# Patient Record
Sex: FEMALE | Race: WHITE | NOT HISPANIC OR LATINO | Employment: OTHER | URBAN - METROPOLITAN AREA
[De-identification: names, ages, dates, MRNs, and addresses within clinical notes are randomized per-mention and may not be internally consistent; named-entity substitution may affect disease eponyms.]

---

## 2023-08-18 ENCOUNTER — HOSPITAL ENCOUNTER (OUTPATIENT)
Dept: DATA CONVERSION | Facility: HOSPITAL | Age: 78
Discharge: HOME | End: 2023-08-18
Payer: COMMERCIAL

## 2023-08-18 DIAGNOSIS — Z00.00 ENCOUNTER FOR GENERAL ADULT MEDICAL EXAMINATION WITHOUT ABNORMAL FINDINGS: ICD-10-CM

## 2023-08-18 LAB
ANION GAP SERPL CALCULATED.3IONS-SCNC: 10 MMOL/L (ref 0–19)
BUN SERPL-MCNC: 13 MG/DL (ref 8–25)
BUN/CREAT SERPL: 11.8 RATIO (ref 8–21)
CALCIUM SERPL-MCNC: 9.5 MG/DL (ref 8.5–10.4)
CHLORIDE SERPL-SCNC: 109 MMOL/L (ref 97–107)
CO2 SERPL-SCNC: 21 MMOL/L (ref 24–31)
CREAT SERPL-MCNC: 1.1 MG/DL (ref 0.4–1.6)
DEPRECATED RDW RBC AUTO: 45.9 FL (ref 37–54)
ERYTHROCYTE [DISTWIDTH] IN BLOOD BY AUTOMATED COUNT: 12.9 % (ref 11.7–15)
GFR SERPL CREATININE-BSD FRML MDRD: 51 ML/MIN/1.73 M2
GLUCOSE SERPL-MCNC: 145 MG/DL (ref 65–99)
HCT VFR BLD AUTO: 42.6 % (ref 36–44)
HGB BLD-MCNC: 14.2 GM/DL (ref 12–15)
MCH RBC QN AUTO: 32.6 PG (ref 26–34)
MCHC RBC AUTO-ENTMCNC: 33.3 % (ref 31–37)
MCV RBC AUTO: 97.9 FL (ref 80–100)
NRBC BLD-RTO: 0 /100 WBC
PLATELET # BLD AUTO: 248 K/UL (ref 150–450)
PMV BLD AUTO: 9.4 CU (ref 7–12.6)
POTASSIUM SERPL-SCNC: 4.1 MMOL/L (ref 3.4–5.1)
RBC # BLD AUTO: 4.35 M/UL (ref 4–4.9)
SODIUM SERPL-SCNC: 140 MMOL/L (ref 133–145)
WBC # BLD AUTO: 8.9 K/UL (ref 4.5–11)

## 2023-10-02 ENCOUNTER — LAB REQUISITION (OUTPATIENT)
Dept: LAB | Facility: HOSPITAL | Age: 78
End: 2023-10-02
Payer: MEDICARE

## 2023-10-02 LAB — TSH SERPL DL<=0.05 MIU/L-ACNC: 4.52 MIU/L (ref 0.27–4.2)

## 2023-10-02 PROCEDURE — 84443 ASSAY THYROID STIM HORMONE: CPT | Mod: OUT,TRILAB | Performed by: INTERNAL MEDICINE

## 2023-10-02 PROCEDURE — 80061 LIPID PANEL: CPT | Mod: OUT,CMCLAB,TRILAB | Performed by: INTERNAL MEDICINE

## 2023-10-02 PROCEDURE — 80061 LIPID PANEL: CPT | Mod: OUT,TRILAB | Performed by: INTERNAL MEDICINE

## 2023-10-04 LAB
CHOLEST SERPL-MCNC: 138 MG/DL (ref 0–199)
CHOLESTEROL/HDL RATIO: 2.6
HDLC SERPL-MCNC: 52.8 MG/DL
LDLC SERPL CALC-MCNC: 66 MG/DL (ref 140–190)
NON HDL CHOLESTEROL: 85 MG/DL (ref 0–149)
TRIGL SERPL-MCNC: 96 MG/DL (ref 0–149)
VLDL: 19 MG/DL (ref 0–40)

## 2023-10-05 ENCOUNTER — NURSING HOME VISIT (OUTPATIENT)
Dept: POST ACUTE CARE | Facility: EXTERNAL LOCATION | Age: 78
End: 2023-10-05
Payer: MEDICARE

## 2023-10-05 VITALS
TEMPERATURE: 97 F | OXYGEN SATURATION: 94 % | BODY MASS INDEX: 34.53 KG/M2 | SYSTOLIC BLOOD PRESSURE: 111 MMHG | RESPIRATION RATE: 18 BRPM | HEART RATE: 70 BPM | DIASTOLIC BLOOD PRESSURE: 61 MMHG | WEIGHT: 207.5 LBS

## 2023-10-05 DIAGNOSIS — I50.9 CHRONIC CONGESTIVE HEART FAILURE, UNSPECIFIED HEART FAILURE TYPE (MULTI): ICD-10-CM

## 2023-10-05 DIAGNOSIS — J18.9 PNEUMONIA OF RIGHT LOWER LOBE DUE TO INFECTIOUS ORGANISM: Primary | ICD-10-CM

## 2023-10-05 DIAGNOSIS — E03.9 ACQUIRED HYPOTHYROIDISM: ICD-10-CM

## 2023-10-05 DIAGNOSIS — F31.32 BIPOLAR AFFECTIVE DISORDER, CURRENTLY DEPRESSED, MODERATE (MULTI): ICD-10-CM

## 2023-10-05 PROCEDURE — 99308 SBSQ NF CARE LOW MDM 20: CPT | Performed by: PHYSICIAN ASSISTANT

## 2023-10-05 ASSESSMENT — ENCOUNTER SYMPTOMS
FLANK PAIN: 0
WHEEZING: 0
HEMATURIA: 0
VOMITING: 0
TREMORS: 0
SHORTNESS OF BREATH: 0
COUGH: 1
NAUSEA: 0
DIARRHEA: 0
HEADACHES: 0
FEVER: 0
DYSURIA: 0
CHILLS: 0
APPETITE CHANGE: 0
CONFUSION: 0
FREQUENCY: 0
DIZZINESS: 0
ABDOMINAL PAIN: 0
WEAKNESS: 0
CONSTIPATION: 0
NERVOUS/ANXIOUS: 0

## 2023-10-05 NOTE — ASSESSMENT & PLAN NOTE
Chest x-ray showed right Lower lobe infiltrate.   Treat with course of levaquin 500mg daily x 7 days.

## 2023-10-05 NOTE — PROGRESS NOTES
No chief complaint on file.      Subjective   [id number] : Mariella Cuello is a 78 y.o. female LTC resident at [facility].   HPI  Asked to see pt this morning by nursing due to reports of cough and chest congestion.  Pt seen while resting in bed.  Pt is a poor historian.   She states that she feels well and offers no new complaints.   She does not appear to be short of breath.  She has no lower leg edema noted.  She does take lasix daily.  No fevers, chills or hypoxia reported.   Pt had routine labs this week which have been reviewed.      Review of Systems   Constitutional:  Negative for appetite change, chills and fever.   Respiratory:  Positive for cough. Negative for shortness of breath and wheezing.    Cardiovascular:  Negative for chest pain and leg swelling.   Gastrointestinal:  Negative for abdominal pain, constipation, diarrhea, nausea and vomiting.   Genitourinary:  Negative for dysuria, flank pain, frequency and hematuria.   Neurological:  Negative for dizziness, tremors, weakness and headaches.   Psychiatric/Behavioral:  Negative for confusion. The patient is not nervous/anxious.    All other systems reviewed and are negative.      Objective   /61   Pulse 70   Temp 36.1 °C (97 °F)   Resp 18   Wt 94.1 kg (207 lb 8 oz)   SpO2 94%   BMI 34.53 kg/m²    Physical Exam  Constitutional:       General: She is not in acute distress.  Eyes:      Conjunctiva/sclera: Conjunctivae normal.      Pupils: Pupils are equal, round, and reactive to light.   Cardiovascular:      Rate and Rhythm: Normal rate and regular rhythm.      Heart sounds: No murmur heard.  Pulmonary:      Effort: Pulmonary effort is normal.      Breath sounds: No wheezing, rhonchi or rales.      Comments: Crackles in the right base.  Abdominal:      General: Abdomen is flat. Bowel sounds are normal. There is no distension.      Palpations: Abdomen is soft. There is no mass.      Tenderness: There is no abdominal tenderness.  "  Musculoskeletal:         General: No swelling. Normal range of motion.   Skin:     General: Skin is warm and dry.      Findings: No rash.   Neurological:      General: No focal deficit present.      Mental Status: She is alert and oriented to person, place, and time. Mental status is at baseline.       No lab exists for component: \"CBC BMP\"  Assessment/Plan   Problem List Items Addressed This Visit             ICD-10-CM    Pneumonia of right lower lobe due to infectious organism - Primary J18.9     Chest x-ray showed right Lower lobe infiltrate.   Treat with course of levaquin 500mg daily x 7 days.          Chronic congestive heart failure (CMS/HCC) I50.9     Continue lasix.  Stable.          Acquired hypothyroidism E03.9     Elevated TSH on routine labs.  Synthroid increased to 125mcg daily.  Recheck labs in 3 mo.         Bipolar affective disorder, currently depressed, moderate (CMS/HCC) F31.32     Continue current meds.  Pt follows with psych                "

## 2023-10-05 NOTE — LETTER
Patient: Mariella Cuello  : 1945    Encounter Date: 10/05/2023    No chief complaint on file.      Subjective  [id number] : Mariella Cuello is a 78 y.o. female LTC resident at [facility].   HPI  Asked to see pt this morning by nursing due to reports of cough and chest congestion.  Pt seen while resting in bed.  Pt is a poor historian.   She states that she feels well and offers no new complaints.   She does not appear to be short of breath.  She has no lower leg edema noted.  She does take lasix daily.  No fevers, chills or hypoxia reported.   Pt had routine labs this week which have been reviewed.      Review of Systems   Constitutional:  Negative for appetite change, chills and fever.   Respiratory:  Positive for cough. Negative for shortness of breath and wheezing.    Cardiovascular:  Negative for chest pain and leg swelling.   Gastrointestinal:  Negative for abdominal pain, constipation, diarrhea, nausea and vomiting.   Genitourinary:  Negative for dysuria, flank pain, frequency and hematuria.   Neurological:  Negative for dizziness, tremors, weakness and headaches.   Psychiatric/Behavioral:  Negative for confusion. The patient is not nervous/anxious.    All other systems reviewed and are negative.      Objective  /61   Pulse 70   Temp 36.1 °C (97 °F)   Resp 18   Wt 94.1 kg (207 lb 8 oz)   SpO2 94%   BMI 34.53 kg/m²    Physical Exam  Constitutional:       General: She is not in acute distress.  Eyes:      Conjunctiva/sclera: Conjunctivae normal.      Pupils: Pupils are equal, round, and reactive to light.   Cardiovascular:      Rate and Rhythm: Normal rate and regular rhythm.      Heart sounds: No murmur heard.  Pulmonary:      Effort: Pulmonary effort is normal.      Breath sounds: No wheezing, rhonchi or rales.      Comments: Crackles in the right base.  Abdominal:      General: Abdomen is flat. Bowel sounds are normal. There is no distension.      Palpations: Abdomen is soft. There is no  "mass.      Tenderness: There is no abdominal tenderness.   Musculoskeletal:         General: No swelling. Normal range of motion.   Skin:     General: Skin is warm and dry.      Findings: No rash.   Neurological:      General: No focal deficit present.      Mental Status: She is alert and oriented to person, place, and time. Mental status is at baseline.       No lab exists for component: \"CBC BMP\"  Assessment/Plan  Problem List Items Addressed This Visit             ICD-10-CM    Pneumonia of right lower lobe due to infectious organism - Primary J18.9     Chest x-ray showed right Lower lobe infiltrate.   Treat with course of levaquin 500mg daily x 7 days.          Chronic congestive heart failure (CMS/HCC) I50.9     Continue lasix.  Stable.          Acquired hypothyroidism E03.9     Elevated TSH on routine labs.  Synthroid increased to 125mcg daily.  Recheck labs in 3 mo.         Bipolar affective disorder, currently depressed, moderate (CMS/HCC) F31.32     Continue current meds.  Pt follows with psych                    Electronically Signed By: Ana Paula Paul PA-C   10/5/23  2:19 PM  "

## 2023-10-19 ENCOUNTER — NURSING HOME VISIT (OUTPATIENT)
Dept: PRIMARY CARE | Facility: CLINIC | Age: 78
End: 2023-10-19
Payer: MEDICARE

## 2023-10-19 DIAGNOSIS — E03.9 ACQUIRED HYPOTHYROIDISM: ICD-10-CM

## 2023-10-19 DIAGNOSIS — N18.31 STAGE 3A CHRONIC KIDNEY DISEASE (MULTI): ICD-10-CM

## 2023-10-19 DIAGNOSIS — I50.32 CHRONIC DIASTOLIC CONGESTIVE HEART FAILURE (MULTI): Primary | ICD-10-CM

## 2023-10-19 DIAGNOSIS — F31.32 BIPOLAR AFFECTIVE DISORDER, CURRENTLY DEPRESSED, MODERATE (MULTI): ICD-10-CM

## 2023-10-19 PROCEDURE — 99308 SBSQ NF CARE LOW MDM 20: CPT | Performed by: INTERNAL MEDICINE

## 2023-10-19 NOTE — PROGRESS NOTES
PROGRESS NOTE      Mariella Cuello is a 78 y.o. female seen in follow up at [facility].    ASSESSMENT / PLAN:  Diagnoses and all orders for this visit:  Chronic diastolic congestive heart failure (CMS/HCC)  Acquired hypothyroidism  Bipolar affective disorder, currently depressed, moderate (CMS/HCC)  Stage 3a chronic kidney disease (CMS/HCC)    Patient Active Problem List   Diagnosis    Pneumonia of right lower lobe due to infectious organism    Chronic congestive heart failure (CMS/HCC)    Acquired hypothyroidism    Bipolar affective disorder, currently depressed, moderate (CMS/HCC)     Continue the currently prescribed medications as outlined in [facility].  TSH 4.52 hard to tell if clinically euthryroid - would recheck in 1-2 month and if climbing make med adjustment.    Subjective   This is a 78 y.o. female who resides at at long term care facility, seen today for routine follow up.  The nursing facility documentation is reviewed including orders.  There are currently no concerns over adverse reactions to the prescribed medications.  I reviewed any pertinent laboratory studies.  I discussed the patient's condition with the staff, including current functional status.        Review of Systems   Objective   Vital signs reviewed in [facility].  Physical Exam  Vitals reviewed.   Constitutional:       Appearance: Normal appearance.   Cardiovascular:      Rate and Rhythm: Normal rate and regular rhythm.      Heart sounds: No murmur heard.  Pulmonary:      Breath sounds: Normal breath sounds. No wheezing, rhonchi or rales.   Musculoskeletal:      Right lower leg: No edema.      Left lower leg: No edema.         Medical History as seen below has been reviewed and updated in the chart.  Past Medical History:   Diagnosis Date    Atherosclerotic heart disease of native coronary artery without angina pectoris 05/12/2021    Atherosclerosis of coronary artery without angina pectoris, unspecified vessel or lesion  type, unspecified whether native or transplanted heart    Bipolar disorder, currently in remission, most recent episode unspecified (CMS/[facility]) 05/12/2021    History of depressed bipolar disorder    Chronic systolic (congestive) heart failure (CMS/[facility]) 05/12/2021    Chronic systolic heart failure, ACC/AHA stage C    Major depressive disorder, recurrent, unspecified (CMS/[facility]) 05/12/2021    Major depression, recurrent    Personal history of diseases of the blood and blood-forming organs and certain disorders involving the immune mechanism 05/12/2021    History of anemia    Personal history of other diseases of the digestive system 05/12/2021    History of constipation    Personal history of other endocrine, nutritional and metabolic disease 05/12/2021    History of hyperlipidemia    Personal history of other endocrine, nutritional and metabolic disease 05/12/2021    History of hypothyroidism    Personal history of other specified conditions 05/12/2021    History of insomnia     Past Surgical History:   Procedure Laterality Date    CATARACT EXTRACTION  02/29/2016    Cataract Surgery    MR HEAD ANGIO WO IV CONTRAST  1/22/2014    MR HEAD ANGIO WO IV CONTRAST [facility]    TONSILLECTOMY  02/29/2016    Tonsillectomy    TOTAL ABDOMINAL HYSTERECTOMY  02/29/2016    Total Abdominal Hysterectomy     No family history on file.  Not on File  No current outpatient medications on file prior to visit.     No current facility-administered medications on file prior to visit.     Immunization History   Administered Date(s) Administered    Pfizer Purple Cap SARS-CoV-2 10/29/2021     Alirio Augustine MD

## 2023-11-28 ENCOUNTER — TELEPHONE (OUTPATIENT)
Dept: PRIMARY CARE | Facility: CLINIC | Age: 78
End: 2023-11-28
Payer: COMMERCIAL

## 2023-11-28 DIAGNOSIS — G89.29 OTHER CHRONIC PAIN: Primary | ICD-10-CM

## 2023-11-28 RX ORDER — TRAMADOL HYDROCHLORIDE 50 MG/1
TABLET ORAL
COMMUNITY
Start: 2023-03-22 | End: 2023-11-28 | Stop reason: SDUPTHER

## 2023-11-28 NOTE — TELEPHONE ENCOUNTER
Refill - [facility]     Tramadol 50 mg  1 tablet by mouth every 12 hours as needed for pain    30 days  no refills

## 2023-11-29 RX ORDER — TRAMADOL HYDROCHLORIDE 50 MG/1
50 TABLET ORAL 2 TIMES DAILY
Qty: 60 TABLET | Refills: 0 | Status: SHIPPED | OUTPATIENT
Start: 2023-11-29 | End: 2023-12-14 | Stop reason: SDUPTHER

## 2023-12-14 ENCOUNTER — TELEPHONE (OUTPATIENT)
Dept: PRIMARY CARE | Facility: CLINIC | Age: 78
End: 2023-12-14
Payer: COMMERCIAL

## 2023-12-14 DIAGNOSIS — G89.29 OTHER CHRONIC PAIN: ICD-10-CM

## 2023-12-14 RX ORDER — TRAMADOL HYDROCHLORIDE 50 MG/1
50 TABLET ORAL 2 TIMES DAILY PRN
Qty: 60 TABLET | Refills: 2 | Status: SHIPPED | OUTPATIENT
Start: 2023-12-14

## 2023-12-14 RX ORDER — TRAMADOL HYDROCHLORIDE 50 MG/1
50 TABLET ORAL EVERY 8 HOURS PRN
Qty: 10 TABLET | Refills: 0 | OUTPATIENT
Start: 2023-12-14 | End: 2023-12-19

## 2023-12-14 NOTE — TELEPHONE ENCOUNTER
[facility] - 2 requests     [facility]     Tramadol Tab 50 mg  1 tablet by mouth every eight hours  as needed for moderate pain (4-6)    Tramadol 50 mg  1 tablet by mouth two times a day

## 2023-12-18 ENCOUNTER — APPOINTMENT (OUTPATIENT)
Dept: RADIOLOGY | Facility: HOSPITAL | Age: 78
DRG: 689 | End: 2023-12-18
Payer: MEDICARE

## 2023-12-18 ENCOUNTER — HOSPITAL ENCOUNTER (OUTPATIENT)
Dept: CARDIOLOGY | Facility: HOSPITAL | Age: 78
Discharge: HOME | End: 2023-12-18
Payer: MEDICARE

## 2023-12-18 ENCOUNTER — HOSPITAL ENCOUNTER (INPATIENT)
Facility: HOSPITAL | Age: 78
LOS: 2 days | Discharge: NURSING, REHAB OR LONG-TERM CARE | DRG: 689 | End: 2023-12-21
Attending: STUDENT IN AN ORGANIZED HEALTH CARE EDUCATION/TRAINING PROGRAM | Admitting: EMERGENCY MEDICINE
Payer: MEDICARE

## 2023-12-18 DIAGNOSIS — N30.00 ACUTE CYSTITIS WITHOUT HEMATURIA: ICD-10-CM

## 2023-12-18 DIAGNOSIS — R41.82 ALTERED MENTAL STATUS, UNSPECIFIED ALTERED MENTAL STATUS TYPE: Primary | ICD-10-CM

## 2023-12-18 DIAGNOSIS — J40 BRONCHITIS: ICD-10-CM

## 2023-12-18 LAB
ALBUMIN SERPL-MCNC: 3.5 G/DL (ref 3.5–5)
ALP BLD-CCNC: 99 U/L (ref 35–125)
ALT SERPL-CCNC: 18 U/L (ref 5–40)
ANION GAP SERPL CALC-SCNC: 10 MMOL/L
APPEARANCE UR: CLEAR
APTT PPP: 28.9 SECONDS (ref 22–32.5)
AST SERPL-CCNC: 30 U/L (ref 5–40)
BASOPHILS # BLD AUTO: 0.04 X10*3/UL (ref 0–0.1)
BASOPHILS NFR BLD AUTO: 0.3 %
BILIRUB SERPL-MCNC: 0.6 MG/DL (ref 0.1–1.2)
BILIRUB UR STRIP.AUTO-MCNC: NEGATIVE MG/DL
BUN SERPL-MCNC: 12 MG/DL (ref 8–25)
CALCIUM SERPL-MCNC: 9.8 MG/DL (ref 8.5–10.4)
CHLORIDE SERPL-SCNC: 105 MMOL/L (ref 97–107)
CO2 SERPL-SCNC: 21 MMOL/L (ref 24–31)
COLOR UR: YELLOW
CREAT SERPL-MCNC: 1 MG/DL (ref 0.4–1.6)
EOSINOPHIL # BLD AUTO: 0.09 X10*3/UL (ref 0–0.4)
EOSINOPHIL NFR BLD AUTO: 0.6 %
ERYTHROCYTE [DISTWIDTH] IN BLOOD BY AUTOMATED COUNT: 12.2 % (ref 11.5–14.5)
GFR SERPL CREATININE-BSD FRML MDRD: 57 ML/MIN/1.73M*2
GLUCOSE BLD MANUAL STRIP-MCNC: 111 MG/DL (ref 74–99)
GLUCOSE SERPL-MCNC: 109 MG/DL (ref 65–99)
GLUCOSE UR STRIP.AUTO-MCNC: NORMAL MG/DL
HCT VFR BLD AUTO: 41.7 % (ref 36–46)
HGB BLD-MCNC: 14.1 G/DL (ref 12–16)
IMM GRANULOCYTES # BLD AUTO: 0.08 X10*3/UL (ref 0–0.5)
IMM GRANULOCYTES NFR BLD AUTO: 0.5 % (ref 0–0.9)
INR PPP: 1.1 (ref 0.9–1.2)
KETONES UR STRIP.AUTO-MCNC: NEGATIVE MG/DL
LEUKOCYTE ESTERASE UR QL STRIP.AUTO: ABNORMAL
LYMPHOCYTES # BLD AUTO: 3.06 X10*3/UL (ref 0.8–3)
LYMPHOCYTES NFR BLD AUTO: 19.8 %
MCH RBC QN AUTO: 33.3 PG (ref 26–34)
MCHC RBC AUTO-ENTMCNC: 33.8 G/DL (ref 32–36)
MCV RBC AUTO: 98 FL (ref 80–100)
MONOCYTES # BLD AUTO: 1.25 X10*3/UL (ref 0.05–0.8)
MONOCYTES NFR BLD AUTO: 8.1 %
MUCOUS THREADS #/AREA URNS AUTO: NORMAL /LPF
NEUTROPHILS # BLD AUTO: 10.93 X10*3/UL (ref 1.6–5.5)
NEUTROPHILS NFR BLD AUTO: 70.7 %
NITRITE UR QL STRIP.AUTO: ABNORMAL
NRBC BLD-RTO: 0 /100 WBCS (ref 0–0)
PH UR STRIP.AUTO: 6.5 [PH]
PLATELET # BLD AUTO: 242 X10*3/UL (ref 150–450)
POTASSIUM SERPL-SCNC: 4.1 MMOL/L (ref 3.4–5.1)
PROT SERPL-MCNC: 7.6 G/DL (ref 5.9–7.9)
PROT UR STRIP.AUTO-MCNC: NEGATIVE MG/DL
PROTHROMBIN TIME: 11.4 SECONDS (ref 9.3–12.7)
RBC # BLD AUTO: 4.24 X10*6/UL (ref 4–5.2)
RBC # UR STRIP.AUTO: NEGATIVE /UL
RBC #/AREA URNS AUTO: NORMAL /HPF
SODIUM SERPL-SCNC: 136 MMOL/L (ref 133–145)
SP GR UR STRIP.AUTO: 1.02
SQUAMOUS #/AREA URNS AUTO: NORMAL /HPF
TROPONIN T SERPL-MCNC: 11 NG/L
UROBILINOGEN UR STRIP.AUTO-MCNC: ABNORMAL MG/DL
WBC # BLD AUTO: 15.5 X10*3/UL (ref 4.4–11.3)
WBC #/AREA URNS AUTO: NORMAL /HPF

## 2023-12-18 PROCEDURE — 85610 PROTHROMBIN TIME: CPT | Performed by: STUDENT IN AN ORGANIZED HEALTH CARE EDUCATION/TRAINING PROGRAM

## 2023-12-18 PROCEDURE — 80053 COMPREHEN METABOLIC PANEL: CPT | Performed by: STUDENT IN AN ORGANIZED HEALTH CARE EDUCATION/TRAINING PROGRAM

## 2023-12-18 PROCEDURE — 93005 ELECTROCARDIOGRAM TRACING: CPT

## 2023-12-18 PROCEDURE — 85730 THROMBOPLASTIN TIME PARTIAL: CPT | Performed by: STUDENT IN AN ORGANIZED HEALTH CARE EDUCATION/TRAINING PROGRAM

## 2023-12-18 PROCEDURE — 99285 EMERGENCY DEPT VISIT HI MDM: CPT | Performed by: STUDENT IN AN ORGANIZED HEALTH CARE EDUCATION/TRAINING PROGRAM

## 2023-12-18 PROCEDURE — 71045 X-RAY EXAM CHEST 1 VIEW: CPT

## 2023-12-18 PROCEDURE — 70450 CT HEAD/BRAIN W/O DYE: CPT

## 2023-12-18 PROCEDURE — 93010 ELECTROCARDIOGRAM REPORT: CPT | Performed by: INTERNAL MEDICINE

## 2023-12-18 PROCEDURE — 96360 HYDRATION IV INFUSION INIT: CPT

## 2023-12-18 PROCEDURE — 84484 ASSAY OF TROPONIN QUANT: CPT | Performed by: STUDENT IN AN ORGANIZED HEALTH CARE EDUCATION/TRAINING PROGRAM

## 2023-12-18 PROCEDURE — 87637 SARSCOV2&INF A&B&RSV AMP PRB: CPT | Performed by: STUDENT IN AN ORGANIZED HEALTH CARE EDUCATION/TRAINING PROGRAM

## 2023-12-18 PROCEDURE — 81001 URINALYSIS AUTO W/SCOPE: CPT | Performed by: STUDENT IN AN ORGANIZED HEALTH CARE EDUCATION/TRAINING PROGRAM

## 2023-12-18 PROCEDURE — 2500000004 HC RX 250 GENERAL PHARMACY W/ HCPCS (ALT 636 FOR OP/ED): Performed by: STUDENT IN AN ORGANIZED HEALTH CARE EDUCATION/TRAINING PROGRAM

## 2023-12-18 PROCEDURE — 87186 SC STD MICRODIL/AGAR DIL: CPT | Mod: TRILAB | Performed by: STUDENT IN AN ORGANIZED HEALTH CARE EDUCATION/TRAINING PROGRAM

## 2023-12-18 PROCEDURE — 82947 ASSAY GLUCOSE BLOOD QUANT: CPT

## 2023-12-18 PROCEDURE — 36415 COLL VENOUS BLD VENIPUNCTURE: CPT | Performed by: STUDENT IN AN ORGANIZED HEALTH CARE EDUCATION/TRAINING PROGRAM

## 2023-12-18 PROCEDURE — 85025 COMPLETE CBC W/AUTO DIFF WBC: CPT | Performed by: STUDENT IN AN ORGANIZED HEALTH CARE EDUCATION/TRAINING PROGRAM

## 2023-12-18 RX ORDER — LEVOTHYROXINE SODIUM 100 UG/1
112 TABLET ORAL DAILY
COMMUNITY

## 2023-12-18 RX ORDER — VENLAFAXINE HYDROCHLORIDE 75 MG/1
225 CAPSULE, EXTENDED RELEASE ORAL DAILY
COMMUNITY
Start: 2021-03-02

## 2023-12-18 RX ORDER — POTASSIUM CHLORIDE 20 MEQ/1
20 TABLET, EXTENDED RELEASE ORAL DAILY
COMMUNITY
Start: 2023-10-31

## 2023-12-18 RX ORDER — DOCUSATE SODIUM 100 MG/1
100 CAPSULE, LIQUID FILLED ORAL 2 TIMES DAILY
COMMUNITY

## 2023-12-18 RX ORDER — BUSPIRONE HYDROCHLORIDE 5 MG/1
5 TABLET ORAL 3 TIMES DAILY
COMMUNITY
Start: 2023-10-31

## 2023-12-18 RX ORDER — ARIPIPRAZOLE 2 MG/1
2 TABLET ORAL DAILY
COMMUNITY
Start: 2023-10-31

## 2023-12-18 RX ORDER — BISACODYL 10 MG/1
10 SUPPOSITORY RECTAL DAILY PRN
COMMUNITY

## 2023-12-18 RX ORDER — ASPIRIN 81 MG/1
81 TABLET ORAL DAILY
COMMUNITY

## 2023-12-18 RX ORDER — DICLOFENAC EPOLAMINE 0.01 G/1
1 SYSTEM TOPICAL EVERY 12 HOURS
COMMUNITY
Start: 2023-12-03

## 2023-12-18 RX ORDER — GUAIFENESIN 100 MG/5ML
200 SOLUTION ORAL EVERY 4 HOURS PRN
COMMUNITY
Start: 2022-01-10

## 2023-12-18 RX ORDER — TRAZODONE HYDROCHLORIDE 50 MG/1
25 TABLET ORAL DAILY
COMMUNITY
Start: 2021-03-06

## 2023-12-18 RX ORDER — ACETAMINOPHEN 325 MG/1
650 TABLET ORAL EVERY 6 HOURS PRN
COMMUNITY
Start: 2018-08-06

## 2023-12-18 RX ORDER — ALUMINUM HYDROXIDE, MAGNESIUM HYDROXIDE, AND SIMETHICONE 2400; 240; 2400 MG/30ML; MG/30ML; MG/30ML
30 SUSPENSION ORAL EVERY 4 HOURS PRN
COMMUNITY

## 2023-12-18 RX ORDER — TOPIRAMATE 50 MG/1
50 TABLET, FILM COATED ORAL 2 TIMES DAILY
COMMUNITY

## 2023-12-18 RX ORDER — ACETAMINOPHEN 650 MG/1
650 SUPPOSITORY RECTAL EVERY 6 HOURS PRN
COMMUNITY

## 2023-12-18 RX ORDER — LAMOTRIGINE 100 MG/1
100 TABLET ORAL 2 TIMES DAILY
COMMUNITY
Start: 2021-03-02

## 2023-12-18 RX ORDER — MAGNESIUM HYDROXIDE 2400 MG/10ML
10 SUSPENSION ORAL DAILY PRN
COMMUNITY

## 2023-12-18 RX ORDER — SENNOSIDES 8.6 MG/1
2 TABLET ORAL DAILY
COMMUNITY

## 2023-12-18 RX ORDER — ATORVASTATIN CALCIUM 20 MG/1
20 TABLET, FILM COATED ORAL DAILY
COMMUNITY
Start: 2021-03-02

## 2023-12-18 RX ORDER — BISOPROLOL FUMARATE 5 MG/1
2.5 TABLET, FILM COATED ORAL 2 TIMES DAILY
COMMUNITY
Start: 2023-10-31

## 2023-12-18 RX ORDER — MELATONIN 3 MG
6 CAPSULE ORAL DAILY
COMMUNITY

## 2023-12-18 RX ORDER — FUROSEMIDE 20 MG/1
20 TABLET ORAL DAILY
COMMUNITY
Start: 2021-10-26

## 2023-12-18 RX ORDER — ONDANSETRON 4 MG/1
4 TABLET, FILM COATED ORAL EVERY 8 HOURS PRN
COMMUNITY

## 2023-12-18 RX ORDER — ARIPIPRAZOLE 5 MG/1
5 TABLET ORAL DAILY
COMMUNITY
Start: 2021-03-02

## 2023-12-18 RX ORDER — ACETAMINOPHEN 500 MG
5000 TABLET ORAL DAILY
COMMUNITY
Start: 2021-03-02

## 2023-12-18 RX ORDER — DICLOFENAC SODIUM 10 MG/G
4 GEL TOPICAL EVERY 8 HOURS PRN
COMMUNITY

## 2023-12-18 RX ORDER — LOPERAMIDE HCL 2 MG
2 TABLET ORAL
COMMUNITY

## 2023-12-18 RX ADMIN — SODIUM CHLORIDE 500 ML: 9 INJECTION, SOLUTION INTRAVENOUS at 23:25

## 2023-12-18 ASSESSMENT — COLUMBIA-SUICIDE SEVERITY RATING SCALE - C-SSRS
1. IN THE PAST MONTH, HAVE YOU WISHED YOU WERE DEAD OR WISHED YOU COULD GO TO SLEEP AND NOT WAKE UP?: NO
6. HAVE YOU EVER DONE ANYTHING, STARTED TO DO ANYTHING, OR PREPARED TO DO ANYTHING TO END YOUR LIFE?: NO
2. HAVE YOU ACTUALLY HAD ANY THOUGHTS OF KILLING YOURSELF?: NO

## 2023-12-18 ASSESSMENT — PAIN - FUNCTIONAL ASSESSMENT: PAIN_FUNCTIONAL_ASSESSMENT: 0-10

## 2023-12-18 ASSESSMENT — PAIN SCALES - GENERAL
PAINLEVEL_OUTOF10: 0 - NO PAIN
PAINLEVEL_OUTOF10: 0 - NO PAIN

## 2023-12-19 PROBLEM — R41.82 ALTERED MENTAL STATUS, UNSPECIFIED ALTERED MENTAL STATUS TYPE: Status: ACTIVE | Noted: 2023-12-19

## 2023-12-19 PROBLEM — G30.9 ALZHEIMER'S DEMENTIA (MULTI): Status: ACTIVE | Noted: 2023-12-19

## 2023-12-19 PROBLEM — F31.9 BIPOLAR DISORDER (MULTI): Status: ACTIVE | Noted: 2023-12-19

## 2023-12-19 PROBLEM — F31.32 BIPOLAR AFFECTIVE DISORDER, CURRENTLY DEPRESSED, MODERATE (MULTI): Status: RESOLVED | Noted: 2023-10-05 | Resolved: 2023-12-19

## 2023-12-19 PROBLEM — J18.9 PNEUMONIA OF RIGHT LOWER LOBE DUE TO INFECTIOUS ORGANISM: Status: RESOLVED | Noted: 2023-10-05 | Resolved: 2023-12-19

## 2023-12-19 PROBLEM — F02.80 ALZHEIMER'S DEMENTIA (MULTI): Status: ACTIVE | Noted: 2023-12-19

## 2023-12-19 PROBLEM — F25.9 SCHIZOAFFECTIVE DISORDER (MULTI): Status: ACTIVE | Noted: 2023-12-19

## 2023-12-19 LAB
ALBUMIN SERPL-MCNC: 2.8 G/DL (ref 3.5–5)
ALP BLD-CCNC: 89 U/L (ref 35–125)
ALT SERPL-CCNC: 15 U/L (ref 5–40)
ANION GAP SERPL CALC-SCNC: 9 MMOL/L
AST SERPL-CCNC: 26 U/L (ref 5–40)
ATRIAL RATE: 86 BPM
BILIRUB SERPL-MCNC: 0.5 MG/DL (ref 0.1–1.2)
BUN SERPL-MCNC: 11 MG/DL (ref 8–25)
CALCIUM SERPL-MCNC: 9.1 MG/DL (ref 8.5–10.4)
CHLORIDE SERPL-SCNC: 109 MMOL/L (ref 97–107)
CO2 SERPL-SCNC: 18 MMOL/L (ref 24–31)
CREAT SERPL-MCNC: 0.8 MG/DL (ref 0.4–1.6)
FLUAV RNA RESP QL NAA+PROBE: NOT DETECTED
FLUBV RNA RESP QL NAA+PROBE: NOT DETECTED
GFR SERPL CREATININE-BSD FRML MDRD: 75 ML/MIN/1.73M*2
GLUCOSE BLD MANUAL STRIP-MCNC: 130 MG/DL (ref 74–99)
GLUCOSE SERPL-MCNC: 111 MG/DL (ref 65–99)
P AXIS: -9 DEGREES
P OFFSET: 190 MS
P ONSET: 138 MS
POTASSIUM SERPL-SCNC: 3.7 MMOL/L (ref 3.4–5.1)
PR INTERVAL: 146 MS
PROT SERPL-MCNC: 6.8 G/DL (ref 5.9–7.9)
Q ONSET: 211 MS
QRS COUNT: 14 BEATS
QRS DURATION: 132 MS
QT INTERVAL: 392 MS
QTC CALCULATION(BAZETT): 469 MS
QTC FREDERICIA: 442 MS
R AXIS: -52 DEGREES
RSV RNA RESP QL NAA+PROBE: NOT DETECTED
SARS-COV-2 RNA RESP QL NAA+PROBE: NOT DETECTED
SODIUM SERPL-SCNC: 136 MMOL/L (ref 133–145)
T AXIS: 96 DEGREES
T OFFSET: 407 MS
VENTRICULAR RATE: 86 BPM

## 2023-12-19 PROCEDURE — 2500000004 HC RX 250 GENERAL PHARMACY W/ HCPCS (ALT 636 FOR OP/ED): Performed by: NURSE PRACTITIONER

## 2023-12-19 PROCEDURE — 94640 AIRWAY INHALATION TREATMENT: CPT

## 2023-12-19 PROCEDURE — 80053 COMPREHEN METABOLIC PANEL: CPT | Performed by: EMERGENCY MEDICINE

## 2023-12-19 PROCEDURE — 92610 EVALUATE SWALLOWING FUNCTION: CPT | Mod: GN | Performed by: SPEECH-LANGUAGE PATHOLOGIST

## 2023-12-19 PROCEDURE — 99233 SBSQ HOSP IP/OBS HIGH 50: CPT | Performed by: NURSE PRACTITIONER

## 2023-12-19 PROCEDURE — 94664 DEMO&/EVAL PT USE INHALER: CPT

## 2023-12-19 PROCEDURE — 87205 SMEAR GRAM STAIN: CPT | Mod: TRILAB | Performed by: NURSE PRACTITIONER

## 2023-12-19 PROCEDURE — 2500000004 HC RX 250 GENERAL PHARMACY W/ HCPCS (ALT 636 FOR OP/ED): Performed by: EMERGENCY MEDICINE

## 2023-12-19 PROCEDURE — 2060000001 HC INTERMEDIATE ICU ROOM DAILY

## 2023-12-19 PROCEDURE — 36415 COLL VENOUS BLD VENIPUNCTURE: CPT | Performed by: EMERGENCY MEDICINE

## 2023-12-19 PROCEDURE — 96372 THER/PROPH/DIAG INJ SC/IM: CPT | Performed by: EMERGENCY MEDICINE

## 2023-12-19 PROCEDURE — 9420000001 HC RT PATIENT EDUCATION 5 MIN

## 2023-12-19 PROCEDURE — 82947 ASSAY GLUCOSE BLOOD QUANT: CPT

## 2023-12-19 PROCEDURE — 2500000001 HC RX 250 WO HCPCS SELF ADMINISTERED DRUGS (ALT 637 FOR MEDICARE OP): Performed by: EMERGENCY MEDICINE

## 2023-12-19 PROCEDURE — 2500000002 HC RX 250 W HCPCS SELF ADMINISTERED DRUGS (ALT 637 FOR MEDICARE OP, ALT 636 FOR OP/ED): Performed by: EMERGENCY MEDICINE

## 2023-12-19 RX ORDER — SENNOSIDES 8.6 MG/1
2 TABLET ORAL DAILY
Status: DISCONTINUED | OUTPATIENT
Start: 2023-12-19 | End: 2023-12-21 | Stop reason: HOSPADM

## 2023-12-19 RX ORDER — LEVOTHYROXINE SODIUM 112 UG/1
112 TABLET ORAL DAILY
Status: DISCONTINUED | OUTPATIENT
Start: 2023-12-19 | End: 2023-12-21 | Stop reason: HOSPADM

## 2023-12-19 RX ORDER — ARIPIPRAZOLE 5 MG/1
5 TABLET ORAL DAILY
Status: DISCONTINUED | OUTPATIENT
Start: 2023-12-19 | End: 2023-12-21 | Stop reason: HOSPADM

## 2023-12-19 RX ORDER — ACETAMINOPHEN 650 MG/1
650 SUPPOSITORY RECTAL EVERY 6 HOURS PRN
Status: DISCONTINUED | OUTPATIENT
Start: 2023-12-19 | End: 2023-12-21 | Stop reason: HOSPADM

## 2023-12-19 RX ORDER — IPRATROPIUM BROMIDE AND ALBUTEROL SULFATE 2.5; .5 MG/3ML; MG/3ML
3 SOLUTION RESPIRATORY (INHALATION)
Status: DISCONTINUED | OUTPATIENT
Start: 2023-12-19 | End: 2023-12-21 | Stop reason: HOSPADM

## 2023-12-19 RX ORDER — DEXTROSE MONOHYDRATE AND SODIUM CHLORIDE 5; .9 G/100ML; G/100ML
50 INJECTION, SOLUTION INTRAVENOUS CONTINUOUS
Status: DISCONTINUED | OUTPATIENT
Start: 2023-12-19 | End: 2023-12-21 | Stop reason: HOSPADM

## 2023-12-19 RX ORDER — ATORVASTATIN CALCIUM 20 MG/1
20 TABLET, FILM COATED ORAL NIGHTLY
Status: DISCONTINUED | OUTPATIENT
Start: 2023-12-19 | End: 2023-12-21 | Stop reason: HOSPADM

## 2023-12-19 RX ORDER — TOPIRAMATE 25 MG/1
50 TABLET ORAL 2 TIMES DAILY
Status: DISCONTINUED | OUTPATIENT
Start: 2023-12-19 | End: 2023-12-21 | Stop reason: HOSPADM

## 2023-12-19 RX ORDER — BISACODYL 10 MG/1
10 SUPPOSITORY RECTAL DAILY PRN
Status: DISCONTINUED | OUTPATIENT
Start: 2023-12-19 | End: 2023-12-21 | Stop reason: HOSPADM

## 2023-12-19 RX ORDER — ONDANSETRON HYDROCHLORIDE 2 MG/ML
4 INJECTION, SOLUTION INTRAVENOUS EVERY 6 HOURS PRN
Status: DISCONTINUED | OUTPATIENT
Start: 2023-12-19 | End: 2023-12-21 | Stop reason: HOSPADM

## 2023-12-19 RX ORDER — BUSPIRONE HYDROCHLORIDE 5 MG/1
5 TABLET ORAL 3 TIMES DAILY
Status: DISCONTINUED | OUTPATIENT
Start: 2023-12-19 | End: 2023-12-21 | Stop reason: HOSPADM

## 2023-12-19 RX ORDER — CEFTRIAXONE 1 G/50ML
1 INJECTION, SOLUTION INTRAVENOUS EVERY 24 HOURS
Status: DISCONTINUED | OUTPATIENT
Start: 2023-12-19 | End: 2023-12-21 | Stop reason: HOSPADM

## 2023-12-19 RX ORDER — ACETAMINOPHEN 325 MG/1
650 TABLET ORAL EVERY 6 HOURS PRN
Status: DISCONTINUED | OUTPATIENT
Start: 2023-12-19 | End: 2023-12-21 | Stop reason: HOSPADM

## 2023-12-19 RX ORDER — TRAZODONE HYDROCHLORIDE 50 MG/1
25 TABLET ORAL DAILY
Status: DISCONTINUED | OUTPATIENT
Start: 2023-12-19 | End: 2023-12-21 | Stop reason: HOSPADM

## 2023-12-19 RX ORDER — ARIPIPRAZOLE 2 MG/1
2 TABLET ORAL DAILY
Status: DISCONTINUED | OUTPATIENT
Start: 2023-12-19 | End: 2023-12-21 | Stop reason: HOSPADM

## 2023-12-19 RX ORDER — IPRATROPIUM BROMIDE AND ALBUTEROL SULFATE 2.5; .5 MG/3ML; MG/3ML
3 SOLUTION RESPIRATORY (INHALATION)
Status: DISCONTINUED | OUTPATIENT
Start: 2023-12-19 | End: 2023-12-19

## 2023-12-19 RX ORDER — LAMOTRIGINE 100 MG/1
100 TABLET ORAL 2 TIMES DAILY
Status: DISCONTINUED | OUTPATIENT
Start: 2023-12-19 | End: 2023-12-21 | Stop reason: HOSPADM

## 2023-12-19 RX ORDER — IPRATROPIUM BROMIDE AND ALBUTEROL SULFATE 2.5; .5 MG/3ML; MG/3ML
3 SOLUTION RESPIRATORY (INHALATION) EVERY 2 HOUR PRN
Status: DISCONTINUED | OUTPATIENT
Start: 2023-12-19 | End: 2023-12-21 | Stop reason: HOSPADM

## 2023-12-19 RX ORDER — POTASSIUM CHLORIDE 20 MEQ/1
20 TABLET, EXTENDED RELEASE ORAL
Status: DISCONTINUED | OUTPATIENT
Start: 2023-12-19 | End: 2023-12-21 | Stop reason: HOSPADM

## 2023-12-19 RX ORDER — LORAZEPAM 2 MG/ML
1 INJECTION INTRAMUSCULAR EVERY 5 MIN PRN
Status: DISCONTINUED | OUTPATIENT
Start: 2023-12-19 | End: 2023-12-21 | Stop reason: HOSPADM

## 2023-12-19 RX ORDER — ENOXAPARIN SODIUM 100 MG/ML
40 INJECTION SUBCUTANEOUS DAILY
Status: DISCONTINUED | OUTPATIENT
Start: 2023-12-19 | End: 2023-12-21 | Stop reason: HOSPADM

## 2023-12-19 RX ORDER — ASPIRIN 81 MG/1
81 TABLET ORAL DAILY
Status: DISCONTINUED | OUTPATIENT
Start: 2023-12-19 | End: 2023-12-21 | Stop reason: HOSPADM

## 2023-12-19 RX ORDER — BISOPROLOL FUMARATE 5 MG/1
2.5 TABLET, FILM COATED ORAL 2 TIMES DAILY
Status: DISCONTINUED | OUTPATIENT
Start: 2023-12-19 | End: 2023-12-21 | Stop reason: HOSPADM

## 2023-12-19 RX ADMIN — IPRATROPIUM BROMIDE AND ALBUTEROL SULFATE 3 ML: 2.5; .5 SOLUTION RESPIRATORY (INHALATION) at 19:58

## 2023-12-19 RX ADMIN — ASPIRIN 81 MG: 81 TABLET, COATED ORAL at 10:18

## 2023-12-19 RX ADMIN — BISOPROLOL FUMARATE 2.5 MG: 5 TABLET, FILM COATED ORAL at 20:48

## 2023-12-19 RX ADMIN — TOPIRAMATE 50 MG: 25 TABLET, FILM COATED ORAL at 10:12

## 2023-12-19 RX ADMIN — ENOXAPARIN SODIUM 40 MG: 40 INJECTION SUBCUTANEOUS at 06:48

## 2023-12-19 RX ADMIN — IPRATROPIUM BROMIDE AND ALBUTEROL SULFATE 3 ML: 2.5; .5 SOLUTION RESPIRATORY (INHALATION) at 07:41

## 2023-12-19 RX ADMIN — ONDANSETRON 4 MG: 2 INJECTION INTRAMUSCULAR; INTRAVENOUS at 11:12

## 2023-12-19 RX ADMIN — ATORVASTATIN CALCIUM 20 MG: 20 TABLET, FILM COATED ORAL at 21:00

## 2023-12-19 RX ADMIN — DEXTROSE MONOHYDRATE AND SODIUM CHLORIDE 50 ML/HR: 5; .9 INJECTION, SOLUTION INTRAVENOUS at 03:16

## 2023-12-19 RX ADMIN — BISOPROLOL FUMARATE 2.5 MG: 5 TABLET, FILM COATED ORAL at 10:12

## 2023-12-19 RX ADMIN — TOPIRAMATE 50 MG: 25 TABLET, FILM COATED ORAL at 20:47

## 2023-12-19 RX ADMIN — BUSPIRONE HYDROCHLORIDE 5 MG: 5 TABLET ORAL at 20:47

## 2023-12-19 RX ADMIN — LAMOTRIGINE 100 MG: 100 TABLET ORAL at 10:25

## 2023-12-19 RX ADMIN — ACETAMINOPHEN 650 MG: 325 TABLET ORAL at 16:24

## 2023-12-19 RX ADMIN — ARIPIPRAZOLE 5 MG: 5 TABLET ORAL at 10:19

## 2023-12-19 RX ADMIN — TRAZODONE HYDROCHLORIDE 25 MG: 50 TABLET ORAL at 10:13

## 2023-12-19 RX ADMIN — VENLAFAXINE HYDROCHLORIDE 225 MG: 75 CAPSULE, EXTENDED RELEASE ORAL at 10:12

## 2023-12-19 RX ADMIN — POTASSIUM CHLORIDE 20 MEQ: 1500 TABLET, EXTENDED RELEASE ORAL at 10:39

## 2023-12-19 RX ADMIN — BUSPIRONE HYDROCHLORIDE 5 MG: 5 TABLET ORAL at 16:32

## 2023-12-19 RX ADMIN — CEFTRIAXONE SODIUM 1 G: 1 INJECTION, SOLUTION INTRAVENOUS at 10:20

## 2023-12-19 RX ADMIN — ARIPIPRAZOLE 2 MG: 2 TABLET ORAL at 10:11

## 2023-12-19 RX ADMIN — SENNOSIDES 17.2 MG: 8.6 TABLET, FILM COATED ORAL at 10:24

## 2023-12-19 RX ADMIN — BUSPIRONE HYDROCHLORIDE 5 MG: 5 TABLET ORAL at 10:13

## 2023-12-19 RX ADMIN — LAMOTRIGINE 100 MG: 100 TABLET ORAL at 20:47

## 2023-12-19 SDOH — SOCIAL STABILITY: SOCIAL INSECURITY: DOES ANYONE TRY TO KEEP YOU FROM HAVING/CONTACTING OTHER FRIENDS OR DOING THINGS OUTSIDE YOUR HOME?: UNABLE TO ASSESS

## 2023-12-19 SDOH — SOCIAL STABILITY: SOCIAL INSECURITY: HAVE YOU HAD THOUGHTS OF HARMING ANYONE ELSE?: UNABLE TO ASSESS

## 2023-12-19 SDOH — SOCIAL STABILITY: SOCIAL INSECURITY: DO YOU FEEL ANYONE HAS EXPLOITED OR TAKEN ADVANTAGE OF YOU FINANCIALLY OR OF YOUR PERSONAL PROPERTY?: UNABLE TO ASSESS

## 2023-12-19 SDOH — SOCIAL STABILITY: SOCIAL INSECURITY: WERE YOU ABLE TO COMPLETE ALL THE BEHAVIORAL HEALTH SCREENINGS?: NO

## 2023-12-19 SDOH — SOCIAL STABILITY: SOCIAL INSECURITY: ABUSE: ADULT

## 2023-12-19 SDOH — SOCIAL STABILITY: SOCIAL INSECURITY: DO YOU FEEL UNSAFE GOING BACK TO THE PLACE WHERE YOU ARE LIVING?: UNABLE TO ASSESS

## 2023-12-19 SDOH — SOCIAL STABILITY: SOCIAL INSECURITY: HAS ANYONE EVER THREATENED TO HURT YOUR FAMILY OR YOUR PETS?: UNABLE TO ASSESS

## 2023-12-19 SDOH — SOCIAL STABILITY: SOCIAL INSECURITY: ARE YOU OR HAVE YOU BEEN THREATENED OR ABUSED PHYSICALLY, EMOTIONALLY, OR SEXUALLY BY ANYONE?: UNABLE TO ASSESS

## 2023-12-19 SDOH — SOCIAL STABILITY: SOCIAL INSECURITY: ARE THERE ANY APPARENT SIGNS OF INJURIES/BEHAVIORS THAT COULD BE RELATED TO ABUSE/NEGLECT?: UNABLE TO ASSESS

## 2023-12-19 ASSESSMENT — COGNITIVE AND FUNCTIONAL STATUS - GENERAL
PATIENT BASELINE BEDBOUND: UNABLE TO ASSESS AT THIS TIME
TURNING FROM BACK TO SIDE WHILE IN FLAT BAD: A LOT
EATING MEALS: A LOT
MOVING FROM LYING ON BACK TO SITTING ON SIDE OF FLAT BED WITH BEDRAILS: A LOT
MOVING TO AND FROM BED TO CHAIR: A LOT
DAILY ACTIVITIY SCORE: 24
TOILETING: TOTAL
PERSONAL GROOMING: A LOT
CLIMB 3 TO 5 STEPS WITH RAILING: A LOT
WALKING IN HOSPITAL ROOM: A LOT
PERSONAL GROOMING: A LOT
STANDING UP FROM CHAIR USING ARMS: A LOT
MOBILITY SCORE: 12
DAILY ACTIVITIY SCORE: 8
DAILY ACTIVITIY SCORE: 8
WALKING IN HOSPITAL ROOM: A LOT
HELP NEEDED FOR BATHING: TOTAL
TOILETING: TOTAL
MOBILITY SCORE: 12
MOVING TO AND FROM BED TO CHAIR: A LOT
DRESSING REGULAR UPPER BODY CLOTHING: TOTAL
DRESSING REGULAR UPPER BODY CLOTHING: TOTAL
STANDING UP FROM CHAIR USING ARMS: A LOT
EATING MEALS: A LOT
MOBILITY SCORE: 24
TURNING FROM BACK TO SIDE WHILE IN FLAT BAD: A LOT
DRESSING REGULAR LOWER BODY CLOTHING: TOTAL
MOVING FROM LYING ON BACK TO SITTING ON SIDE OF FLAT BED WITH BEDRAILS: A LOT
CLIMB 3 TO 5 STEPS WITH RAILING: A LOT
HELP NEEDED FOR BATHING: TOTAL
DRESSING REGULAR LOWER BODY CLOTHING: TOTAL

## 2023-12-19 ASSESSMENT — ACTIVITIES OF DAILY LIVING (ADL)
PATIENT'S MEMORY ADEQUATE TO SAFELY COMPLETE DAILY ACTIVITIES?: UNABLE TO ASSESS
ADEQUATE_TO_COMPLETE_ADL: UNABLE TO ASSESS
GROOMING: UNABLE TO ASSESS
WALKS IN HOME: UNABLE TO ASSESS
TOILETING: UNABLE TO ASSESS
TOILETING: NEEDS ASSISTANCE
FEEDING YOURSELF: UNABLE TO ASSESS
HEARING - RIGHT EAR: FUNCTIONAL
JUDGMENT_ADEQUATE_SAFELY_COMPLETE_DAILY_ACTIVITIES: UNABLE TO ASSESS
HEARING - RIGHT EAR: FUNCTIONAL
HEARING - LEFT EAR: FUNCTIONAL
LACK_OF_TRANSPORTATION: PATIENT UNABLE TO ANSWER
BATHING: UNABLE TO ASSESS
DRESSING YOURSELF: UNABLE TO ASSESS
DRESSING YOURSELF: UNABLE TO ASSESS
WALKS IN HOME: UNABLE TO ASSESS
LACK_OF_TRANSPORTATION: PATIENT UNABLE TO ANSWER
PATIENT'S MEMORY ADEQUATE TO SAFELY COMPLETE DAILY ACTIVITIES?: UNABLE TO ASSESS
FEEDING YOURSELF: UNABLE TO ASSESS
JUDGMENT_ADEQUATE_SAFELY_COMPLETE_DAILY_ACTIVITIES: UNABLE TO ASSESS
HEARING - LEFT EAR: FUNCTIONAL
ADEQUATE_TO_COMPLETE_ADL: UNABLE TO ASSESS
BATHING: NEEDS ASSISTANCE

## 2023-12-19 ASSESSMENT — LIFESTYLE VARIABLES
HOW OFTEN DO YOU HAVE A DRINK CONTAINING ALCOHOL: PATIENT UNABLE TO ANSWER
HOW OFTEN DO YOU HAVE 6 OR MORE DRINKS ON ONE OCCASION: PATIENT UNABLE TO ANSWER
HOW MANY STANDARD DRINKS CONTAINING ALCOHOL DO YOU HAVE ON A TYPICAL DAY: PATIENT UNABLE TO ANSWER
SKIP TO QUESTIONS 9-10: 0
AUDIT-C TOTAL SCORE: -1
AUDIT-C TOTAL SCORE: -1

## 2023-12-19 ASSESSMENT — PATIENT HEALTH QUESTIONNAIRE - PHQ9
1. LITTLE INTEREST OR PLEASURE IN DOING THINGS: NOT AT ALL
2. FEELING DOWN, DEPRESSED OR HOPELESS: NOT AT ALL
SUM OF ALL RESPONSES TO PHQ9 QUESTIONS 1 & 2: 0

## 2023-12-19 ASSESSMENT — PAIN SCALES - GENERAL
PAINLEVEL_OUTOF10: 0 - NO PAIN
PAINLEVEL_OUTOF10: 0 - NO PAIN

## 2023-12-19 NOTE — PROGRESS NOTES
Speech-Language Pathology    Inpatient Speech-Language Pathology Clinical Swallow Evaluation       Patient Name: Mariella Cuello  MRN: [id number]  : 1945  Today's Date: 23          Reason for Consultation:  Assess oropharyngeal swallow function to r/o aspiration in the setting of acute stroke like sx per stroke protocol     Recommendations:  Risk for Aspiration: No   Solid Diet Recommendations : Regular (IDDSI Level 7)   Liquid Diet Recommendations: Thin (IDDSI Level 0)         Medication Administration Recommendations: Whole, With Liquid, With Pureed      Skilled dysphagia treatment is not warranted  at next level of care.     Defer formal speech/lang/cog evaluation. Pt with baseline alzheimer's. Per review of records and interview with nsg, pt presentation consistent with possible UTI or post-ictal state. Speech/lang/cog eval contraindicated at this time. Please re-consult as needed should any speech/lang/cog deficits persist once medical interventions are completed.    Assessment:  Consistencies Trialed:      Pt participated in diagnostic trials of 5 oz thin water (3 oz successively swallowed), 3 tsp apple sauce, and 1 bobby cracker.     OME and CNE are grossly WFL. Vocal quality and cough are perceptually WFL. Pt's son, Casimiro, denies any prior dysphagia, any recent rapid weight loss, and any recent pneumonias.     Oral phase - Oral mucosa moist and normal in coloration. Mastication, bolus manipulation, and oral clearance timely and functional.     Pharyngeal phase - Although it is a limited assessment technique; Hyolaryngeal elevation/excursion assessed via digital palpation and appeared consistent across all trials. No coughing, choking, nor change in vocal quality present throughout trials. No overt s/s of aspiration present at the bedside.     Relevant Imaging Results:  23 CXR:   IMPRESSION:  No acute cardiopulmonary process.  23 CT brain:    IMPRESSION:  No evidence of acute cortical  infarct or intracranial hemorrhage.    No evidence of intracranial hemorrhage or displaced skull fracture.  Plan:  SLP Plan: No skilled SLP               Discussed POC: Patient, Caregiver/family, Nursing, Other (Comment) (Nurser Practitioner)   Discussed Risks/Benefits: Yes         Skilled dysphagia treatment is not warranted due to patient is at functional baseline      Subjective   Pt laying in bed. Emotional about spouse. Discussed with Son, Casimiro, via telephone who denies any h/o dysphagia with diet modifications and that pt is conversational at baseline.       General Visit Information:  Past medical history:   Per H&P:  Subjective 78 year old female lives in a long term care facility. Reportedly is fairly lucid despite her psychiatric history and history of dementia. Developed altered mental status last evening. Was brought to the ED as a possible stroke but NIH score was 2 (due to aphasia). Work up in the ED was otherwise negative. Hospitalist service was consulted for admission for AMS without specific finding.     In the ED, patient is now following commands and is saying a few works. Still non focal and inconsistent neuro findings.     Medical History[]Expand by Default        Past Medical History:   Diagnosis Date    Alzheimer disease (CMS/[facility])      Anemia      Atherosclerotic heart disease of native coronary artery without angina pectoris 05/12/2021     Atherosclerosis of coronary artery without angina pectoris, unspecified vessel or lesion type, unspecified whether native or transplanted heart    Bipolar disorder (CMS/[facility])      Cardiomyopathy (CMS/[facility])      Chronic systolic (congestive) heart failure (CMS/[facility]) 05/12/2021     Chronic systolic heart failure, ACC/AHA stage C    Depression      Dysphagia      Gastritis      History of COVID-19      Hyperlipidemia      Hypertension      Hypothyroidism      Major depressive disorder, recurrent, unspecified (CMS/[facility]) 05/12/2021     Major depression, recurrent     Obstipation      Respiratory failure (CMS/HCC)      Schizoaffective disorder (CMS/HCC)      Spinal stenosis      Stroke (CMS/HCC)      Systolic heart failure (CMS/HCC)             Pain:    Denies pain      Education:   Patient education completed regarding results and recommendations of SLP evaluation. Pt verbalizes understanding with all patient questions answered to satisfaction.     Care Team involvement:   Communicated with bedside nurse prior to evaluation with clearance for patient participation obtained, Results of the bedside swallowing evaluation were discussed with nurse and verbalized understanding was noted. , and Results of bedside evaluation relayed to Nurse Practitioner via [employer] secure chat

## 2023-12-19 NOTE — CONSULTS
"Nutrition Assessment Note    Nutrition Assessment    Reason for Assessment: Admission nursing screening (MST 2)    Reason for Hospital Admission:  Mariella Cuello is a 78 y.o. female who is admitted for AMS from a LTC facility. Pt presents with aphasia and has a hx of CHF, CKD, and Alzheimer's dementia. Pt had difficulty answering questions. Pt agreeable to supplements to improve intake. Will order ensure compact vanilla TID.    Nutrition History:  Food and Nutrient History: Pt reports she has not been eating but is unable to tell me for what time period  Energy Intake: Poor < 50 %    Anthropometrics:  Ht: 162.6 cm (5' 4.02\"), Wt: 91.5 kg (201 lb 11.5 oz), BMI: 34.61  IBW/kg (Dietitian Calculated): 54.54 kg  Percent of IBW: 168 %  Adjusted Body Weight (kg): 63.78 kg    Weight Change:  Weight History / % Weight Change: Unknown wt changes. No wt changes per chart review             Nutrition Focused Physical Exam Findings:   Subcutaneous Fat Loss  Orbital Fat Pads: Well nourshed (slightly bulging fat pads)  Buccal Fat Pads: Well nourished (full, rounded cheeks)    Muscle Wasting  Temporalis: Well nourished (well-defined muscle)  Pectoralis (Clavicular Region): Well nourished (clavicle not visible)              Nutrition Significant Labs:  Lab Results   Component Value Date    WBC 15.5 (H) 12/18/2023    HGB 14.1 12/18/2023    HCT 41.7 12/18/2023     12/18/2023    CHOL 138 10/02/2023    TRIG 96 10/02/2023    HDL 52.8 10/02/2023    ALT 15 12/19/2023    AST 26 12/19/2023     12/19/2023    K 3.7 12/19/2023     (H) 12/19/2023    CREATININE 0.80 12/19/2023    BUN 11 12/19/2023    CO2 18 (L) 12/19/2023    TSH 4.52 (H) 10/02/2023    INR 1.1 12/18/2023    HGBA1C 4.9 03/01/2022       Current Facility-Administered Medications:     acetaminophen (Tylenol) suppository 650 mg, 650 mg, rectal, q6h PRN, Dona Gee, DO    acetaminophen (Tylenol) tablet 650 mg, 650 mg, oral, q6h PRN, Dona Gee, DO    " [facility] MEDICINE  PROGRESS NOTE    PATIENT ID:  NAME:  Michelle Bella  MRN:               [id number]  YOB: 2023    CC: Birth    ID: Michelle Bella is a 2 days female born at 2023 at 0614 via low transverse  section at a gestation age of 35w5d to a  GBS unknown mother. Mother's blood type is O-, Baby blood type is O+, ANAND negative, Rubella immune, HIV NR, RPR NR, Hepatitis B NR. Apgars at birth 6 and 9. Birth weight of 2690g and most recent repeat weight of 2470g (-8.18%).              Subjective: There were no overnight events.    Diet: Breast feeding    PHYSICAL EXAM:  Vitals:    22 0115 22 0130 22 0800 22 1200   Pulse: 132 128 126 114   Resp: 36 46 48 48   Temp:   37.4 °C (99.3 °F) 36.4 °C (97.6 °F)   TempSrc:   Axillary Axillary   SpO2: 96% 95%     Weight:       Height:       HC:         Temp (24hrs), Av.9 °C (98.4 °F), Min:36.4 °C (97.6 °F), Max:37.4 °C (99.3 °F)    Pulse Oximetry: 95 %, O2 Delivery Device: None - Room Air    Intake/Output Summary (Last 24 hours) at 2022 1604  Last data filed at 2022 1830  Gross per 24 hour   Intake 60 ml   Output --   Net 60 ml     3 %ile (Z= -1.86) based on WHO (Girls, 0-2 years) weight-for-recumbent length data based on body measurements available as of 2022.     Percent Weight Loss: -8%    General: sleeping in no acute distress, awakens appropriately  Skin: Pink, warm and dry, no jaundice   HEENT: Fontanelles open, soft and flat  Chest: Symmetric respirations  Lungs: CTAB with no retractions/grunts   Cardiovascular: normal S1/S2, RRR, no murmurs.  Abdomen: Soft without masses, nl umbilical stump   Extremities: EVANS, warm and well-perfused    LAB TESTS:   Recent Labs     22  0833   WBC 21.6*   RBC 4.30   HEMOGLOBIN 15.6   HEMATOCRIT 43.7   .6   MCH 36.3   RDW 61.7   PLATELETCT 399*   MPV 9.1*   NEUTSPOLYS 53.90   LYMPHOCYTES 30.40   MONOCYTES 10.40   EOSINOPHILS 4.40*    BASOPHILS 0.00   RBCMORPHOLO Present         Recent Labs     22  0750   GLUCOSE 62         ASSESSMENT/PLAN: 2 days female     1. Term infant. Routine  care.  2. Vitals stable, exam wnl  3. Feeding, voiding, stooling  4. Weight down -8%  5. Dispo: anticipated discharge this afternoon.   6. Follow up: [facility] without 3-5 days of discharge.       Bard Saucedo MD  PGY-1  Family Medicine Residency        ARIPiprazole (Abilify) tablet 2 mg, 2 mg, oral, Daily, Dona Gee, DO, 2 mg at 12/19/23 1011    ARIPiprazole (Abilify) tablet 5 mg, 5 mg, oral, Daily, Dona Gee, DO, 5 mg at 12/19/23 1019    aspirin EC tablet 81 mg, 81 mg, oral, Daily, Dona Gee, DO, 81 mg at 12/19/23 1018    atorvastatin (Lipitor) tablet 20 mg, 20 mg, oral, Nightly, Dona Gee DO    bisacodyl (Dulcolax) suppository 10 mg, 10 mg, rectal, Daily PRN, Dona Gee DO    bisoprolol (Zebeta) tablet 2.5 mg, 2.5 mg, oral, BID, Dona Gee DO, 2.5 mg at 12/19/23 1012    busPIRone (Buspar) tablet 5 mg, 5 mg, oral, TID, Dona Gee DO, 5 mg at 12/19/23 1013    cefTRIAXone (Rocephin) IVPB 1 g, 1 g, intravenous, q24h, Dona Flores, APRN-CNP, Last Rate: 100 mL/hr at 12/19/23 1020, 1 g at 12/19/23 1020    D5 % and 0.9 % sodium chloride infusion, 50 mL/hr, intravenous, Continuous, Dona Gee DO, Last Rate: 50 mL/hr at 12/19/23 0316, 50 mL/hr at 12/19/23 0316    enoxaparin (Lovenox) syringe 40 mg, 40 mg, subcutaneous, Daily, Dona Gee DO, 40 mg at 12/19/23 0648    ipratropium-albuteroL (Duo-Neb) 0.5-2.5 mg/3 mL nebulizer solution 3 mL, 3 mL, nebulization, TID, Dona Gee DO, 3 mL at 12/19/23 0741    ipratropium-albuteroL (Duo-Neb) 0.5-2.5 mg/3 mL nebulizer solution 3 mL, 3 mL, nebulization, q2h PRN, Dona Gee DO    lamoTRIgine (LaMICtal) tablet 100 mg, 100 mg, oral, BID, Dona Gee DO, 100 mg at 12/19/23 1025    levothyroxine (Synthroid, Levoxyl) tablet 112 mcg, 112 mcg, oral, Daily, Dona Gee DO    LORazepam (Ativan) injection 1 mg, 1 mg, intravenous, q5 min PRN, Dona Gee DO    ondansetron (Zofran) injection 4 mg, 4 mg, intravenous, q6h PRN, Dona Flores, APRN-CNP, 4 mg at 12/19/23 1112    potassium chloride CR (Klor-Con M20) ER tablet 20 mEq, 20 mEq, oral, Daily with breakfast, Dona Gee DO, 20 mEq at 12/19/23 1039    sennosides (Senokot)  tablet 17.2 mg, 2 tablet, oral, Daily, Dona Gee, DO, 17.2 mg at 12/19/23 1024    topiramate (Topamax) tablet 50 mg, 50 mg, oral, BID, Dona Gee DO, 50 mg at 12/19/23 1012    traZODone (Desyrel) tablet 25 mg, 25 mg, oral, Daily, Dona BK Gee, DO, 25 mg at 12/19/23 1013    venlafaxine XR (Effexor-XR) 24 hr capsule 225 mg, 225 mg, oral, Daily, Dona Gee, DO, 225 mg at 12/19/23 1012    Dietary Orders (From admission, onward)       Start     Ordered    12/19/23 0917  Adult diet Regular  Diet effective now        Question:  Diet type  Answer:  Regular    12/19/23 0916                  Estimated Needs:   Estimated Energy Needs  Total Energy Estimated Needs (kCal): 1595 kCal  Total Estimated Energy Need per Day (kCal/kg): 25 kCal/kg  Method for Estimating Needs: adjusted wt    Estimated Protein Needs  Total Protein Estimated Needs (g): 64 g  Total Protein Estimated Needs (g/kg): 1 g/kg  Method for Estimating Needs: adjusted wt    Estimated Fluid Needs  Total Fluid Estimated Needs (mL): 1595 mL  Method for Estimating Needs: 1 ml/kcal        Nutrition Diagnosis   Nutrition Diagnosis:  Malnutrition Diagnosis  Patient has Malnutrition Diagnosis: No    Nutrition Diagnosis  Patient has Nutrition Diagnosis: Yes  Diagnosis Status (1): New  Nutrition Diagnosis 1: Inadequate oral intake  Related to (1): decreased ability to consume sufficient energy  As Evidenced by (1): poor po intake       Nutrition Interventions/Recommendations   Nutrition Interventions and Recommendations:    Nutrition Prescription:  Individualized Nutrition Prescription Provided for : 1595 kcal and 64 g protein provided via diet    Nutrition Interventions:   Food and/or Nutrient Delivery Interventions  Interventions: Meals and snacks, Medical food supplement  Meals and Snacks: General healthful diet  Goal: provide as ordered  Medical Food Supplement: Commercial beverage  Goal: ensure compact vanilla TID to provide 220 kcal and 9  protein each    Education Documentation  No documentation found.           Nutrition Monitoring and Evaluation   Monitoring/Evaluation:   Food/Nutrient Related History Monitoring  Monitoring and Evaluation Plan: Energy intake, Fluid intake  Energy Intake: Estimated energy intake  Criteria: Pt will consume >/= 75% of estimated energy needs  Fluid Intake: Estimated fluid intake  Criteria: Pt will consume >/= 75% of supplements         Time Spent/Follow-up:   Follow Up  Time Spent (min): 30 minutes  Last Date of Nutrition Visit: 12/19/23  Nutrition Follow-Up Needed?: 5-7 days  Follow up Comment: 12/26/23

## 2023-12-19 NOTE — H&P
Subjective 78 year old female lives in a long term care facility. Reportedly is fairly lucid despite her psychiatric history and history of dementia. Developed altered mental status last evening. Was brought to the ED as a possible stroke but NIH score was 2 (due to aphasia). Work up in the ED was otherwise negative. Hospitalist service was consulted for admission for AMS without specific finding.    In the ED, patient is now following commands and is saying a few works. Still non focal and inconsistent neuro findings.    Past Medical History:   Diagnosis Date    Alzheimer disease (CMS/[facility])     Anemia     Atherosclerotic heart disease of native coronary artery without angina pectoris 05/12/2021    Atherosclerosis of coronary artery without angina pectoris, unspecified vessel or lesion type, unspecified whether native or transplanted heart    Bipolar disorder (CMS/[facility])     Cardiomyopathy (CMS/[facility])     Chronic systolic (congestive) heart failure (CMS/[facility]) 05/12/2021    Chronic systolic heart failure, ACC/AHA stage C    Depression     Dysphagia     Gastritis     History of COVID-19     Hyperlipidemia     Hypertension     Hypothyroidism     Major depressive disorder, recurrent, unspecified (CMS/[facility]) 05/12/2021    Major depression, recurrent    Obstipation     Respiratory failure (CMS/[facility])     Schizoaffective disorder (CMS/[facility])     Spinal stenosis     Stroke (CMS/[facility])     Systolic heart failure (CMS/[facility])        Past Surgical History:   Procedure Laterality Date    CATARACT EXTRACTION  02/29/2016    Cataract Surgery    CORONARY ARTERY BYPASS GRAFT      MR HEAD ANGIO WO IV CONTRAST  01/22/2014    MR HEAD ANGIO WO IV CONTRAST [facility]    TONSILLECTOMY  02/29/2016    Tonsillectomy    TOTAL ABDOMINAL HYSTERECTOMY  02/29/2016    Total Abdominal Hysterectomy       No Known Allergies     Review of systems:  Unable to obtain        Prior to Admission medications    Medication Sig Start Date End Date Taking? Authorizing  Provider   acetaminophen (Tylenol) 325 mg tablet Take 2 tablets (650 mg) by mouth every 6 hours if needed for mild pain (1 - 3) or fever (temp greater than 38.0 C). 8/6/18  Yes Historical Provider, MD   ARIPiprazole (Abilify) 2 mg tablet Take 1 tablet (2 mg) by mouth once daily. 10/31/23  Yes Historical Provider, MD   ARIPiprazole (Abilify) 5 mg tablet Take 1 tablet (5 mg) by mouth once daily. 3/2/21  Yes Historical Provider, MD   atorvastatin (Lipitor) 20 mg tablet Take 1 tablet (20 mg) by mouth once daily. 3/2/21  Yes Historical Provider, MD   bisoprolol (Zebeta) 5 mg tablet Take 0.5 tablets (2.5 mg) by mouth 2 times a day. 10/31/23  Yes Historical Provider, MD   busPIRone (Buspar) 5 mg tablet Take 1 tablet (5 mg) by mouth 3 times a day. 10/31/23  Yes Historical Provider, MD   cholecalciferol (Vitamin D-3) 5,000 Units tablet Take 1 tablet (5,000 Units) by mouth once daily. 3/2/21  Yes Historical Provider, MD   diclofenac epolamine 1.3 % patch Place 1 patch on the skin every 12 hours. 12/3/23  Yes Historical Provider, MD   furosemide (Lasix) 20 mg tablet Take 1 tablet (20 mg) by mouth once daily. Every Friday 10/26/21  Yes Historical Provider, MD   guaiFENesin (Robitussin) 100 mg/5 mL syrup Take 10 mL (200 mg) by mouth every 4 hours if needed for cough. 1/10/22  Yes Historical Provider, MD   lamoTRIgine (LaMICtal) 100 mg tablet Take 1 tablet (100 mg) by mouth 2 times a day. 3/2/21  Yes Historical Provider, MD   potassium chloride CR 20 mEq ER tablet Take 1 tablet (20 mEq) by mouth once daily. 10/31/23  Yes Historical Provider, MD   traZODone (Desyrel) 50 mg tablet Take 0.5 tablets (25 mg) by mouth once daily. 3/6/21  Yes Historical Provider, MD   venlafaxine XR (Effexor-XR) 75 mg 24 hr capsule Take 3 capsules (225 mg) by mouth once daily. 3/2/21  Yes Historical Provider, MD   acetaminophen (Tylenol) 650 mg suppository Insert 1 suppository (650 mg) into the rectum every 6 hours if needed for mild pain (1 - 3) or  fever (temp greater than 38.0 C).    Historical Provider, MD   albuterol sulfate (Proair Digihaler) 90 mcg/actuation aero powdr breath act w/sensor inhaler Inhale 2 puffs every 6 hours if needed for wheezing.    Historical Provider, MD   alum-mag hydroxide-simeth (Maalox MAX) 400-400-40 mg/5 mL suspension Take 30 mL by mouth every 4 hours if needed for indigestion or heartburn.    Historical Provider, MD   aspirin 81 mg EC tablet Take 1 tablet (81 mg) by mouth once daily.    Historical Provider, MD   bisacodyl (Dulcolax) 10 mg suppository Insert 1 suppository (10 mg) into the rectum once daily as needed for constipation.    Historical Provider, MD   diclofenac sodium (Voltaren) 1 % gel gel Apply 1 Application topically every 8 hours if needed (arthritis pain).    Historical Provider, MD   docusate sodium (Colace) 100 mg capsule Take 1 capsule (100 mg) by mouth 2 times a day.    Historical Provider, MD   levothyroxine (Synthroid, Levoxyl) 100 mcg tablet Take 112 mcg by mouth once daily.    Historical Provider, MD   loperamide (Imodium A-D) 2 mg tablet Take 1 tablet (2 mg) by mouth every 3 hours if needed for diarrhea.    Historical Provider, MD   magnesium hydroxide (Milk of Magnesia) 2,400 mg/10 mL suspension suspension Take 10 mL by mouth once daily as needed for constipation.    Historical Provider, MD   melatonin 3 mg capsule Take 6 mg by mouth once daily.    Historical Provider, MD   ondansetron (Zofran) 4 mg tablet Take 1 tablet (4 mg) by mouth every 8 hours if needed for nausea or vomiting.    Historical Provider, MD   sennosides (Senokot) 8.6 mg tablet Take 2 tablets (17.2 mg) by mouth once daily.    Historical Provider, MD   sodium phosphates (Fleets) 19-7 gram/118 mL enema enema Insert 118 mL (1 enema) into the rectum once daily as needed for constipation.    Historical Provider, MD   topiramate (Topamax) 50 mg tablet Take 1 tablet (50 mg) by mouth 2 times a day.    Historical Provider, MD   traMADol  (Ultram) 50 mg tablet Take 1 tablet (50 mg) by mouth 2 times a day as needed for severe pain (7 - 10). 12/14/23   Alirio Augustine MD   traMADol (Ultram) 50 mg tablet Take 1 tablet (50 mg) by mouth 2 times a day. 11/29/23 12/14/23  Alirio Augustine MD        Vitals:    12/19/23 0122   BP: 131/72   Pulse: 86   Resp: 22   Temp:    SpO2: 99%     General: Awake. Follows commands  HEENT: PERRL with EOMI but inconsistently tracks movement in the room. Starting to say a few dysarthric words but is still having a difficult time trying to say a word. Slight left facial droop but she protrudes her tongue midline.  NECK: Baseline ROM  CV: HRRR  PULM: LCTA all lobes. No supplemental oxygen in use  GI: Abd soft, non-tender, normoactive bowel sounds all quads  MS: MAEW. No overt deformity  NEURO:  No lateralizing neuro deficits. Left  is weaker than the right, but she can raise her left arm high and faster than right arm. Right leg is weaker than left leg.  SKIN: warm and dry. Bruising noted MCP joint left 4th finger, bruising medical aspect of right lower leg and ankl.[id number]    Results for orders placed or performed during the hospital encounter of 12/18/23 (from the past 24 hour(s))   POCT GLUCOSE   Result Value Ref Range    POCT Glucose 111 (H) 74 - 99 mg/dL   CBC and Auto Differential   Result Value Ref Range    WBC 15.5 (H) 4.4 - 11.3 x10*3/uL    nRBC 0.0 0.0 - 0.0 /100 WBCs    RBC 4.24 4.00 - 5.20 x10*6/uL    Hemoglobin 14.1 12.0 - 16.0 g/dL    Hematocrit 41.7 36.0 - 46.0 %    MCV 98 80 - 100 fL    MCH 33.3 26.0 - 34.0 pg    MCHC 33.8 32.0 - 36.0 g/dL    RDW 12.2 11.5 - 14.5 %    Platelets 242 150 - 450 x10*3/uL    Neutrophils % 70.7 40.0 - 80.0 %    Immature Granulocytes %, Automated 0.5 0.0 - 0.9 %    Lymphocytes % 19.8 13.0 - 44.0 %    Monocytes % 8.1 2.0 - 10.0 %    Eosinophils % 0.6 0.0 - 6.0 %    Basophils % 0.3 0.0 - 2.0 %    Neutrophils Absolute 10.93 (H) 1.60 - 5.50 x10*3/uL    Immature Granulocytes Absolute,  Automated 0.08 0.00 - 0.50 x10*3/uL    Lymphocytes Absolute 3.06 (H) 0.80 - 3.00 x10*3/uL    Monocytes Absolute 1.25 (H) 0.05 - 0.80 x10*3/uL    Eosinophils Absolute 0.09 0.00 - 0.40 x10*3/uL    Basophils Absolute 0.04 0.00 - 0.10 x10*3/uL   Comprehensive metabolic panel   Result Value Ref Range    Glucose 109 (H) 65 - 99 mg/dL    Sodium 136 133 - 145 mmol/L    Potassium 4.1 3.4 - 5.1 mmol/L    Chloride 105 97 - 107 mmol/L    Bicarbonate 21 (L) 24 - 31 mmol/L    Urea Nitrogen 12 8 - 25 mg/dL    Creatinine 1.00 0.40 - 1.60 mg/dL    eGFR 57 (L) >60 mL/min/1.73m*2    Calcium 9.8 8.5 - 10.4 mg/dL    Albumin 3.5 3.5 - 5.0 g/dL    Alkaline Phosphatase 99 35 - 125 U/L    Total Protein 7.6 5.9 - 7.9 g/dL    AST 30 5 - 40 U/L    Bilirubin, Total 0.6 0.1 - 1.2 mg/dL    ALT 18 5 - 40 U/L    Anion Gap 10 <=19 mmol/L   Troponin T, High Sensitivity   Result Value Ref Range    Troponin T, High Sensitivity 11 <=15 ng/L   Protime-INR   Result Value Ref Range    Protime 11.4 9.3 - 12.7 seconds    INR 1.1 0.9 - 1.2   APTT   Result Value Ref Range    aPTT 28.9 22.0 - 32.5 seconds   Urinalysis with Reflex Microscopic and Culture   Result Value Ref Range    Color, Urine Yellow Light-Yellow, Yellow, Dark-Yellow    Appearance, Urine Clear Clear    Specific Gravity, Urine 1.018 1.005 - 1.035    pH, Urine 6.5 5.0, 5.5, 6.0, 6.5, 7.0, 7.5, 8.0    Protein, Urine NEGATIVE NEGATIVE, 10 (TRACE), 20 (TRACE) mg/dL    Glucose, Urine Normal Normal mg/dL    Blood, Urine NEGATIVE NEGATIVE    Ketones, Urine NEGATIVE NEGATIVE mg/dL    Bilirubin, Urine NEGATIVE NEGATIVE    Urobilinogen, Urine 2 (1+) (A) Normal mg/dL    Nitrite, Urine 2+ (A) NEGATIVE    Leukocyte Esterase, Urine 25 Collin/µL (A) NEGATIVE   Microscopic Only, Urine   Result Value Ref Range    WBC, Urine 1-5 1-5, NONE /HPF    RBC, Urine 1-2 NONE, 1-2, 3-5 /HPF    Squamous Epithelial Cells, Urine 1-9 (SPARSE) Reference range not established. /HPF    Mucus, Urine FEW Reference range not  established. /LPF   Sars-CoV-2 PCR, Symptomatic   Result Value Ref Range    Coronavirus 2019, PCR Not Detected Not Detected   Influenza A, and B PCR   Result Value Ref Range    Flu A Result Not Detected Not Detected    Flu B Result Not Detected Not Detected   RSV PCR   Result Value Ref Range    RSV PCR Not Detected Not Detected        CT brain attack head wo IV contrast   Final Result   No evidence of acute cortical infarct or intracranial hemorrhage.        No evidence of intracranial hemorrhage or displaced skull fracture.        MACRO:   Cheyanne Kelley discussed the significance and urgency of this   critical finding by telephone with  NILE MURGUIA on 12/18/2023 at 9:54   pm.  (**-RCF-**) Findings:  See findings.             Signed by: Cheyanne Kelley 12/18/2023 9:54 PM   Dictation workstation:   [id number]      XR chest 1 view    (Results Pending)        Principal Problem:    Altered mental status, unspecified altered mental status type - this is more consistent with a post ictal state than a CVA. Takes several seizure medications although no documented diagnosis of seizures. Add seizure precautions. Consult neurology for evalution and recommendations. Will hold off on ordering additional diagnostic studies at this time. Also, unsure of her true baseline and treatment options given her extensive medical history.  Active Problems:    Acquired hypothyroidism - continue replacement    Bipolar disorder (CMS/HCC) - continue meds    Schizoaffective disorder (CMS/HCC) - continue meds    Alzheimer's dementia (CMS/HCC) - unsure baseline. Continue meds   Hypertension - continue meds    NPO until seen by ST.    Patient is DNR with no CPR/no intubation and no ICU

## 2023-12-19 NOTE — PROGRESS NOTES
During rounds, bedside nurse advised that patient is LTC at [facility].  She advised that son, Casimiro, would like patient to return.  Return referral submitted to [facility] for processing.  Awaiting updates.  No PT/OT orders at this time.  RN TCC following.    1155  Peer Carol, patient able to return to [facility] at discharge.  She is LTC .  RN TCC following.    Ira Sherman RN

## 2023-12-19 NOTE — ED PROVIDER NOTES
Dr Almanza read xray and said it was ok to be DC'd.  talked with patient and mom.  Pt stable upon DC   HPI   Chief Complaint   Patient presents with    Altered Mental Status       HPI  See MDM                  [place] Coma Scale Score: 14         NIH Stroke Scale: 7          Patient History   Past Medical History:   Diagnosis Date    Alzheimer disease (CMS/HCC)     Anemia     Atherosclerotic heart disease of native coronary artery without angina pectoris 05/12/2021    Atherosclerosis of coronary artery without angina pectoris, unspecified vessel or lesion type, unspecified whether native or transplanted heart    Bipolar disorder, currently in remission, most recent episode unspecified (CMS/HCC) 05/12/2021    History of depressed bipolar disorder    Cardiomyopathy (CMS/HCC)     Chronic systolic (congestive) heart failure (CMS/HCC) 05/12/2021    Chronic systolic heart failure, ACC/AHA stage C    COVID-19     Depression     Dysphagia     Gastritis     Hypertension     Hypothyroidism     Major depressive disorder, recurrent, unspecified (CMS/HCC) 05/12/2021    Major depression, recurrent    Personal history of diseases of the blood and blood-forming organs and certain disorders involving the immune mechanism 05/12/2021    History of anemia    Personal history of other diseases of the digestive system 05/12/2021    History of constipation    Personal history of other endocrine, nutritional and metabolic disease 05/12/2021    History of hyperlipidemia    Personal history of other endocrine, nutritional and metabolic disease 05/12/2021    History of hypothyroidism    Personal history of other specified conditions 05/12/2021    History of insomnia    Respiratory failure (CMS/HCC)     Schizoaffective disorder (CMS/HCC)     Spinal stenosis     Stroke (CMS/HCC)     Systolic heart failure (CMS/HCC)      Past Surgical History:   Procedure Laterality Date    CATARACT EXTRACTION  02/29/2016    Cataract Surgery    CORONARY ARTERY BYPASS GRAFT      MR HEAD ANGIO WO IV CONTRAST  01/22/2014    MR HEAD ANGIO WO IV CONTRAST [facility]    TONSILLECTOMY  02/29/2016    Tonsillectomy    TOTAL ABDOMINAL HYSTERECTOMY  02/29/2016    Total Abdominal Hysterectomy     No family history on file.  Social History     Tobacco Use    Smoking status: Unknown    Smokeless tobacco: Not on file   Substance Use Topics    Alcohol use: Not on file    Drug use: Not on file       Physical Exam   ED Triage Vitals [12/18/23 2138]   Temp Heart Rate Resp BP   35.6 °C (96.1 °F) 87 23 108/85      SpO2 Temp Source Heart Rate Source Patient Position   94 % Temporal Monitor Lying      BP Location FiO2 (%)     Right arm --       Physical Exam  See [facility]  ED Course & [facility]   ED Course as of 12/18/23 2323   Mon Dec 18, 2023   2146 EKG presented to me at 9:46 PM     EKG as interpreted by me shows normal sinus rhythm at a rate of 86 bpm, left axis deviation with left bundle branch block, negative Sgarbossa.   [DH]      ED Course User Index  [DH] Trey Simon MD         Diagnoses as of 12/18/23 2323   Altered mental status, unspecified altered mental status type       Medical Decision Making  78-year-old female with past medical history of CHF, bipolar, CKD presenting to the emergency room with altered mental status.  According to [facility]-term care [facility] patient last known well was around 6 PM and around 9 PM upon arrival of night nurse they noted her to be confused.  Patient's typical mental status is alert and oriented x 4 per nursing report and reportedly only received Tylenol this evening and otherwise not her typical tramadol or any other sedating medications.  Patient at this time denies any significant complaints but does express confusion as to why she is here in the hospital.    Vital signs reviewed: Afebrile, 87 bpm, normotensive, 94% on room air    Exam     Constitutional: No acute distress. Resting comfortably.   Head: Normocephalic, atraumatic.   Eyes: Right pupil is 2 mm and responsive left pupil is 1.5 mm and responsive, EOM grossly intact, conjunctiva  normal.  Mouth/Throat: Oropharynx is clear, moist mucus membranes.   Neck: Supple. No lymphadenopathy.  Cardiovascular: Regular rate and regular rhythm. Extremities are well-perfused.   Pulmonary/Chest: No respiratory distress, breathing comfortably on room air.    Abdominal: Soft, non-tender, non-distended. No rebound or guarding.   Musculoskeletal: No lower extremity edema.       Skin: Warm, dry, and intact.   Neurological: Patient is oriented to person only. Face symmetric, hearing intact to voice, speech normal. Moves all extremities.   Psych: Mood, affect, thought content, and judgment normal.    Differential includes but is not limited to:  Altered mental status secondary to metabolic encephalopathy secondary to urinary tract infection versus pneumonia versus viral illness versus ischemic stroke versus transient ischemic attack    Amount and/or Complexity of Data Reviewed  External Data Reviewed: notes.      Labs: ordered.    Radiology: ordered and independent interpretation performed.  CT head as interpreted by me shows no large intracranial hemorrhage at this time    ECG/medicine tests: ordered and independent interpretation performed.  EKG as interpreted by me as detailed in ED course above.    Lab work at this time is notable for leukocytosis to 15 but no anemia, CMP within normal limits with no significant abnormality, urinalysis is unremarkable and initial troponin is 11.  Chest x-ray per my initial interpretation shows low lung volumes but otherwise no significant evidence of consolidation at this time.    Discussion of management or test interpretation with external provider(s):  Patient with continued altered mental status that is different from her baseline with no obvious explanation at this time and will require further workup here in the hospital.  Patient discussed with Dr. Molina regarding presentation and she agrees for admission at this time for further evaluation and  workup.  Procedure  Procedures     Trey Simon MD  12/18/23 3075

## 2023-12-19 NOTE — CARE PLAN
The patient's goals for the shift include      The clinical goals for the shift include Safety, Seizure precautions, Neuro checks    Over the shift, the patient did not make progress toward the following goals. Barriers to progression include . Recommendations to address these barriers include .

## 2023-12-19 NOTE — CONSULTS
Consults    Reason For Consult:   Seizure vs CVA        History Of Present Illness  Mariella Cuello is a 78 y.o. female was referred to ED from long-term care facility for altered mental status with change of mental status of acute onset patient was last seen normal 3 hours prior to onset of this confusion.  She is described as aphasic and slightly confused and was admitted for further workup.  She is noted to have mild leukocytosis was nitrate and leukocytes in urine, treatment for UTI was initiated and her head CT showed no acute infarction or bleed.  Chest x-ray showed no acute cardiopulmonary process.  She has been afebrile since admission, with heart rate ranging from mid 80s to 110s and sinus tachycardia on telemetry.    She has already been seen by speech therapy without dysphagia or aphasia    Past Medical History  She has a past medical history of Alzheimer disease (CMS/[facility]), Anemia, Atherosclerotic heart disease of native coronary artery without angina pectoris (05/12/2021), Bipolar disorder (CMS/[facility]), Cardiomyopathy (CMS/[facility]), Chronic systolic (congestive) heart failure (CMS/[facility]) (05/12/2021), Depression, Dysphagia, Gastritis, History of COVID-19, Hyperlipidemia, Hypertension, Hypothyroidism, Major depressive disorder, recurrent, unspecified (CMS/HCC) (05/12/2021), Obstipation, Respiratory failure (CMS/[facility]), Schizoaffective disorder (CMS/[facility]), Spinal stenosis, Stroke (CMS/[facility]), and Systolic heart failure (CMS/[facility]).    Surgical History  She has a past surgical history that includes Tonsillectomy (02/29/2016); Cataract extraction (02/29/2016); Total abdominal hysterectomy (02/29/2016); MR angio head wo IV contrast (01/22/2014); and Coronary artery bypass graft.     Social History  She has no history on file for tobacco use, alcohol use, and drug use.    Family History  No family history on file.     Allergies  Patient has no known allergies.    Review of Systems  History of dementia and mild cognitive  impairment with gait difficulty and mood changes and history of bipolar disorder and insomnia  No reported fever or chills at SNF  No reported seizures    Physical Exam  Elderly female, no family at bedside was asleep lightly awaken when called  Oriented to self and can tell her name but not her age  No dysarthria but is she may have language difficulty as she was unable to name simple items  No ptosis or nystagmus no facial asymmetry  She is moving both of her hands equally  Grimaces from pain with movement or manipulation of her lower extremities and feet  Hyperreflexic all over, no Babinski  Unable to test cerebellar, gait, or cognitive status well  No carotid bruits on neck auscultation.  Regular heart sounds on cardiac auscultation  Crackles in the bases with lung auscultation  Soft abdomen positive bowel sounds no tenderness  Nonpitting edema at the ankles     Last Recorded Vitals  /60 (BP Location: Left arm, Patient Position: Lying)   Pulse 108   Temp 36.8 °C (98.2 °F) (Temporal)   Resp 20   Wt 91.5 kg (201 lb 11.5 oz)   SpO2 100%       Relevant Results  Reviewed head CT scan, chest x-ray and other labs on admission     Assessment/Plan     Likely metabolic encephalopathy secondary to UTI.   ? aphasia  ? bronchitis (cough with greenish phlegm per nursing staff)  Tachycardia  History of dementia likely of mild Alzheimer's type  History of bipolar disorder  Hyperlipidemia and hypertension    Cannot rule out ischemic event /CVA or a postictal phase from unwitnessed seizure  From her history and current examination    Recommend brain MRI scan if the patient is not back to her baseline or at least gradually improving by tomorrow.  .    Seizure precautions and recommend an EEG for any acute change of mental status or involuntary movement or jerking observed during this hospitalization  Continue outpatient meds of lamotrigine and topiramate, trazodone and Effexor  Aspirin daily, DVT prophylaxis  IV  hydration and antibiotics    Avoid sedation and narcotics and tramadol (due to its seizure threshold lowering effects)    Jacinta Randhawa MD  Neurology  [phone]

## 2023-12-19 NOTE — PROGRESS NOTES
Mariella Cuello is a 78 y.o. female on day 0 of admission presenting with Altered mental status, unspecified altered mental status type.      Subjective   Admitted from [facility] this am for AMS. Pt si alert and responsive. Noted to have aphasia. Awaiting Neuro eval and speech eval.        Objective     Last Recorded Vitals  /61 (BP Location: Left arm, Patient Position: Lying)   Pulse 95   Temp 36.7 °C (98.1 °F) (Temporal)   Resp 20   Wt 91.5 kg (201 lb 11.5 oz)   SpO2 97%   Intake/Output last 3 Shifts:  No intake or output data in the 24 hours ending 12/19/23 0901    Admission Weight  Weight: 91.5 kg (201 lb 11.5 oz) (12/18/23 2138)    Daily Weight  12/19/23 : 91.5 kg (201 lb 11.5 oz)    Image Results  XR chest 1 view  Narrative: Interpreted By:  Moris Paris,   STUDY:  XR CHEST 1 VIEW; 12/18/2023 11:22 pm      INDICATION:  Signs/Symptoms:ams      COMPARISON:  06/03/2022      ACCESSION NUMBER(S):  [id number]      ORDERING CLINICIAN:  NILE MURGUIA      TECHNIQUE:  1 AP projection.      FINDINGS:  Sternotomy wires are noted.      The cardiomediastinal silhouette is unremarkable. Perihilar scarring  is noted. No focal consolidation is seen. No pleural effusion is  identified.      The osseous structures are intact.      Impression: No acute cardiopulmonary process.      MACRO:  None      Signed by: Morsi Paris 12/19/2023 7:53 AM  Dictation workstation:   [id number]  XR chest 1 view    Result Date: 12/19/2023  Interpreted By:  Moris Paris, STUDY: XR CHEST 1 VIEW; 12/18/2023 11:22 pm   INDICATION: Signs/Symptoms:ams   COMPARISON: 06/03/2022   ACCESSION NUMBER(S): [id number]   ORDERING CLINICIAN: NILE MURGUIA   TECHNIQUE: 1 AP projection.   FINDINGS: Sternotomy wires are noted.   The cardiomediastinal silhouette is unremarkable. Perihilar scarring is noted. No focal consolidation is seen. No pleural effusion is identified.   The osseous structures are intact.       No acute cardiopulmonary process.    MACRO: None   Signed by: Moris Paris 12/19/2023 7:53 AM Dictation workstation:   [id number]    CT brain attack head wo IV contrast    Result Date: 12/18/2023  Interpreted By:  Cheyanne Kelley, STUDY: CT BRAIN ATTACK HEAD WO IV CONTRAST;  12/18/2023 9:26 pm   INDICATION: Signs/Symptoms:Stroke Evaluation.   COMPARISON: None.   ACCESSION NUMBER(S): [id number]   ORDERING CLINICIAN: NILE MURGUIA   TECHNIQUE: Noncontrast axial CT scan of head was performed. Angled reformats in brain and bone windows were generated. The images were reviewed in bone, brain, blood and soft tissue windows.   FINDINGS: CSF Spaces: The ventricles, sulci and basal cisterns are within normal limits. There is no extraaxial fluid collection. Metal streak artifact is noted in the skull base from aneurysm coils.   Parenchyma:  The grey-white differentiation is intact. There is no mass effect or midline shift.  There is no intracranial hemorrhage.   Calvarium: The calvarium is unremarkable.   Paranasal sinuses and mastoids: Visualized paranasal sinuses and mastoids are clear.       No evidence of acute cortical infarct or intracranial hemorrhage.   No evidence of intracranial hemorrhage or displaced skull fracture.   MACRO: Cheyanne Kelley discussed the significance and urgency of this critical finding by telephone with  NILE MURGUIA on 12/18/2023 at 9:54 pm.  (**-RCF-**) Findings:  See findings.     Signed by: Cheyanne Kelley 12/18/2023 9:54 PM Dictation workstation:   [id number]       Physical Exam  Constitutional:       Comments: Aphasic   HENT:      Head: Normocephalic and atraumatic.      Nose: Nose normal.      Mouth/Throat:      Mouth: Mucous membranes are moist.      Pharynx: Oropharynx is clear.   Eyes:      Extraocular Movements: Extraocular movements intact.      Pupils: Pupils are equal, round, and reactive to light.   Cardiovascular:      Rate and Rhythm: Normal rate and regular rhythm.      Pulses: Normal pulses.   Pulmonary:       Effort: Pulmonary effort is normal.      Breath sounds: Normal breath sounds.   Abdominal:      General: Abdomen is flat. Bowel sounds are normal.      Palpations: Abdomen is soft.   Musculoskeletal:         General: Normal range of motion.   Skin:     General: Skin is warm and dry.      Capillary Refill: Capillary refill takes less than 2 seconds.   Neurological:      General: No focal deficit present.      Mental Status: She is alert and oriented to person, place, and time.   Psychiatric:         Mood and Affect: Mood normal.       Results for orders placed or performed during the hospital encounter of 12/18/23 (from the past 24 hour(s))   POCT GLUCOSE   Result Value Ref Range    POCT Glucose 111 (H) 74 - 99 mg/dL   CBC and Auto Differential   Result Value Ref Range    WBC 15.5 (H) 4.4 - 11.3 x10*3/uL    nRBC 0.0 0.0 - 0.0 /100 WBCs    RBC 4.24 4.00 - 5.20 x10*6/uL    Hemoglobin 14.1 12.0 - 16.0 g/dL    Hematocrit 41.7 36.0 - 46.0 %    MCV 98 80 - 100 fL    MCH 33.3 26.0 - 34.0 pg    MCHC 33.8 32.0 - 36.0 g/dL    RDW 12.2 11.5 - 14.5 %    Platelets 242 150 - 450 x10*3/uL    Neutrophils % 70.7 40.0 - 80.0 %    Immature Granulocytes %, Automated 0.5 0.0 - 0.9 %    Lymphocytes % 19.8 13.0 - 44.0 %    Monocytes % 8.1 2.0 - 10.0 %    Eosinophils % 0.6 0.0 - 6.0 %    Basophils % 0.3 0.0 - 2.0 %    Neutrophils Absolute 10.93 (H) 1.60 - 5.50 x10*3/uL    Immature Granulocytes Absolute, Automated 0.08 0.00 - 0.50 x10*3/uL    Lymphocytes Absolute 3.06 (H) 0.80 - 3.00 x10*3/uL    Monocytes Absolute 1.25 (H) 0.05 - 0.80 x10*3/uL    Eosinophils Absolute 0.09 0.00 - 0.40 x10*3/uL    Basophils Absolute 0.04 0.00 - 0.10 x10*3/uL   Comprehensive metabolic panel   Result Value Ref Range    Glucose 109 (H) 65 - 99 mg/dL    Sodium 136 133 - 145 mmol/L    Potassium 4.1 3.4 - 5.1 mmol/L    Chloride 105 97 - 107 mmol/L    Bicarbonate 21 (L) 24 - 31 mmol/L    Urea Nitrogen 12 8 - 25 mg/dL    Creatinine 1.00 0.40 - 1.60 mg/dL    eGFR 57  (L) >60 mL/min/1.73m*2    Calcium 9.8 8.5 - 10.4 mg/dL    Albumin 3.5 3.5 - 5.0 g/dL    Alkaline Phosphatase 99 35 - 125 U/L    Total Protein 7.6 5.9 - 7.9 g/dL    AST 30 5 - 40 U/L    Bilirubin, Total 0.6 0.1 - 1.2 mg/dL    ALT 18 5 - 40 U/L    Anion Gap 10 <=19 mmol/L   Troponin T, High Sensitivity   Result Value Ref Range    Troponin T, High Sensitivity 11 <=15 ng/L   Protime-INR   Result Value Ref Range    Protime 11.4 9.3 - 12.7 seconds    INR 1.1 0.9 - 1.2   APTT   Result Value Ref Range    aPTT 28.9 22.0 - 32.5 seconds   Urinalysis with Reflex Microscopic and Culture   Result Value Ref Range    Color, Urine Yellow Light-Yellow, Yellow, Dark-Yellow    Appearance, Urine Clear Clear    Specific Gravity, Urine 1.018 1.005 - 1.035    pH, Urine 6.5 5.0, 5.5, 6.0, 6.5, 7.0, 7.5, 8.0    Protein, Urine NEGATIVE NEGATIVE, 10 (TRACE), 20 (TRACE) mg/dL    Glucose, Urine Normal Normal mg/dL    Blood, Urine NEGATIVE NEGATIVE    Ketones, Urine NEGATIVE NEGATIVE mg/dL    Bilirubin, Urine NEGATIVE NEGATIVE    Urobilinogen, Urine 2 (1+) (A) Normal mg/dL    Nitrite, Urine 2+ (A) NEGATIVE    Leukocyte Esterase, Urine 25 Collin/µL (A) NEGATIVE   Microscopic Only, Urine   Result Value Ref Range    WBC, Urine 1-5 1-5, NONE /HPF    RBC, Urine 1-2 NONE, 1-2, 3-5 /HPF    Squamous Epithelial Cells, Urine 1-9 (SPARSE) Reference range not established. /HPF    Mucus, Urine FEW Reference range not established. /LPF   Sars-CoV-2 PCR, Symptomatic   Result Value Ref Range    Coronavirus 2019, PCR Not Detected Not Detected   Influenza A, and B PCR   Result Value Ref Range    Flu A Result Not Detected Not Detected    Flu B Result Not Detected Not Detected   RSV PCR   Result Value Ref Range    RSV PCR Not Detected Not Detected   Comprehensive metabolic panel   Result Value Ref Range    Glucose 111 (H) 65 - 99 mg/dL    Sodium 136 133 - 145 mmol/L    Potassium 3.7 3.4 - 5.1 mmol/L    Chloride 109 (H) 97 - 107 mmol/L    Bicarbonate 18 (L) 24 - 31  mmol/L    Urea Nitrogen 11 8 - 25 mg/dL    Creatinine 0.80 0.40 - 1.60 mg/dL    eGFR 75 >60 mL/min/1.73m*2    Calcium 9.1 8.5 - 10.4 mg/dL    Albumin 2.8 (L) 3.5 - 5.0 g/dL    Alkaline Phosphatase 89 35 - 125 U/L    Total Protein 6.8 5.9 - 7.9 g/dL    AST 26 5 - 40 U/L    Bilirubin, Total 0.5 0.1 - 1.2 mg/dL    ALT 15 5 - 40 U/L    Anion Gap 9 <=19 mmol/L     Assessment/Plan   AMS  -Baseline unknown   -CT brain shows no acute process  -Neuro to see  -NPO pending speech eval    UTI  -UA with mild leukocytes  -IV Rocephin pending culture      Seizures  -Continue meds    Bipolar disorder/Schizoaffective disorder  -Continue meds    Alzheimer's dementia     Hypertension   -Continue meds     Plan  Pt is a long-term resident of [facility]. Back to [facility] on discharge.     Dona Flores, APRN-CNP

## 2023-12-20 LAB
ANION GAP SERPL CALC-SCNC: 10 MMOL/L
BUN SERPL-MCNC: 10 MG/DL (ref 8–25)
CALCIUM SERPL-MCNC: 9.1 MG/DL (ref 8.5–10.4)
CHLORIDE SERPL-SCNC: 109 MMOL/L (ref 97–107)
CO2 SERPL-SCNC: 17 MMOL/L (ref 24–31)
CREAT SERPL-MCNC: 0.8 MG/DL (ref 0.4–1.6)
ERYTHROCYTE [DISTWIDTH] IN BLOOD BY AUTOMATED COUNT: 12.3 % (ref 11.5–14.5)
GFR SERPL CREATININE-BSD FRML MDRD: 75 ML/MIN/1.73M*2
GLUCOSE SERPL-MCNC: 102 MG/DL (ref 65–99)
HCT VFR BLD AUTO: 39.7 % (ref 36–46)
HGB BLD-MCNC: 12.8 G/DL (ref 12–16)
MCH RBC QN AUTO: 32.7 PG (ref 26–34)
MCHC RBC AUTO-ENTMCNC: 32.2 G/DL (ref 32–36)
MCV RBC AUTO: 101 FL (ref 80–100)
NRBC BLD-RTO: 0 /100 WBCS (ref 0–0)
PLATELET # BLD AUTO: 201 X10*3/UL (ref 150–450)
POTASSIUM SERPL-SCNC: 3.7 MMOL/L (ref 3.4–5.1)
RBC # BLD AUTO: 3.92 X10*6/UL (ref 4–5.2)
SODIUM SERPL-SCNC: 136 MMOL/L (ref 133–145)
WBC # BLD AUTO: 12.2 X10*3/UL (ref 4.4–11.3)

## 2023-12-20 PROCEDURE — 85027 COMPLETE CBC AUTOMATED: CPT | Performed by: NURSE PRACTITIONER

## 2023-12-20 PROCEDURE — 96372 THER/PROPH/DIAG INJ SC/IM: CPT | Performed by: EMERGENCY MEDICINE

## 2023-12-20 PROCEDURE — 9420000001 HC RT PATIENT EDUCATION 5 MIN

## 2023-12-20 PROCEDURE — 2060000001 HC INTERMEDIATE ICU ROOM DAILY

## 2023-12-20 PROCEDURE — 2500000004 HC RX 250 GENERAL PHARMACY W/ HCPCS (ALT 636 FOR OP/ED): Performed by: NURSE PRACTITIONER

## 2023-12-20 PROCEDURE — 2500000001 HC RX 250 WO HCPCS SELF ADMINISTERED DRUGS (ALT 637 FOR MEDICARE OP): Performed by: EMERGENCY MEDICINE

## 2023-12-20 PROCEDURE — 2500000002 HC RX 250 W HCPCS SELF ADMINISTERED DRUGS (ALT 637 FOR MEDICARE OP, ALT 636 FOR OP/ED): Performed by: EMERGENCY MEDICINE

## 2023-12-20 PROCEDURE — 80048 BASIC METABOLIC PNL TOTAL CA: CPT | Performed by: NURSE PRACTITIONER

## 2023-12-20 PROCEDURE — 36415 COLL VENOUS BLD VENIPUNCTURE: CPT | Performed by: NURSE PRACTITIONER

## 2023-12-20 PROCEDURE — 94640 AIRWAY INHALATION TREATMENT: CPT

## 2023-12-20 PROCEDURE — 99232 SBSQ HOSP IP/OBS MODERATE 35: CPT | Performed by: NURSE PRACTITIONER

## 2023-12-20 PROCEDURE — 2500000001 HC RX 250 WO HCPCS SELF ADMINISTERED DRUGS (ALT 637 FOR MEDICARE OP): Performed by: NURSE PRACTITIONER

## 2023-12-20 PROCEDURE — 2500000004 HC RX 250 GENERAL PHARMACY W/ HCPCS (ALT 636 FOR OP/ED): Performed by: EMERGENCY MEDICINE

## 2023-12-20 RX ORDER — AZITHROMYCIN 250 MG/1
250 TABLET, FILM COATED ORAL DAILY
Status: DISCONTINUED | OUTPATIENT
Start: 2023-12-21 | End: 2023-12-21 | Stop reason: HOSPADM

## 2023-12-20 RX ORDER — AZITHROMYCIN 500 MG/1
500 TABLET, FILM COATED ORAL
Status: COMPLETED | OUTPATIENT
Start: 2023-12-20 | End: 2023-12-20

## 2023-12-20 RX ORDER — GUAIFENESIN 100 MG/5ML
200 SOLUTION ORAL EVERY 4 HOURS PRN
Status: DISCONTINUED | OUTPATIENT
Start: 2023-12-20 | End: 2023-12-21 | Stop reason: HOSPADM

## 2023-12-20 RX ORDER — SODIUM BICARBONATE 650 MG/1
650 TABLET ORAL 2 TIMES DAILY
Status: DISCONTINUED | OUTPATIENT
Start: 2023-12-20 | End: 2023-12-21 | Stop reason: HOSPADM

## 2023-12-20 RX ADMIN — IPRATROPIUM BROMIDE AND ALBUTEROL SULFATE 3 ML: 2.5; .5 SOLUTION RESPIRATORY (INHALATION) at 08:38

## 2023-12-20 RX ADMIN — SODIUM BICARBONATE 650 MG TABLET 650 MG: at 10:05

## 2023-12-20 RX ADMIN — GUAIFENESIN 200 MG: 200 SOLUTION ORAL at 17:03

## 2023-12-20 RX ADMIN — TOPIRAMATE 50 MG: 25 TABLET, FILM COATED ORAL at 21:08

## 2023-12-20 RX ADMIN — CEFTRIAXONE SODIUM 1 G: 1 INJECTION, SOLUTION INTRAVENOUS at 11:29

## 2023-12-20 RX ADMIN — SENNOSIDES 17.2 MG: 8.6 TABLET, FILM COATED ORAL at 08:29

## 2023-12-20 RX ADMIN — BUSPIRONE HYDROCHLORIDE 5 MG: 5 TABLET ORAL at 21:08

## 2023-12-20 RX ADMIN — ATORVASTATIN CALCIUM 20 MG: 20 TABLET, FILM COATED ORAL at 21:07

## 2023-12-20 RX ADMIN — BISOPROLOL FUMARATE 2.5 MG: 5 TABLET, FILM COATED ORAL at 21:07

## 2023-12-20 RX ADMIN — IPRATROPIUM BROMIDE AND ALBUTEROL SULFATE 3 ML: 2.5; .5 SOLUTION RESPIRATORY (INHALATION) at 13:16

## 2023-12-20 RX ADMIN — SODIUM BICARBONATE 650 MG TABLET 650 MG: at 21:08

## 2023-12-20 RX ADMIN — DEXTROSE MONOHYDRATE AND SODIUM CHLORIDE 50 ML/HR: 5; .9 INJECTION, SOLUTION INTRAVENOUS at 00:21

## 2023-12-20 RX ADMIN — BUSPIRONE HYDROCHLORIDE 5 MG: 5 TABLET ORAL at 15:02

## 2023-12-20 RX ADMIN — IPRATROPIUM BROMIDE AND ALBUTEROL SULFATE 3 ML: 2.5; .5 SOLUTION RESPIRATORY (INHALATION) at 19:10

## 2023-12-20 RX ADMIN — VENLAFAXINE HYDROCHLORIDE 225 MG: 75 CAPSULE, EXTENDED RELEASE ORAL at 08:28

## 2023-12-20 RX ADMIN — LEVOTHYROXINE SODIUM 112 MCG: 0.11 TABLET ORAL at 06:04

## 2023-12-20 RX ADMIN — ASPIRIN 81 MG: 81 TABLET, COATED ORAL at 08:28

## 2023-12-20 RX ADMIN — TOPIRAMATE 50 MG: 25 TABLET, FILM COATED ORAL at 08:28

## 2023-12-20 RX ADMIN — ENOXAPARIN SODIUM 40 MG: 40 INJECTION SUBCUTANEOUS at 06:04

## 2023-12-20 RX ADMIN — BUSPIRONE HYDROCHLORIDE 5 MG: 5 TABLET ORAL at 08:28

## 2023-12-20 RX ADMIN — LAMOTRIGINE 100 MG: 100 TABLET ORAL at 21:07

## 2023-12-20 RX ADMIN — AZITHROMYCIN DIHYDRATE 500 MG: 500 TABLET ORAL at 17:03

## 2023-12-20 RX ADMIN — LAMOTRIGINE 100 MG: 100 TABLET ORAL at 08:28

## 2023-12-20 RX ADMIN — ARIPIPRAZOLE 5 MG: 5 TABLET ORAL at 08:28

## 2023-12-20 RX ADMIN — TRAZODONE HYDROCHLORIDE 25 MG: 50 TABLET ORAL at 08:28

## 2023-12-20 RX ADMIN — ARIPIPRAZOLE 2 MG: 2 TABLET ORAL at 08:37

## 2023-12-20 RX ADMIN — BISOPROLOL FUMARATE 2.5 MG: 5 TABLET, FILM COATED ORAL at 08:28

## 2023-12-20 RX ADMIN — ACETAMINOPHEN 650 MG: 325 TABLET ORAL at 15:02

## 2023-12-20 RX ADMIN — POTASSIUM CHLORIDE 20 MEQ: 1500 TABLET, EXTENDED RELEASE ORAL at 08:28

## 2023-12-20 ASSESSMENT — PAIN SCALES - GENERAL
PAINLEVEL_OUTOF10: 0 - NO PAIN
PAINLEVEL_OUTOF10: 0 - NO PAIN

## 2023-12-20 ASSESSMENT — COGNITIVE AND FUNCTIONAL STATUS - GENERAL
MOVING TO AND FROM BED TO CHAIR: A LOT
DAILY ACTIVITIY SCORE: 8
TOILETING: TOTAL
STANDING UP FROM CHAIR USING ARMS: A LOT
WALKING IN HOSPITAL ROOM: A LOT
MOVING TO AND FROM BED TO CHAIR: A LOT
HELP NEEDED FOR BATHING: TOTAL
PERSONAL GROOMING: TOTAL
DAILY ACTIVITIY SCORE: 7
CLIMB 3 TO 5 STEPS WITH RAILING: A LOT
WALKING IN HOSPITAL ROOM: A LOT
DRESSING REGULAR UPPER BODY CLOTHING: TOTAL
MOVING FROM LYING ON BACK TO SITTING ON SIDE OF FLAT BED WITH BEDRAILS: A LOT
TURNING FROM BACK TO SIDE WHILE IN FLAT BAD: A LOT
DRESSING REGULAR UPPER BODY CLOTHING: TOTAL
CLIMB 3 TO 5 STEPS WITH RAILING: A LOT
STANDING UP FROM CHAIR USING ARMS: A LOT
PERSONAL GROOMING: A LOT
DRESSING REGULAR LOWER BODY CLOTHING: TOTAL
EATING MEALS: A LOT
MOBILITY SCORE: 12
EATING MEALS: A LOT
TOILETING: TOTAL
HELP NEEDED FOR BATHING: TOTAL
DRESSING REGULAR LOWER BODY CLOTHING: TOTAL
MOVING FROM LYING ON BACK TO SITTING ON SIDE OF FLAT BED WITH BEDRAILS: A LOT
TURNING FROM BACK TO SIDE WHILE IN FLAT BAD: A LOT
MOBILITY SCORE: 12

## 2023-12-20 ASSESSMENT — PAIN - FUNCTIONAL ASSESSMENT: PAIN_FUNCTIONAL_ASSESSMENT: 0-10

## 2023-12-20 NOTE — CARE PLAN
Problem: Pain  Goal: My pain/discomfort is manageable  Outcome: Progressing   The patient's goals for the shift include      The clinical goals for the shift include safety, seizure precautions    Over the shift, the patient did not make progress toward the following goals. Barriers to progression include . Recommendations to address these barriers include .

## 2023-12-20 NOTE — PROGRESS NOTES
Mariella Cuello is a 78 y.o. female on day 1 of admission presenting with Altered mental status, unspecified altered mental status type.      Subjective   Mentation greatly improved this am. She is alert and responsive. Smiling. A&O x2-3.       Objective     Last Recorded Vitals  /51 (BP Location: Right arm)   Pulse 91   Temp 36.9 °C (98.4 °F) (Temporal)   Resp 16   Wt 91.5 kg (201 lb 11.5 oz)   SpO2 98%   Intake/Output last 3 Shifts:    Intake/Output Summary (Last 24 hours) at 12/20/2023 0949  Last data filed at 12/20/2023 0933  Gross per 24 hour   Intake 1874.17 ml   Output 550 ml   Net 1324.17 ml       Admission Weight  Weight: 91.5 kg (201 lb 11.5 oz) (12/18/23 2138)    Daily Weight  12/19/23 : 91.5 kg (201 lb 11.5 oz)      Physical Exam  Constitutional:       Comments: Aphasic   HENT:      Head: Normocephalic and atraumatic.      Nose: Nose normal.      Mouth/Throat:      Mouth: Mucous membranes are moist.      Pharynx: Oropharynx is clear.   Eyes:      Extraocular Movements: Extraocular movements intact.      Pupils: Pupils are equal, round, and reactive to light.   Cardiovascular:      Rate and Rhythm: Normal rate and regular rhythm.      Pulses: Normal pulses.   Pulmonary:      Effort: Pulmonary effort is normal.      Breath sounds: Normal breath sounds.   Abdominal:      General: Abdomen is flat. Bowel sounds are normal.      Palpations: Abdomen is soft.   Musculoskeletal:         General: Normal range of motion.   Skin:     General: Skin is warm and dry.      Capillary Refill: Capillary refill takes less than 2 seconds.   Neurological:      General: No focal deficit present.      Mental Status: She is alert and oriented to person, place, and time.   Psychiatric:         Mood and Affect: Mood normal.       Results for orders placed or performed during the hospital encounter of 12/18/23 (from the past 24 hour(s))   POCT GLUCOSE   Result Value Ref Range    POCT Glucose 130 (H) 74 - 99 mg/dL   Basic  metabolic panel   Result Value Ref Range    Glucose 102 (H) 65 - 99 mg/dL    Sodium 136 133 - 145 mmol/L    Potassium 3.7 3.4 - 5.1 mmol/L    Chloride 109 (H) 97 - 107 mmol/L    Bicarbonate 17 (L) 24 - 31 mmol/L    Urea Nitrogen 10 8 - 25 mg/dL    Creatinine 0.80 0.40 - 1.60 mg/dL    eGFR 75 >60 mL/min/1.73m*2    Calcium 9.1 8.5 - 10.4 mg/dL    Anion Gap 10 <=19 mmol/L   CBC   Result Value Ref Range    WBC 12.2 (H) 4.4 - 11.3 x10*3/uL    nRBC 0.0 0.0 - 0.0 /100 WBCs    RBC 3.92 (L) 4.00 - 5.20 x10*6/uL    Hemoglobin 12.8 12.0 - 16.0 g/dL    Hematocrit 39.7 36.0 - 46.0 %     (H) 80 - 100 fL    MCH 32.7 26.0 - 34.0 pg    MCHC 32.2 32.0 - 36.0 g/dL    RDW 12.3 11.5 - 14.5 %    Platelets 201 150 - 450 x10*3/uL     Assessment/Plan   AMS  -Likely secondary to UTI  -Improved. Pt back to baseline    -CT brain shows no acute process  -Neuro follows  -NPO pending speech eval    UTI  -IV Rocephin pending culture      Seizures  -Continue meds    Bipolar disorder/Schizoaffective disorder  -Continue meds    Alzheimer's dementia     Hypertension   -Continue meds     Plan  Pt is a long-term resident of [facility]. Back to [facility] on discharge. Anticipate discharge tomorrow    Dona Flores, APRN-CNP

## 2023-12-20 NOTE — PROGRESS NOTES
Per provider in rounds pt will be medically cleared to discharge back to [facility] tomorrow.    TCSW sent [facility] a message in [facility] making them aware.

## 2023-12-21 VITALS
TEMPERATURE: 97 F | WEIGHT: 217.81 LBS | DIASTOLIC BLOOD PRESSURE: 67 MMHG | HEIGHT: 64 IN | BODY MASS INDEX: 37.19 KG/M2 | HEART RATE: 86 BPM | RESPIRATION RATE: 18 BRPM | OXYGEN SATURATION: 96 % | SYSTOLIC BLOOD PRESSURE: 124 MMHG

## 2023-12-21 LAB
ANION GAP SERPL CALC-SCNC: 7 MMOL/L
BACTERIA UR CULT: ABNORMAL
BUN SERPL-MCNC: 8 MG/DL (ref 8–25)
CALCIUM SERPL-MCNC: 8.9 MG/DL (ref 8.5–10.4)
CHLORIDE SERPL-SCNC: 112 MMOL/L (ref 97–107)
CO2 SERPL-SCNC: 20 MMOL/L (ref 24–31)
CREAT SERPL-MCNC: 0.8 MG/DL (ref 0.4–1.6)
ERYTHROCYTE [DISTWIDTH] IN BLOOD BY AUTOMATED COUNT: 12.3 % (ref 11.5–14.5)
GFR SERPL CREATININE-BSD FRML MDRD: 75 ML/MIN/1.73M*2
GLUCOSE SERPL-MCNC: 96 MG/DL (ref 65–99)
HCT VFR BLD AUTO: 35.7 % (ref 36–46)
HGB BLD-MCNC: 11.5 G/DL (ref 12–16)
MCH RBC QN AUTO: 32.4 PG (ref 26–34)
MCHC RBC AUTO-ENTMCNC: 32.2 G/DL (ref 32–36)
MCV RBC AUTO: 101 FL (ref 80–100)
NRBC BLD-RTO: 0 /100 WBCS (ref 0–0)
PLATELET # BLD AUTO: 210 X10*3/UL (ref 150–450)
POTASSIUM SERPL-SCNC: 3.8 MMOL/L (ref 3.4–5.1)
RBC # BLD AUTO: 3.55 X10*6/UL (ref 4–5.2)
SODIUM SERPL-SCNC: 139 MMOL/L (ref 133–145)
WBC # BLD AUTO: 9.3 X10*3/UL (ref 4.4–11.3)

## 2023-12-21 PROCEDURE — 2500000002 HC RX 250 W HCPCS SELF ADMINISTERED DRUGS (ALT 637 FOR MEDICARE OP, ALT 636 FOR OP/ED): Performed by: EMERGENCY MEDICINE

## 2023-12-21 PROCEDURE — 99232 SBSQ HOSP IP/OBS MODERATE 35: CPT | Performed by: NURSE PRACTITIONER

## 2023-12-21 PROCEDURE — 36415 COLL VENOUS BLD VENIPUNCTURE: CPT | Performed by: NURSE PRACTITIONER

## 2023-12-21 PROCEDURE — 96372 THER/PROPH/DIAG INJ SC/IM: CPT | Performed by: EMERGENCY MEDICINE

## 2023-12-21 PROCEDURE — 94640 AIRWAY INHALATION TREATMENT: CPT

## 2023-12-21 PROCEDURE — 2500000004 HC RX 250 GENERAL PHARMACY W/ HCPCS (ALT 636 FOR OP/ED): Performed by: NURSE PRACTITIONER

## 2023-12-21 PROCEDURE — 2500000001 HC RX 250 WO HCPCS SELF ADMINISTERED DRUGS (ALT 637 FOR MEDICARE OP): Performed by: EMERGENCY MEDICINE

## 2023-12-21 PROCEDURE — 80048 BASIC METABOLIC PNL TOTAL CA: CPT | Performed by: NURSE PRACTITIONER

## 2023-12-21 PROCEDURE — 2500000004 HC RX 250 GENERAL PHARMACY W/ HCPCS (ALT 636 FOR OP/ED): Performed by: EMERGENCY MEDICINE

## 2023-12-21 PROCEDURE — 2500000002 HC RX 250 W HCPCS SELF ADMINISTERED DRUGS (ALT 637 FOR MEDICARE OP, ALT 636 FOR OP/ED): Performed by: NURSE PRACTITIONER

## 2023-12-21 PROCEDURE — 9420000001 HC RT PATIENT EDUCATION 5 MIN

## 2023-12-21 PROCEDURE — 85027 COMPLETE CBC AUTOMATED: CPT | Performed by: NURSE PRACTITIONER

## 2023-12-21 RX ORDER — AZITHROMYCIN 250 MG/1
250 TABLET, FILM COATED ORAL DAILY
Qty: 6 TABLET | Refills: 0
Start: 2023-12-22 | End: 2023-12-25

## 2023-12-21 RX ORDER — NITROFURANTOIN 25; 75 MG/1; MG/1
100 CAPSULE ORAL 2 TIMES DAILY
Start: 2023-12-21 | End: 2023-12-24

## 2023-12-21 RX ADMIN — ONDANSETRON 4 MG: 2 INJECTION INTRAMUSCULAR; INTRAVENOUS at 07:58

## 2023-12-21 RX ADMIN — TOPIRAMATE 50 MG: 25 TABLET, FILM COATED ORAL at 09:24

## 2023-12-21 RX ADMIN — VENLAFAXINE HYDROCHLORIDE 225 MG: 75 CAPSULE, EXTENDED RELEASE ORAL at 09:23

## 2023-12-21 RX ADMIN — POTASSIUM CHLORIDE 20 MEQ: 1500 TABLET, EXTENDED RELEASE ORAL at 09:24

## 2023-12-21 RX ADMIN — SENNOSIDES 17.2 MG: 8.6 TABLET, FILM COATED ORAL at 09:24

## 2023-12-21 RX ADMIN — ARIPIPRAZOLE 5 MG: 5 TABLET ORAL at 09:23

## 2023-12-21 RX ADMIN — TRAZODONE HYDROCHLORIDE 25 MG: 50 TABLET ORAL at 09:24

## 2023-12-21 RX ADMIN — BUSPIRONE HYDROCHLORIDE 5 MG: 5 TABLET ORAL at 09:24

## 2023-12-21 RX ADMIN — LEVOTHYROXINE SODIUM 112 MCG: 0.11 TABLET ORAL at 05:26

## 2023-12-21 RX ADMIN — CEFTRIAXONE SODIUM 1 G: 1 INJECTION, SOLUTION INTRAVENOUS at 09:23

## 2023-12-21 RX ADMIN — ASPIRIN 81 MG: 81 TABLET, COATED ORAL at 09:24

## 2023-12-21 RX ADMIN — IPRATROPIUM BROMIDE AND ALBUTEROL SULFATE 3 ML: 2.5; .5 SOLUTION RESPIRATORY (INHALATION) at 07:19

## 2023-12-21 RX ADMIN — LAMOTRIGINE 100 MG: 100 TABLET ORAL at 09:24

## 2023-12-21 RX ADMIN — AZITHROMYCIN DIHYDRATE 250 MG: 250 TABLET, FILM COATED ORAL at 09:25

## 2023-12-21 RX ADMIN — BISOPROLOL FUMARATE 2.5 MG: 5 TABLET, FILM COATED ORAL at 09:24

## 2023-12-21 RX ADMIN — ENOXAPARIN SODIUM 40 MG: 40 INJECTION SUBCUTANEOUS at 05:26

## 2023-12-21 RX ADMIN — ARIPIPRAZOLE 2 MG: 2 TABLET ORAL at 09:25

## 2023-12-21 RX ADMIN — SODIUM BICARBONATE 650 MG TABLET 650 MG: at 09:24

## 2023-12-21 ASSESSMENT — PAIN SCALES - GENERAL: PAINLEVEL_OUTOF10: 0 - NO PAIN

## 2023-12-21 NOTE — DISCHARGE SUMMARY
Discharge Diagnosis  Altered mental status, unspecified altered mental status type    Issues Requiring Follow-Up      Discharge Meds     Your medication list        START taking these medications        Instructions Last Dose Given Next Dose Due   azithromycin 250 mg tablet  Commonly known as: Zithromax  Start taking on: December 22, 2023      Take 1 tablet (250 mg) by mouth once daily for 3 doses. Do not start before December 22, 2023.       nitrofurantoin (macrocrystal-monohydrate) 100 mg capsule  Commonly known as: Macrobid      Take 1 capsule (100 mg) by mouth 2 times a day for 3 days.              CONTINUE taking these medications        Instructions Last Dose Given Next Dose Due   acetaminophen 650 mg suppository  Commonly known as: Tylenol           acetaminophen 325 mg tablet  Commonly known as: Tylenol           albuterol sulfate 90 mcg/actuation aero powdr breath act w/sensor inhaler  Commonly known as: Proair Digihaler           alum-mag hydroxide-simeth 400-400-40 mg/5 mL suspension  Commonly known as: Maalox MAX           ARIPiprazole 5 mg tablet  Commonly known as: Abilify           ARIPiprazole 2 mg tablet  Commonly known as: Abilify           aspirin 81 mg EC tablet           atorvastatin 20 mg tablet  Commonly known as: Lipitor           bisacodyl 10 mg suppository  Commonly known as: Dulcolax           bisoprolol 5 mg tablet  Commonly known as: Zebeta           busPIRone 5 mg tablet  Commonly known as: Buspar           cholecalciferol 5,000 Units tablet  Commonly known as: Vitamin D-3           diclofenac epolamine 1.3 % patch           docusate sodium 100 mg capsule  Commonly known as: Colace           furosemide 20 mg tablet  Commonly known as: Lasix           guaiFENesin 100 mg/5 mL syrup  Commonly known as: Robitussin           lamoTRIgine 100 mg tablet  Commonly known as: LaMICtal           levothyroxine 100 mcg tablet  Commonly known as: Synthroid, Levoxyl           loperamide 2 mg  tablet  Commonly known as: Imodium A-D           magnesium hydroxide 2,400 mg/10 mL suspension suspension  Commonly known as: Milk of Magnesia           melatonin 3 mg capsule           ondansetron 4 mg tablet  Commonly known as: Zofran           potassium chloride CR 20 mEq ER tablet  Commonly known as: Klor-Con M20           sennosides 8.6 mg tablet  Commonly known as: Senokot           sodium phosphates 19-7 gram/118 mL enema enema  Commonly known as: Fleets           topiramate 50 mg tablet  Commonly known as: Topamax           traMADol 50 mg tablet  Commonly known as: Ultram      Take 1 tablet (50 mg) by mouth 2 times a day as needed for severe pain (7 - 10).       traZODone 50 mg tablet  Commonly known as: Desyrel           venlafaxine XR 75 mg 24 hr capsule  Commonly known as: Effexor-XR           Voltaren 1 % gel gel  Generic drug: diclofenac sodium                     Where to Get Your Medications        Information about where to get these medications is not yet available    Ask your nurse or doctor about these medications  azithromycin 250 mg tablet  nitrofurantoin (macrocrystal-monohydrate) 100 mg capsule         Test Results Pending At Discharge  Pending Labs       Order Current Status    Extra Urine Gray Tube Collected (12/18/23 4325)    Urinalysis with Reflex Microscopic and Culture In process    Respiratory Culture/Smear Preliminary result    Urine Culture Preliminary result            Hospital Course   Admitted for AMS, secondary to UTI. Treated wit IV Abx. Was evaluated by Neuro. Neuro workup negative for stroke or acute process. Mentation quickly improved with Abx. To complete oral course on discharge. Medically stable for discharge back to [facility].       Pertinent Physical Exam At Time of Discharge  Physical Exam  HENT:      Head: Normocephalic and atraumatic.      Nose: Nose normal.      Mouth/Throat:      Mouth: Mucous membranes are moist.      Pharynx: Oropharynx is clear.   Eyes:      Extraocular  Movements: Extraocular movements intact.      Pupils: Pupils are equal, round, and reactive to light.   Cardiovascular:      Rate and Rhythm: Normal rate and regular rhythm.      Pulses: Normal pulses.   Pulmonary:      Effort: Pulmonary effort is normal.      Breath sounds: Normal breath sounds.   Abdominal:      General: Abdomen is flat. Bowel sounds are normal.      Palpations: Abdomen is soft.   Musculoskeletal:         General: Normal range of motion.   Skin:     General: Skin is warm and dry.      Capillary Refill: Capillary refill takes less than 2 seconds.   Neurological:      General: No focal deficit present.      Mental Status: She is oriented to person, place, and time.   Psychiatric:         Mood and Affect: Mood normal.         Outpatient Follow-Up  No future appointments.      Dona Flores, APRN-CNP

## 2023-12-21 NOTE — CARE PLAN
The clinical goals for the shift include seizure precautions    Over the shift, the patient did not make progress toward the following goals. Barriers to progression include . Recommendations to address these barriers include .

## 2023-12-21 NOTE — CARE PLAN
Problem: Respiratory  Goal: Increase self care and/or family involvement in next 24 hours  Outcome: Progressing

## 2023-12-21 NOTE — PROGRESS NOTES
Patient has a written discharge order.  She is LTC at [facility].  [facility] notified of discharge via [facility].  Patient can return to LTC at [facility] when arrangements are made.  RN TCC following.    7905  Transportation arranged for 2:00 pm.  Reached out to patient's son Casimiro [phone] to advise of discharge.  Son is agreeable to patient's discharge today to LTC [facility].  RN TCC following.     Ira Sherman, RN

## 2023-12-22 ENCOUNTER — NURSING HOME VISIT (OUTPATIENT)
Dept: POST ACUTE CARE | Facility: EXTERNAL LOCATION | Age: 78
End: 2023-12-22
Payer: MEDICARE

## 2023-12-22 VITALS
OXYGEN SATURATION: 97 % | BODY MASS INDEX: 34.31 KG/M2 | HEART RATE: 90 BPM | DIASTOLIC BLOOD PRESSURE: 78 MMHG | WEIGHT: 200 LBS | SYSTOLIC BLOOD PRESSURE: 143 MMHG | RESPIRATION RATE: 18 BRPM | TEMPERATURE: 97.2 F

## 2023-12-22 DIAGNOSIS — E03.9 ACQUIRED HYPOTHYROIDISM: ICD-10-CM

## 2023-12-22 DIAGNOSIS — I50.9 CHRONIC CONGESTIVE HEART FAILURE, UNSPECIFIED HEART FAILURE TYPE (MULTI): ICD-10-CM

## 2023-12-22 DIAGNOSIS — F31.32 BIPOLAR AFFECTIVE DISORDER, CURRENTLY DEPRESSED, MODERATE (MULTI): ICD-10-CM

## 2023-12-22 DIAGNOSIS — N30.00 ACUTE CYSTITIS WITHOUT HEMATURIA: Primary | ICD-10-CM

## 2023-12-22 LAB
BACTERIA SPEC RESP CULT: NORMAL
GRAM STN SPEC: NORMAL
GRAM STN SPEC: NORMAL

## 2023-12-22 PROCEDURE — 99309 SBSQ NF CARE MODERATE MDM 30: CPT | Performed by: PHYSICIAN ASSISTANT

## 2023-12-22 ASSESSMENT — ENCOUNTER SYMPTOMS
HEMATURIA: 0
NAUSEA: 0
COUGH: 1
DIARRHEA: 0
ABDOMINAL PAIN: 0
HEADACHES: 0
APPETITE CHANGE: 0
SHORTNESS OF BREATH: 0
TREMORS: 0
CONSTIPATION: 0
NERVOUS/ANXIOUS: 0
WEAKNESS: 0
VOMITING: 0
CONFUSION: 0
FREQUENCY: 0
CHILLS: 0
DIZZINESS: 0
DYSURIA: 0
FEVER: 0
WHEEZING: 0
FLANK PAIN: 0

## 2023-12-22 NOTE — LETTER
Patient: Mariella Cuello  : 1945    Encounter Date: 2023    No chief complaint on file.      Subjective  [id number] : Mariella Cuello is a 78 y.o. female LTC resident at [facility].   HPI  Pt readmitted after hospitalization for acute change in mental status with aphasia.   She was evaluated for stroke which was ruled out.   She was found to have a UTI and started on macrobid.  Her symptoms improved and mental status cleared with treatment.  She is seen while resting in bed. She is alert pleasant and conversant.  She states that she has had some cough.  She returned to us on course of Zithromax which I believe is for the cough.  Pt is a poor historian.   She states that she feels well and offers no new complaints.   She does not appear to be short of breath.  She has no lower leg edema noted.  She does take lasix daily.  No fevers, chills or hypoxia reported.    Review of Systems   Constitutional:  Negative for appetite change, chills and fever.   Respiratory:  Positive for cough. Negative for shortness of breath and wheezing.    Cardiovascular:  Negative for chest pain and leg swelling.   Gastrointestinal:  Negative for abdominal pain, constipation, diarrhea, nausea and vomiting.   Genitourinary:  Negative for dysuria, flank pain, frequency and hematuria.   Neurological:  Negative for dizziness, tremors, weakness and headaches.   Psychiatric/Behavioral:  Negative for confusion. The patient is not nervous/anxious.    All other systems reviewed and are negative.      Objective  /78   Pulse 90   Temp 36.2 °C (97.2 °F)   Resp 18   Wt 90.7 kg (200 lb)   SpO2 97%   BMI 34.31 kg/m²    Physical Exam  Constitutional:       General: She is not in acute distress.  Eyes:      Conjunctiva/sclera: Conjunctivae normal.      Pupils: Pupils are equal, round, and reactive to light.   Cardiovascular:      Rate and Rhythm: Normal rate and regular rhythm.      Heart sounds: No murmur heard.  Pulmonary:       "Effort: Pulmonary effort is normal.      Breath sounds: No wheezing, rhonchi or rales.   Abdominal:      General: Abdomen is flat. Bowel sounds are normal. There is no distension.      Palpations: Abdomen is soft. There is no mass.      Tenderness: There is no abdominal tenderness.   Musculoskeletal:         General: No swelling. Normal range of motion.   Skin:     General: Skin is warm and dry.      Findings: No rash.   Neurological:      General: No focal deficit present.      Mental Status: She is alert and oriented to person, place, and time. Mental status is at baseline.       No lab exists for component: \"CBC BMP\"  Assessment/Plan  Chronic congestive heart failure (CMS/HCC) I50.9        Continue lasix.  Stable.            Acquired hypothyroidism E03.9       Elevated TSH on routine labs.  Synthroid increased to 125mcg daily.  Recheck labs as ordered.           Bipolar affective disorder, currently depressed, moderate (CMS/HCC) F31.32       Continue current meds.  Pt follows with psych           Acute UTI:  complete course of antibiotics.         Time spent: 30 min in review of chart, labs and orders, consultation with pt and documentation.     Electronically Signed By: Ana Paula Paul PA-C   12/22/23 11:42 AM  "

## 2023-12-22 NOTE — PROGRESS NOTES
No chief complaint on file.      Subjective   [id number] : Mariella Cuello is a 78 y.o. female LTC resident at [facility].   HPI  Pt readmitted after hospitalization for acute change in mental status with aphasia.   She was evaluated for stroke which was ruled out.   She was found to have a UTI and started on macrobid.  Her symptoms improved and mental status cleared with treatment.  She is seen while resting in bed. She is alert pleasant and conversant.  She states that she has had some cough.  She returned to us on course of Zithromax which I believe is for the cough.  Pt is a poor historian.   She states that she feels well and offers no new complaints.   She does not appear to be short of breath.  She has no lower leg edema noted.  She does take lasix daily.  No fevers, chills or hypoxia reported.    Review of Systems   Constitutional:  Negative for appetite change, chills and fever.   Respiratory:  Positive for cough. Negative for shortness of breath and wheezing.    Cardiovascular:  Negative for chest pain and leg swelling.   Gastrointestinal:  Negative for abdominal pain, constipation, diarrhea, nausea and vomiting.   Genitourinary:  Negative for dysuria, flank pain, frequency and hematuria.   Neurological:  Negative for dizziness, tremors, weakness and headaches.   Psychiatric/Behavioral:  Negative for confusion. The patient is not nervous/anxious.    All other systems reviewed and are negative.      Objective   /78   Pulse 90   Temp 36.2 °C (97.2 °F)   Resp 18   Wt 90.7 kg (200 lb)   SpO2 97%   BMI 34.31 kg/m²    Physical Exam  Constitutional:       General: She is not in acute distress.  Eyes:      Conjunctiva/sclera: Conjunctivae normal.      Pupils: Pupils are equal, round, and reactive to light.   Cardiovascular:      Rate and Rhythm: Normal rate and regular rhythm.      Heart sounds: No murmur heard.  Pulmonary:      Effort: Pulmonary effort is normal.      Breath sounds: No wheezing,  "rhonchi or rales.   Abdominal:      General: Abdomen is flat. Bowel sounds are normal. There is no distension.      Palpations: Abdomen is soft. There is no mass.      Tenderness: There is no abdominal tenderness.   Musculoskeletal:         General: No swelling. Normal range of motion.   Skin:     General: Skin is warm and dry.      Findings: No rash.   Neurological:      General: No focal deficit present.      Mental Status: She is alert and oriented to person, place, and time. Mental status is at baseline.       No lab exists for component: \"CBC BMP\"  Assessment/Plan   Chronic congestive heart failure (CMS/HCC) I50.9        Continue lasix.  Stable.            Acquired hypothyroidism E03.9       Elevated TSH on routine labs.  Synthroid increased to 125mcg daily.  Recheck labs as ordered.           Bipolar affective disorder, currently depressed, moderate (CMS/HCC) F31.32       Continue current meds.  Pt follows with psych           Acute UTI:  complete course of antibiotics.         Time spent: 30 min in review of chart, labs and orders, consultation with pt and documentation.   "

## 2023-12-25 ENCOUNTER — LAB REQUISITION (OUTPATIENT)
Dept: LAB | Facility: HOSPITAL | Age: 78
End: 2023-12-25
Payer: MEDICARE

## 2023-12-25 DIAGNOSIS — J96.11 CHRONIC RESPIRATORY FAILURE WITH HYPOXIA (MULTI): ICD-10-CM

## 2023-12-25 LAB
ANION GAP SERPL CALC-SCNC: 8 MMOL/L
BUN SERPL-MCNC: 9 MG/DL (ref 8–25)
CALCIUM SERPL-MCNC: 8.6 MG/DL (ref 8.5–10.4)
CHLORIDE SERPL-SCNC: 112 MMOL/L (ref 97–107)
CO2 SERPL-SCNC: 23 MMOL/L (ref 24–31)
CREAT SERPL-MCNC: 0.8 MG/DL (ref 0.4–1.6)
ERYTHROCYTE [DISTWIDTH] IN BLOOD BY AUTOMATED COUNT: 12.5 % (ref 11.5–14.5)
GFR SERPL CREATININE-BSD FRML MDRD: 75 ML/MIN/1.73M*2
GLUCOSE SERPL-MCNC: 95 MG/DL (ref 65–99)
HCT VFR BLD AUTO: 38.2 % (ref 36–46)
HGB BLD-MCNC: 12.7 G/DL (ref 12–16)
MCH RBC QN AUTO: 33 PG (ref 26–34)
MCHC RBC AUTO-ENTMCNC: 33.2 G/DL (ref 32–36)
MCV RBC AUTO: 99 FL (ref 80–100)
NRBC BLD-RTO: 0 /100 WBCS (ref 0–0)
PLATELET # BLD AUTO: 336 X10*3/UL (ref 150–450)
POTASSIUM SERPL-SCNC: 3.8 MMOL/L (ref 3.4–5.1)
RBC # BLD AUTO: 3.85 X10*6/UL (ref 4–5.2)
SODIUM SERPL-SCNC: 143 MMOL/L (ref 133–145)
WBC # BLD AUTO: 7.8 X10*3/UL (ref 4.4–11.3)

## 2023-12-25 PROCEDURE — 85027 COMPLETE CBC AUTOMATED: CPT | Mod: OUT | Performed by: INTERNAL MEDICINE

## 2023-12-25 PROCEDURE — 80048 BASIC METABOLIC PNL TOTAL CA: CPT | Mod: OUT | Performed by: INTERNAL MEDICINE

## 2023-12-27 ENCOUNTER — NURSING HOME VISIT (OUTPATIENT)
Dept: POST ACUTE CARE | Facility: EXTERNAL LOCATION | Age: 78
End: 2023-12-27
Payer: MEDICARE

## 2023-12-27 DIAGNOSIS — F31.32 BIPOLAR AFFECTIVE DISORDER, CURRENTLY DEPRESSED, MODERATE (MULTI): ICD-10-CM

## 2023-12-27 DIAGNOSIS — N30.01 ACUTE CYSTITIS WITH HEMATURIA: Primary | ICD-10-CM

## 2023-12-27 DIAGNOSIS — E03.9 ACQUIRED HYPOTHYROIDISM: ICD-10-CM

## 2023-12-27 DIAGNOSIS — I50.9 CHRONIC CONGESTIVE HEART FAILURE, UNSPECIFIED HEART FAILURE TYPE (MULTI): ICD-10-CM

## 2023-12-27 PROCEDURE — 81001 URINALYSIS AUTO W/SCOPE: CPT | Mod: OUT | Performed by: INTERNAL MEDICINE

## 2023-12-27 PROCEDURE — 87086 URINE CULTURE/COLONY COUNT: CPT | Mod: OUT,TRILAB,WESLAB | Performed by: INTERNAL MEDICINE

## 2023-12-27 PROCEDURE — 99306 1ST NF CARE HIGH MDM 50: CPT | Performed by: INTERNAL MEDICINE

## 2023-12-27 ASSESSMENT — ENCOUNTER SYMPTOMS
ABDOMINAL PAIN: 0
FEVER: 0
SHORTNESS OF BREATH: 0

## 2023-12-27 NOTE — PROGRESS NOTES
HISTORY AND PHYSICAL    History of present illness:    Mariella Cuello is a 78 y.o. female admitted to SNF after hospitalization for acute change in mental status, stroke was excluded and she was treated for urinary tract infection with improvement in her symptoms.  She is completing a course of antibiotics.  She is laying in bed and seems vaguely weak although she has no specific complaints, she is talkative.  She just completed the antibiotics.  She cannot tell me if she has any dysuria or urinary tract symptoms at all.    Past Medical History:   Diagnosis Date    Alzheimer disease (CMS/[facility])     Anemia     Atherosclerotic heart disease of native coronary artery without angina pectoris 05/12/2021    Atherosclerosis of coronary artery without angina pectoris, unspecified vessel or lesion type, unspecified whether native or transplanted heart    Bipolar disorder (CMS/[facility])     Cardiomyopathy (CMS/[facility])     Chronic systolic (congestive) heart failure (CMS/[facility]) 05/12/2021    Chronic systolic heart failure, ACC/AHA stage C    Depression     Dysphagia     Gastritis     History of COVID-19     Hyperlipidemia     Hypertension     Hypothyroidism     Major depressive disorder, recurrent, unspecified (CMS/[facility]) 05/12/2021    Major depression, recurrent    Obstipation     Respiratory failure (CMS/[facility])     Schizoaffective disorder (CMS/[facility])     Spinal stenosis     Stroke (CMS/[facility])     Systolic heart failure (CMS/[facility])         Past Surgical History:   Procedure Laterality Date    CATARACT EXTRACTION  02/29/2016    Cataract Surgery    CORONARY ARTERY BYPASS GRAFT      MR HEAD ANGIO WO IV CONTRAST  01/22/2014    MR HEAD ANGIO WO IV CONTRAST [facility]    TONSILLECTOMY  02/29/2016    Tonsillectomy    TOTAL ABDOMINAL HYSTERECTOMY  02/29/2016    Total Abdominal Hysterectomy        No family history on file.    Social History     Socioeconomic History    Marital status:      Spouse name: Not on file    Number of children:  Not on file    Years of education: Not on file    Highest education level: Not on file   Occupational History    Not on file   Tobacco Use    Smoking status: Never     Passive exposure: Never    Smokeless tobacco: Not on file   Vaping Use    Vaping Use: Unknown   Substance and Sexual Activity    Alcohol use: Not on file    Drug use: Not on file    Sexual activity: Not on file   Other Topics Concern    Not on file   Social History Narrative    Not on file     Social Determinants of Health     Financial Resource Strain: Patient Unable To Answer (12/19/2023)    Overall Financial Resource Strain (CARDIA)     Difficulty of Paying Living Expenses: Patient unable to answer   Food Insecurity: Not on file   Transportation Needs: Patient Unable To Answer (12/19/2023)    PRAPARE - Transportation     Lack of Transportation (Medical): Patient unable to answer     Lack of Transportation (Non-Medical): Patient unable to answer   Physical Activity: Not on file   Stress: Not on file   Social Connections: Not on file   Intimate Partner Violence: Not on file   Housing Stability: Patient Unable To Answer (12/19/2023)    Housing Stability Vital Sign     Unable to Pay for Housing in the Last Year: Patient unable to answer     Number of Places Lived in the Last Year: 1     Unstable Housing in the Last Year: Patient unable to answer       Review of Systems   Constitutional:  Negative for fever.   Respiratory:  Negative for shortness of breath.    Cardiovascular:  Negative for chest pain.   Gastrointestinal:  Negative for abdominal pain.   All other systems reviewed and are negative.       Objective   Vital signs reviewed in [facility].    Physical Exam  Vitals reviewed.   Constitutional:       Appearance: Normal appearance.   Cardiovascular:      Rate and Rhythm: Normal rate and regular rhythm.      Heart sounds: No murmur heard.  Pulmonary:      Breath sounds: Normal breath sounds. No wheezing, rhonchi or rales.   Musculoskeletal:       Right lower leg: No edema.      Left lower leg: No edema.          Assessment/Plan   Diagnoses and all orders for this visit:  Acute cystitis with hematuria (Primary)  Chronic congestive heart failure, unspecified heart failure type (CMS/HCC)  Acquired hypothyroidism  Bipolar affective disorder, currently depressed, moderate (CMS/HCC)    UTI -would not typically reculture for test of cure but given her inability to explain symptoms I did ask the nurse to send another urine culture now that she has completed antibiotics.    Hypothyroidism -the levothyroxine was increased and the TSH will be monitored.    CHF - lasix, stable, euvolemic clinically    Psych - Same meds, psych does see her    The essential elements of the hospital History and Physical as well as the Discharge Summary have been confirmed to the best of my ability by means of interviewing the patient plus a review of the hospital records. Please note that this entry was created in a shared electronic medical record.     All available hospital and outpatient records have been reviewed. Will continue other current drug therapies as ordered on the continuation of care documents. Physical and Occupational Therapy will assess and treat per POC. Analgesia for identified pain symptoms. Continue the various medicines for bowel and bladder symptoms as well as the vitamins and supplements per hospital instructions. Will assess and provide local care to abnormalities of skin integrity. Drug to drug interaction data as noted by the pharmacy has been reviewed. The patient's condition warrants the continuation of these drugs as prescribed by the medical specialists. Discharge planning will be coordinated through the  department. I have reviewed any advanced directive documentation that is contained in the admission packet as directives indicating whether a surrogate decision maker has been identified.

## 2023-12-27 NOTE — LETTER
Patient: Mariella Cuello  : 1945    Encounter Date: 2023    HISTORY AND PHYSICAL    History of present illness:    Mariella Cuello is a 78 y.o. female admitted to SNF after hospitalization for acute change in mental status, stroke was excluded and she was treated for urinary tract infection with improvement in her symptoms.  She is completing a course of antibiotics.  She is laying in bed and seems vaguely weak although she has no specific complaints, she is talkative.  She just completed the antibiotics.  She cannot tell me if she has any dysuria or urinary tract symptoms at all.    Past Medical History:   Diagnosis Date   • Alzheimer disease (CMS/[facility])    • Anemia    • Atherosclerotic heart disease of native coronary artery without angina pectoris 2021    Atherosclerosis of coronary artery without angina pectoris, unspecified vessel or lesion type, unspecified whether native or transplanted heart   • Bipolar disorder (CMS/[facility])    • Cardiomyopathy (CMS/[facility])    • Chronic systolic (congestive) heart failure (CMS/[facility]) 2021    Chronic systolic heart failure, ACC/AHA stage C   • Depression    • Dysphagia    • Gastritis    • History of COVID-19    • Hyperlipidemia    • Hypertension    • Hypothyroidism    • Major depressive disorder, recurrent, unspecified (CMS/HCC) 2021    Major depression, recurrent   • Obstipation    • Respiratory failure (CMS/[facility])    • Schizoaffective disorder (CMS/HCC)    • Spinal stenosis    • Stroke (CMS/[facility])    • Systolic heart failure (CMS/HCC)         Past Surgical History:   Procedure Laterality Date   • CATARACT EXTRACTION  2016    Cataract Surgery   • CORONARY ARTERY BYPASS GRAFT     • MR HEAD ANGIO WO IV CONTRAST  2014    MR HEAD ANGIO WO IV CONTRAST [facility]   • TONSILLECTOMY  2016    Tonsillectomy   • TOTAL ABDOMINAL HYSTERECTOMY  2016    Total Abdominal Hysterectomy        No family history on file.    Social History      Socioeconomic History   • Marital status:      Spouse name: Not on file   • Number of children: Not on file   • Years of education: Not on file   • Highest education level: Not on file   Occupational History   • Not on file   Tobacco Use   • Smoking status: Never     Passive exposure: Never   • Smokeless tobacco: Not on file   Vaping Use   • Vaping Use: Unknown   Substance and Sexual Activity   • Alcohol use: Not on file   • Drug use: Not on file   • Sexual activity: Not on file   Other Topics Concern   • Not on file   Social History Narrative   • Not on file     Social Determinants of Health     Financial Resource Strain: Patient Unable To Answer (12/19/2023)    Overall Financial Resource Strain (CARDIA)    • Difficulty of Paying Living Expenses: Patient unable to answer   Food Insecurity: Not on file   Transportation Needs: Patient Unable To Answer (12/19/2023)    PRAPARE - Transportation    • Lack of Transportation (Medical): Patient unable to answer    • Lack of Transportation (Non-Medical): Patient unable to answer   Physical Activity: Not on file   Stress: Not on file   Social Connections: Not on file   Intimate Partner Violence: Not on file   Housing Stability: Patient Unable To Answer (12/19/2023)    Housing Stability Vital Sign    • Unable to Pay for Housing in the Last Year: Patient unable to answer    • Number of Places Lived in the Last Year: 1    • Unstable Housing in the Last Year: Patient unable to answer       Review of Systems   Constitutional:  Negative for fever.   Respiratory:  Negative for shortness of breath.    Cardiovascular:  Negative for chest pain.   Gastrointestinal:  Negative for abdominal pain.   All other systems reviewed and are negative.       Objective  Vital signs reviewed in [facility].    Physical Exam  Vitals reviewed.   Constitutional:       Appearance: Normal appearance.   Cardiovascular:      Rate and Rhythm: Normal rate and regular rhythm.      Heart sounds:  No murmur heard.  Pulmonary:      Breath sounds: Normal breath sounds. No wheezing, rhonchi or rales.   Musculoskeletal:      Right lower leg: No edema.      Left lower leg: No edema.          Assessment/Plan  Diagnoses and all orders for this visit:  Acute cystitis with hematuria (Primary)  Chronic congestive heart failure, unspecified heart failure type (CMS/HCC)  Acquired hypothyroidism  Bipolar affective disorder, currently depressed, moderate (CMS/HCC)    UTI -would not typically reculture for test of cure but given her inability to explain symptoms I did ask the nurse to send another urine culture now that she has completed antibiotics.    Hypothyroidism -the levothyroxine was increased and the TSH will be monitored.    CHF - lasix, stable, euvolemic clinically    Psych - Same meds, psych does see her    The essential elements of the hospital History and Physical as well as the Discharge Summary have been confirmed to the best of my ability by means of interviewing the patient plus a review of the hospital records. Please note that this entry was created in a shared electronic medical record.     All available hospital and outpatient records have been reviewed. Will continue other current drug therapies as ordered on the continuation of care documents. Physical and Occupational Therapy will assess and treat per POC. Analgesia for identified pain symptoms. Continue the various medicines for bowel and bladder symptoms as well as the vitamins and supplements per hospital instructions. Will assess and provide local care to abnormalities of skin integrity. Drug to drug interaction data as noted by the pharmacy has been reviewed. The patient's condition warrants the continuation of these drugs as prescribed by the medical specialists. Discharge planning will be coordinated through the  department. I have reviewed any advanced directive documentation that is contained in the admission packet as  directives indicating whether a surrogate decision maker has been identified.       Electronically Signed By: Alirio Augustine MD   12/27/23 11:34 AM   [facility]

## 2023-12-28 ENCOUNTER — LAB REQUISITION (OUTPATIENT)
Dept: LAB | Facility: HOSPITAL | Age: 78
End: 2023-12-28
Payer: MEDICARE

## 2023-12-28 DIAGNOSIS — R41.82 ALTERED MENTAL STATUS, UNSPECIFIED: ICD-10-CM

## 2023-12-28 LAB
APPEARANCE UR: ABNORMAL
BACTERIA #/AREA URNS AUTO: ABNORMAL /HPF
BILIRUB UR STRIP.AUTO-MCNC: NEGATIVE MG/DL
CAOX CRY #/AREA UR COMP ASSIST: ABNORMAL /HPF
COLOR UR: YELLOW
GLUCOSE UR STRIP.AUTO-MCNC: NORMAL MG/DL
HOLD SPECIMEN: NORMAL
KETONES UR STRIP.AUTO-MCNC: NEGATIVE MG/DL
LEUKOCYTE ESTERASE UR QL STRIP.AUTO: ABNORMAL
MUCOUS THREADS #/AREA URNS AUTO: ABNORMAL /LPF
NITRITE UR QL STRIP.AUTO: NEGATIVE
PH UR STRIP.AUTO: 6.5 [PH]
PROT UR STRIP.AUTO-MCNC: NEGATIVE MG/DL
RBC # UR STRIP.AUTO: NEGATIVE /UL
RBC #/AREA URNS AUTO: ABNORMAL /HPF
SP GR UR STRIP.AUTO: 1.02
SQUAMOUS #/AREA URNS AUTO: ABNORMAL /HPF
UROBILINOGEN UR STRIP.AUTO-MCNC: NORMAL MG/DL
WBC #/AREA URNS AUTO: ABNORMAL /HPF
WBC CLUMPS #/AREA URNS AUTO: ABNORMAL /HPF

## 2024-01-01 ENCOUNTER — LAB REQUISITION (OUTPATIENT)
Dept: LAB | Facility: HOSPITAL | Age: 79
End: 2024-01-01
Payer: MEDICARE

## 2024-01-01 DIAGNOSIS — R41.82 ALTERED MENTAL STATUS, UNSPECIFIED: ICD-10-CM

## 2024-01-01 DIAGNOSIS — J96.11 CHRONIC RESPIRATORY FAILURE WITH HYPOXIA (MULTI): ICD-10-CM

## 2024-01-01 LAB
ANION GAP SERPL CALC-SCNC: 7 MMOL/L
BACTERIA UR CULT: ABNORMAL
BUN SERPL-MCNC: 8 MG/DL (ref 8–25)
CALCIUM SERPL-MCNC: 8.9 MG/DL (ref 8.5–10.4)
CHLORIDE SERPL-SCNC: 109 MMOL/L (ref 97–107)
CO2 SERPL-SCNC: 22 MMOL/L (ref 24–31)
CREAT SERPL-MCNC: 0.8 MG/DL (ref 0.4–1.6)
ERYTHROCYTE [DISTWIDTH] IN BLOOD BY AUTOMATED COUNT: 13.1 % (ref 11.5–14.5)
GFR SERPL CREATININE-BSD FRML MDRD: 75 ML/MIN/1.73M*2
GLUCOSE SERPL-MCNC: 98 MG/DL (ref 65–99)
HCT VFR BLD AUTO: 40.2 % (ref 36–46)
HGB BLD-MCNC: 13.1 G/DL (ref 12–16)
MCH RBC QN AUTO: 32.3 PG (ref 26–34)
MCHC RBC AUTO-ENTMCNC: 32.6 G/DL (ref 32–36)
MCV RBC AUTO: 99 FL (ref 80–100)
NRBC BLD-RTO: 0 /100 WBCS (ref 0–0)
PLATELET # BLD AUTO: 290 X10*3/UL (ref 150–450)
POTASSIUM SERPL-SCNC: 4.2 MMOL/L (ref 3.4–5.1)
RBC # BLD AUTO: 4.05 X10*6/UL (ref 4–5.2)
SODIUM SERPL-SCNC: 138 MMOL/L (ref 133–145)
WBC # BLD AUTO: 9.8 X10*3/UL (ref 4.4–11.3)

## 2024-01-01 PROCEDURE — 80048 BASIC METABOLIC PNL TOTAL CA: CPT | Mod: OUT | Performed by: INTERNAL MEDICINE

## 2024-01-01 PROCEDURE — 85027 COMPLETE CBC AUTOMATED: CPT | Mod: OUT | Performed by: INTERNAL MEDICINE

## 2024-01-08 ENCOUNTER — LAB REQUISITION (OUTPATIENT)
Dept: LAB | Facility: HOSPITAL | Age: 79
End: 2024-01-08
Payer: MEDICARE

## 2024-01-08 DIAGNOSIS — R41.82 ALTERED MENTAL STATUS, UNSPECIFIED: ICD-10-CM

## 2024-01-08 DIAGNOSIS — J96.11 CHRONIC RESPIRATORY FAILURE WITH HYPOXIA (MULTI): ICD-10-CM

## 2024-01-08 LAB
ANION GAP SERPL CALC-SCNC: 7 MMOL/L
BUN SERPL-MCNC: 9 MG/DL (ref 8–25)
CALCIUM SERPL-MCNC: 9.1 MG/DL (ref 8.5–10.4)
CHLORIDE SERPL-SCNC: 110 MMOL/L (ref 97–107)
CO2 SERPL-SCNC: 24 MMOL/L (ref 24–31)
CREAT SERPL-MCNC: 0.8 MG/DL (ref 0.4–1.6)
ERYTHROCYTE [DISTWIDTH] IN BLOOD BY AUTOMATED COUNT: 13 % (ref 11.5–14.5)
GFR SERPL CREATININE-BSD FRML MDRD: 75 ML/MIN/1.73M*2
GLUCOSE SERPL-MCNC: 91 MG/DL (ref 65–99)
HCT VFR BLD AUTO: 40.3 % (ref 36–46)
HGB BLD-MCNC: 13.3 G/DL (ref 12–16)
MCH RBC QN AUTO: 32.9 PG (ref 26–34)
MCHC RBC AUTO-ENTMCNC: 33 G/DL (ref 32–36)
MCV RBC AUTO: 100 FL (ref 80–100)
NRBC BLD-RTO: 0 /100 WBCS (ref 0–0)
PLATELET # BLD AUTO: 232 X10*3/UL (ref 150–450)
POTASSIUM SERPL-SCNC: 4.1 MMOL/L (ref 3.4–5.1)
RBC # BLD AUTO: 4.04 X10*6/UL (ref 4–5.2)
SODIUM SERPL-SCNC: 141 MMOL/L (ref 133–145)
WBC # BLD AUTO: 8.6 X10*3/UL (ref 4.4–11.3)

## 2024-01-08 PROCEDURE — 85027 COMPLETE CBC AUTOMATED: CPT | Mod: OUT | Performed by: INTERNAL MEDICINE

## 2024-01-08 PROCEDURE — 80048 BASIC METABOLIC PNL TOTAL CA: CPT | Mod: OUT | Performed by: INTERNAL MEDICINE

## 2024-01-11 ENCOUNTER — NURSING HOME VISIT (OUTPATIENT)
Dept: POST ACUTE CARE | Facility: EXTERNAL LOCATION | Age: 79
End: 2024-01-11
Payer: MEDICARE

## 2024-01-11 DIAGNOSIS — F31.32 BIPOLAR AFFECTIVE DISORDER, CURRENTLY DEPRESSED, MODERATE (MULTI): ICD-10-CM

## 2024-01-11 DIAGNOSIS — E03.9 ACQUIRED HYPOTHYROIDISM: ICD-10-CM

## 2024-01-11 DIAGNOSIS — I50.9 CHRONIC CONGESTIVE HEART FAILURE, UNSPECIFIED HEART FAILURE TYPE (MULTI): Primary | ICD-10-CM

## 2024-01-11 PROCEDURE — 99308 SBSQ NF CARE LOW MDM 20: CPT | Performed by: INTERNAL MEDICINE

## 2024-01-11 NOTE — PROGRESS NOTES
PROGRESS NOTE      Mariella Cuello is a 78 y.o. female seen in follow up at [facility].    ASSESSMENT / PLAN:  Diagnoses and all orders for this visit:  Chronic congestive heart failure, unspecified heart failure type (CMS/HCC)  Acquired hypothyroidism  Bipolar affective disorder, currently depressed, moderate (CMS/HCC)    Patient Active Problem List   Diagnosis    Chronic congestive heart failure (CMS/HCC)    Acquired hypothyroidism    Altered mental status, unspecified altered mental status type    Bipolar disorder (CMS/HCC)    Schizoaffective disorder (CMS/HCC)    Alzheimer's dementia (CMS/HCC)    Acute cystitis without hematuria     Continue the currently prescribed medications as outlined in [facility].      Subjective   Overall seems to be doing well no complaints per staff.  Chart and orders reviewed.    Review of Systems   Objective   Vital signs reviewed in [facility].  Physical Exam  Vitals reviewed.   Constitutional:       Appearance: Normal appearance.   Cardiovascular:      Rate and Rhythm: Normal rate and regular rhythm.      Heart sounds: No murmur heard.  Pulmonary:      Breath sounds: Normal breath sounds. No wheezing, rhonchi or rales.   Musculoskeletal:      Right lower leg: No edema.      Left lower leg: No edema.         Medical History as seen below has been reviewed and updated in the chart.  Past Medical History:   Diagnosis Date    Alzheimer disease (CMS/HCC)     Anemia     Atherosclerotic heart disease of native coronary artery without angina pectoris 05/12/2021    Atherosclerosis of coronary artery without angina pectoris, unspecified vessel or lesion type, unspecified whether native or transplanted heart    Bipolar disorder (CMS/HCC)     Cardiomyopathy (CMS/HCC)     Chronic systolic (congestive) heart failure (CMS/HCC) 05/12/2021    Chronic systolic heart failure, ACC/AHA stage C    Depression     Dysphagia     Gastritis     History of COVID-19     Hyperlipidemia      Hypertension     Hypothyroidism     Major depressive disorder, recurrent, unspecified (CMS/HCC) 05/12/2021    Major depression, recurrent    Obstipation     Respiratory failure (CMS/HCC)     Schizoaffective disorder (CMS/HCC)     Spinal stenosis     Stroke (CMS/HCC)     Systolic heart failure (CMS/HCC)      Past Surgical History:   Procedure Laterality Date    CATARACT EXTRACTION  02/29/2016    Cataract Surgery    CORONARY ARTERY BYPASS GRAFT      MR HEAD ANGIO WO IV CONTRAST  01/22/2014    MR HEAD ANGIO WO IV CONTRAST [facility]    TONSILLECTOMY  02/29/2016    Tonsillectomy    TOTAL ABDOMINAL HYSTERECTOMY  02/29/2016    Total Abdominal Hysterectomy     No family history on file.  No Known Allergies  Current Outpatient Medications on File Prior to Visit   Medication Sig Dispense Refill    acetaminophen (Tylenol) 325 mg tablet Take 2 tablets (650 mg) by mouth every 6 hours if needed for mild pain (1 - 3) or fever (temp greater than 38.0 C).      acetaminophen (Tylenol) 650 mg suppository Insert 1 suppository (650 mg) into the rectum every 6 hours if needed for mild pain (1 - 3) or fever (temp greater than 38.0 C).      albuterol sulfate (Proair Digihaler) 90 mcg/actuation aero powdr breath act w/sensor inhaler Inhale 2 puffs every 6 hours if needed for wheezing.      alum-mag hydroxide-simeth (Maalox MAX) 400-400-40 mg/5 mL suspension Take 30 mL by mouth every 4 hours if needed for indigestion or heartburn.      ARIPiprazole (Abilify) 2 mg tablet Take 1 tablet (2 mg) by mouth once daily.      ARIPiprazole (Abilify) 5 mg tablet Take 1 tablet (5 mg) by mouth once daily.      aspirin 81 mg EC tablet Take 1 tablet (81 mg) by mouth once daily.      atorvastatin (Lipitor) 20 mg tablet Take 1 tablet (20 mg) by mouth once daily.      bisacodyl (Dulcolax) 10 mg suppository Insert 1 suppository (10 mg) into the rectum once daily as needed for constipation.      bisoprolol (Zebeta) 5 mg tablet Take 0.5 tablets (2.5 mg)  by mouth 2 times a day.      busPIRone (Buspar) 5 mg tablet Take 1 tablet (5 mg) by mouth 3 times a day.      cholecalciferol (Vitamin D-3) 5,000 Units tablet Take 1 tablet (5,000 Units) by mouth once daily.      diclofenac epolamine 1.3 % patch Place 1 patch on the skin every 12 hours.      diclofenac sodium (Voltaren) 1 % gel gel Apply 1 Application topically every 8 hours if needed (arthritis pain).      docusate sodium (Colace) 100 mg capsule Take 1 capsule (100 mg) by mouth 2 times a day.      furosemide (Lasix) 20 mg tablet Take 1 tablet (20 mg) by mouth once daily. Every Friday      guaiFENesin (Robitussin) 100 mg/5 mL syrup Take 10 mL (200 mg) by mouth every 4 hours if needed for cough.      lamoTRIgine (LaMICtal) 100 mg tablet Take 1 tablet (100 mg) by mouth 2 times a day.      levothyroxine (Synthroid, Levoxyl) 100 mcg tablet Take 112 mcg by mouth once daily.      loperamide (Imodium A-D) 2 mg tablet Take 1 tablet (2 mg) by mouth every 3 hours if needed for diarrhea.      magnesium hydroxide (Milk of Magnesia) 2,400 mg/10 mL suspension suspension Take 10 mL by mouth once daily as needed for constipation.      melatonin 3 mg capsule Take 6 mg by mouth once daily.      ondansetron (Zofran) 4 mg tablet Take 1 tablet (4 mg) by mouth every 8 hours if needed for nausea or vomiting.      potassium chloride CR 20 mEq ER tablet Take 1 tablet (20 mEq) by mouth once daily.      sennosides (Senokot) 8.6 mg tablet Take 2 tablets (17.2 mg) by mouth once daily.      sodium phosphates (Fleets) 19-7 gram/118 mL enema enema Insert 118 mL (1 enema) into the rectum once daily as needed for constipation.      topiramate (Topamax) 50 mg tablet Take 1 tablet (50 mg) by mouth 2 times a day.      traMADol (Ultram) 50 mg tablet Take 1 tablet (50 mg) by mouth 2 times a day as needed for severe pain (7 - 10). 60 tablet 2    traZODone (Desyrel) 50 mg tablet Take 0.5 tablets (25 mg) by mouth once daily.      venlafaxine XR (Effexor-XR)  75 mg 24 hr capsule Take 3 capsules (225 mg) by mouth once daily.       No current facility-administered medications on file prior to visit.     Immunization History   Administered Date(s) Administered    [employer] Purple Cap SARS-CoV-2 02/02/2021, 03/16/2021, 10/29/2021     Alirio Augustine MD

## 2024-01-11 NOTE — LETTER
Patient: Mariella Cuello  : 1945    Encounter Date: 2024    PROGRESS NOTE      Mariella Cuello is a 78 y.o. female seen in follow up at [facility].    ASSESSMENT / PLAN:  Diagnoses and all orders for this visit:  Chronic congestive heart failure, unspecified heart failure type (CMS/HCC)  Acquired hypothyroidism  Bipolar affective disorder, currently depressed, moderate (CMS/HCC)    Patient Active Problem List   Diagnosis   • Chronic congestive heart failure (CMS/HCC)   • Acquired hypothyroidism   • Altered mental status, unspecified altered mental status type   • Bipolar disorder (CMS/HCC)   • Schizoaffective disorder (CMS/HCC)   • Alzheimer's dementia (CMS/HCC)   • Acute cystitis without hematuria     Continue the currently prescribed medications as outlined in [facility].      Subjective  Overall seems to be doing well no complaints per staff.  Chart and orders reviewed.    Review of Systems   Objective  Vital signs reviewed in [facility].  Physical Exam  Vitals reviewed.   Constitutional:       Appearance: Normal appearance.   Cardiovascular:      Rate and Rhythm: Normal rate and regular rhythm.      Heart sounds: No murmur heard.  Pulmonary:      Breath sounds: Normal breath sounds. No wheezing, rhonchi or rales.   Musculoskeletal:      Right lower leg: No edema.      Left lower leg: No edema.         Medical History as seen below has been reviewed and updated in the chart.  Past Medical History:   Diagnosis Date   • Alzheimer disease (CMS/HCC)    • Anemia    • Atherosclerotic heart disease of native coronary artery without angina pectoris 2021    Atherosclerosis of coronary artery without angina pectoris, unspecified vessel or lesion type, unspecified whether native or transplanted heart   • Bipolar disorder (CMS/HCC)    • Cardiomyopathy (CMS/HCC)    • Chronic systolic (congestive) heart failure (CMS/HCC) 2021    Chronic systolic heart failure, ACC/AHA stage C   •  Depression    • Dysphagia    • Gastritis    • History of COVID-19    • Hyperlipidemia    • Hypertension    • Hypothyroidism    • Major depressive disorder, recurrent, unspecified (CMS/HCC) 05/12/2021    Major depression, recurrent   • Obstipation    • Respiratory failure (CMS/HCC)    • Schizoaffective disorder (CMS/HCC)    • Spinal stenosis    • Stroke (CMS/HCC)    • Systolic heart failure (CMS/HCC)      Past Surgical History:   Procedure Laterality Date   • CATARACT EXTRACTION  02/29/2016    Cataract Surgery   • CORONARY ARTERY BYPASS GRAFT     • MR HEAD ANGIO WO IV CONTRAST  01/22/2014    MR HEAD ANGIO WO IV CONTRAST [facility]   • TONSILLECTOMY  02/29/2016    Tonsillectomy   • TOTAL ABDOMINAL HYSTERECTOMY  02/29/2016    Total Abdominal Hysterectomy     No family history on file.  No Known Allergies  Current Outpatient Medications on File Prior to Visit   Medication Sig Dispense Refill   • acetaminophen (Tylenol) 325 mg tablet Take 2 tablets (650 mg) by mouth every 6 hours if needed for mild pain (1 - 3) or fever (temp greater than 38.0 C).     • acetaminophen (Tylenol) 650 mg suppository Insert 1 suppository (650 mg) into the rectum every 6 hours if needed for mild pain (1 - 3) or fever (temp greater than 38.0 C).     • albuterol sulfate (Proair Digihaler) 90 mcg/actuation aero powdr breath act w/sensor inhaler Inhale 2 puffs every 6 hours if needed for wheezing.     • alum-mag hydroxide-simeth (Maalox MAX) 400-400-40 mg/5 mL suspension Take 30 mL by mouth every 4 hours if needed for indigestion or heartburn.     • ARIPiprazole (Abilify) 2 mg tablet Take 1 tablet (2 mg) by mouth once daily.     • ARIPiprazole (Abilify) 5 mg tablet Take 1 tablet (5 mg) by mouth once daily.     • aspirin 81 mg EC tablet Take 1 tablet (81 mg) by mouth once daily.     • atorvastatin (Lipitor) 20 mg tablet Take 1 tablet (20 mg) by mouth once daily.     • bisacodyl (Dulcolax) 10 mg suppository Insert 1 suppository (10 mg) into  the rectum once daily as needed for constipation.     • bisoprolol (Zebeta) 5 mg tablet Take 0.5 tablets (2.5 mg) by mouth 2 times a day.     • busPIRone (Buspar) 5 mg tablet Take 1 tablet (5 mg) by mouth 3 times a day.     • cholecalciferol (Vitamin D-3) 5,000 Units tablet Take 1 tablet (5,000 Units) by mouth once daily.     • diclofenac epolamine 1.3 % patch Place 1 patch on the skin every 12 hours.     • diclofenac sodium (Voltaren) 1 % gel gel Apply 1 Application topically every 8 hours if needed (arthritis pain).     • docusate sodium (Colace) 100 mg capsule Take 1 capsule (100 mg) by mouth 2 times a day.     • furosemide (Lasix) 20 mg tablet Take 1 tablet (20 mg) by mouth once daily. Every Friday     • guaiFENesin (Robitussin) 100 mg/5 mL syrup Take 10 mL (200 mg) by mouth every 4 hours if needed for cough.     • lamoTRIgine (LaMICtal) 100 mg tablet Take 1 tablet (100 mg) by mouth 2 times a day.     • levothyroxine (Synthroid, Levoxyl) 100 mcg tablet Take 112 mcg by mouth once daily.     • loperamide (Imodium A-D) 2 mg tablet Take 1 tablet (2 mg) by mouth every 3 hours if needed for diarrhea.     • magnesium hydroxide (Milk of Magnesia) 2,400 mg/10 mL suspension suspension Take 10 mL by mouth once daily as needed for constipation.     • melatonin 3 mg capsule Take 6 mg by mouth once daily.     • ondansetron (Zofran) 4 mg tablet Take 1 tablet (4 mg) by mouth every 8 hours if needed for nausea or vomiting.     • potassium chloride CR 20 mEq ER tablet Take 1 tablet (20 mEq) by mouth once daily.     • sennosides (Senokot) 8.6 mg tablet Take 2 tablets (17.2 mg) by mouth once daily.     • sodium phosphates (Fleets) 19-7 gram/118 mL enema enema Insert 118 mL (1 enema) into the rectum once daily as needed for constipation.     • topiramate (Topamax) 50 mg tablet Take 1 tablet (50 mg) by mouth 2 times a day.     • traMADol (Ultram) 50 mg tablet Take 1 tablet (50 mg) by mouth 2 times a day as needed for severe pain (7 -  10). 60 tablet 2   • traZODone (Desyrel) 50 mg tablet Take 0.5 tablets (25 mg) by mouth once daily.     • venlafaxine XR (Effexor-XR) 75 mg 24 hr capsule Take 3 capsules (225 mg) by mouth once daily.       No current facility-administered medications on file prior to visit.     Immunization History   Administered Date(s) Administered   • Pfizer Purple Cap SARS-CoV-2 02/02/2021, 03/16/2021, 10/29/2021     Alirio Augustine MD      Electronically Signed By: Alirio Augustine MD   1/11/24  2:18 PM

## 2024-01-15 ENCOUNTER — LAB REQUISITION (OUTPATIENT)
Dept: LAB | Facility: HOSPITAL | Age: 79
End: 2024-01-15
Payer: MEDICARE

## 2024-01-15 DIAGNOSIS — J96.11 CHRONIC RESPIRATORY FAILURE WITH HYPOXIA (MULTI): ICD-10-CM

## 2024-01-15 LAB
ANION GAP SERPL CALC-SCNC: 6 MMOL/L
BUN SERPL-MCNC: 8 MG/DL (ref 8–25)
CALCIUM SERPL-MCNC: 9.2 MG/DL (ref 8.5–10.4)
CHLORIDE SERPL-SCNC: 111 MMOL/L (ref 97–107)
CO2 SERPL-SCNC: 25 MMOL/L (ref 24–31)
CREAT SERPL-MCNC: 0.9 MG/DL (ref 0.4–1.6)
EGFRCR SERPLBLD CKD-EPI 2021: 65 ML/MIN/1.73M*2
ERYTHROCYTE [DISTWIDTH] IN BLOOD BY AUTOMATED COUNT: 13.1 % (ref 11.5–14.5)
GLUCOSE SERPL-MCNC: 91 MG/DL (ref 65–99)
HCT VFR BLD AUTO: 40.1 % (ref 36–46)
HGB BLD-MCNC: 13 G/DL (ref 12–16)
MCH RBC QN AUTO: 32.2 PG (ref 26–34)
MCHC RBC AUTO-ENTMCNC: 32.4 G/DL (ref 32–36)
MCV RBC AUTO: 99 FL (ref 80–100)
NRBC BLD-RTO: 0 /100 WBCS (ref 0–0)
PLATELET # BLD AUTO: 210 X10*3/UL (ref 150–450)
POTASSIUM SERPL-SCNC: 4.1 MMOL/L (ref 3.4–5.1)
RBC # BLD AUTO: 4.04 X10*6/UL (ref 4–5.2)
SODIUM SERPL-SCNC: 142 MMOL/L (ref 133–145)
WBC # BLD AUTO: 8.3 X10*3/UL (ref 4.4–11.3)

## 2024-01-15 PROCEDURE — 85027 COMPLETE CBC AUTOMATED: CPT | Mod: OUT | Performed by: INTERNAL MEDICINE

## 2024-01-15 PROCEDURE — 80048 BASIC METABOLIC PNL TOTAL CA: CPT | Mod: OUT | Performed by: INTERNAL MEDICINE

## 2024-02-04 PROCEDURE — 87086 URINE CULTURE/COLONY COUNT: CPT | Mod: OUT,TRILAB,WESLAB | Performed by: INTERNAL MEDICINE

## 2024-02-04 PROCEDURE — 81001 URINALYSIS AUTO W/SCOPE: CPT | Mod: OUT | Performed by: INTERNAL MEDICINE

## 2024-02-05 ENCOUNTER — LAB REQUISITION (OUTPATIENT)
Dept: LAB | Facility: HOSPITAL | Age: 79
End: 2024-02-05
Payer: COMMERCIAL

## 2024-02-05 DIAGNOSIS — R41.82 ALTERED MENTAL STATUS, UNSPECIFIED: ICD-10-CM

## 2024-02-05 LAB
APPEARANCE UR: ABNORMAL
BACTERIA #/AREA URNS AUTO: ABNORMAL /HPF
BILIRUB UR STRIP.AUTO-MCNC: NEGATIVE MG/DL
CAOX CRY #/AREA UR COMP ASSIST: ABNORMAL /HPF
COLOR UR: YELLOW
GLUCOSE UR STRIP.AUTO-MCNC: NORMAL MG/DL
HOLD SPECIMEN: NORMAL
KETONES UR STRIP.AUTO-MCNC: NEGATIVE MG/DL
LEUKOCYTE ESTERASE UR QL STRIP.AUTO: ABNORMAL
MUCOUS THREADS #/AREA URNS AUTO: ABNORMAL /LPF
NITRITE UR QL STRIP.AUTO: NEGATIVE
PH UR STRIP.AUTO: 6.5 [PH]
PROT UR STRIP.AUTO-MCNC: NEGATIVE MG/DL
RBC # UR STRIP.AUTO: NEGATIVE /UL
RBC #/AREA URNS AUTO: ABNORMAL /HPF
SP GR UR STRIP.AUTO: 1.02
SQUAMOUS #/AREA URNS AUTO: ABNORMAL /HPF
UROBILINOGEN UR STRIP.AUTO-MCNC: NORMAL MG/DL
WBC #/AREA URNS AUTO: >50 /HPF

## 2024-02-06 LAB — BACTERIA UR CULT: NORMAL

## 2024-02-12 ENCOUNTER — LAB REQUISITION (OUTPATIENT)
Dept: LAB | Facility: HOSPITAL | Age: 79
End: 2024-02-12
Payer: COMMERCIAL

## 2024-02-12 DIAGNOSIS — R41.82 ALTERED MENTAL STATUS, UNSPECIFIED: ICD-10-CM

## 2024-02-12 LAB
ANION GAP SERPL CALC-SCNC: 8 MMOL/L
BUN SERPL-MCNC: 7 MG/DL (ref 8–25)
CALCIUM SERPL-MCNC: 9.3 MG/DL (ref 8.5–10.4)
CHLORIDE SERPL-SCNC: 109 MMOL/L (ref 97–107)
CO2 SERPL-SCNC: 24 MMOL/L (ref 24–31)
CREAT SERPL-MCNC: 0.9 MG/DL (ref 0.4–1.6)
EGFRCR SERPLBLD CKD-EPI 2021: 65 ML/MIN/1.73M*2
ERYTHROCYTE [DISTWIDTH] IN BLOOD BY AUTOMATED COUNT: 13.2 % (ref 11.5–14.5)
GLUCOSE SERPL-MCNC: 94 MG/DL (ref 65–99)
HCT VFR BLD AUTO: 39.8 % (ref 36–46)
HGB BLD-MCNC: 13 G/DL (ref 12–16)
MCH RBC QN AUTO: 33 PG (ref 26–34)
MCHC RBC AUTO-ENTMCNC: 32.7 G/DL (ref 32–36)
MCV RBC AUTO: 101 FL (ref 80–100)
NRBC BLD-RTO: ABNORMAL /100{WBCS}
PLATELET # BLD AUTO: 219 X10*3/UL (ref 150–450)
POTASSIUM SERPL-SCNC: 4.1 MMOL/L (ref 3.4–5.1)
RBC # BLD AUTO: 3.94 X10*6/UL (ref 4–5.2)
SODIUM SERPL-SCNC: 141 MMOL/L (ref 133–145)
WBC # BLD AUTO: 8 X10*3/UL (ref 4.4–11.3)

## 2024-02-12 PROCEDURE — 80048 BASIC METABOLIC PNL TOTAL CA: CPT | Mod: OUT | Performed by: INTERNAL MEDICINE

## 2024-02-12 PROCEDURE — 85027 COMPLETE CBC AUTOMATED: CPT | Mod: OUT | Performed by: INTERNAL MEDICINE

## 2024-02-16 ENCOUNTER — NURSING HOME VISIT (OUTPATIENT)
Dept: POST ACUTE CARE | Facility: EXTERNAL LOCATION | Age: 79
End: 2024-02-16
Payer: MEDICARE

## 2024-02-16 VITALS
BODY MASS INDEX: 33.01 KG/M2 | RESPIRATION RATE: 18 BRPM | TEMPERATURE: 98 F | DIASTOLIC BLOOD PRESSURE: 46 MMHG | SYSTOLIC BLOOD PRESSURE: 106 MMHG | HEART RATE: 67 BPM | OXYGEN SATURATION: 95 % | WEIGHT: 192.4 LBS

## 2024-02-16 DIAGNOSIS — F31.32 BIPOLAR AFFECTIVE DISORDER, CURRENTLY DEPRESSED, MODERATE (MULTI): ICD-10-CM

## 2024-02-16 DIAGNOSIS — I50.9 CHRONIC CONGESTIVE HEART FAILURE, UNSPECIFIED HEART FAILURE TYPE (MULTI): Primary | ICD-10-CM

## 2024-02-16 DIAGNOSIS — E03.9 ACQUIRED HYPOTHYROIDISM: ICD-10-CM

## 2024-02-16 PROCEDURE — 99308 SBSQ NF CARE LOW MDM 20: CPT | Performed by: PHYSICIAN ASSISTANT

## 2024-02-16 ASSESSMENT — ENCOUNTER SYMPTOMS
VOMITING: 0
APPETITE CHANGE: 0
WEAKNESS: 0
FEVER: 0
COUGH: 0
DYSURIA: 0
SHORTNESS OF BREATH: 0
HEADACHES: 0
FREQUENCY: 0
CONSTIPATION: 0
DIZZINESS: 0
CONFUSION: 0
DIARRHEA: 0
NAUSEA: 0
FLANK PAIN: 0
WHEEZING: 0
TREMORS: 0
ABDOMINAL PAIN: 0
HEMATURIA: 0
CHILLS: 0
NERVOUS/ANXIOUS: 0

## 2024-02-16 NOTE — PROGRESS NOTES
No chief complaint on file.      Subjective   [id number] : Mariella Cuello is a 78 y.o. female LTC resident at [facility].   HPI  Pt  with h/o CHF, hypothyroid and bipolar disorder seen in routine follow up.   Pt seen while resting in bed.  She is alert, pleasant and interactive.  She reports that she is feeling well.  She is a little more talkative than usual today.  She offers no new complaints.  She denies any pain.  No shortness of breath or cough.  She has been eating and drinking well.  Her weight is stable.  She has no lower leg edema.   Code status is DNR- CCA.  Review of Systems   Constitutional:  Negative for appetite change, chills and fever.   Respiratory:  Negative for cough, shortness of breath and wheezing.    Cardiovascular:  Negative for chest pain and leg swelling.   Gastrointestinal:  Negative for abdominal pain, constipation, diarrhea, nausea and vomiting.   Genitourinary:  Negative for dysuria, flank pain, frequency and hematuria.   Neurological:  Negative for dizziness, tremors, weakness and headaches.   Psychiatric/Behavioral:  Negative for confusion. The patient is not nervous/anxious.    All other systems reviewed and are negative.      Objective   BP (!) 106/46   Pulse 67   Temp 36.7 °C (98 °F)   Resp 18   Wt 87.3 kg (192 lb 6.4 oz)   SpO2 95%   BMI 33.01 kg/m²    Physical Exam  Constitutional:       General: She is not in acute distress.  Eyes:      Conjunctiva/sclera: Conjunctivae normal.      Pupils: Pupils are equal, round, and reactive to light.   Cardiovascular:      Rate and Rhythm: Normal rate and regular rhythm.      Heart sounds: No murmur heard.  Pulmonary:      Effort: Pulmonary effort is normal.      Breath sounds: No wheezing, rhonchi or rales.   Abdominal:      General: Abdomen is flat. Bowel sounds are normal. There is no distension.      Palpations: Abdomen is soft. There is no mass.      Tenderness: There is no abdominal tenderness.   Musculoskeletal:          "General: No swelling. Normal range of motion.   Skin:     General: Skin is warm and dry.      Findings: No rash.   Neurological:      General: No focal deficit present.      Mental Status: She is alert and oriented to person, place, and time. Mental status is at baseline.       No lab exists for component: \"CBC BMP\"  Assessment/Plan   Chronic congestive heart failure (CMS/HCC) I50.9        Continue lasix.  Stable.   Monitor weights and labs.   Labs done this week were reviewed and are stable.            Acquired hypothyroidism E03.9       Synthroid 125mcg daily.   Pt need TSH.   Lab ordered.           Bipolar affective disorder, currently depressed, moderate (CMS/HCC) F31.32       Continue current meds.  Pt follows with psych                   Time spent: 15 min in review of chart, labs and orders, consultation with pt and documentation.   "

## 2024-02-16 NOTE — LETTER
Patient: Mariella Cuello  : 1945    Encounter Date: 2024    No chief complaint on file.      Subjective  [id number] : Mariella Cuello is a 78 y.o. female LTC resident at [facility].   HPI  Pt  with h/o CHF, hypothyroid and bipolar disorder seen in routine follow up.   Pt seen while resting in bed.  She is alert, pleasant and interactive.  She reports that she is feeling well.  She is a little more talkative than usual today.  She offers no new complaints.  She denies any pain.  No shortness of breath or cough.  She has been eating and drinking well.  Her weight is stable.  She has no lower leg edema.   Code status is DNR- CCA.  Review of Systems   Constitutional:  Negative for appetite change, chills and fever.   Respiratory:  Negative for cough, shortness of breath and wheezing.    Cardiovascular:  Negative for chest pain and leg swelling.   Gastrointestinal:  Negative for abdominal pain, constipation, diarrhea, nausea and vomiting.   Genitourinary:  Negative for dysuria, flank pain, frequency and hematuria.   Neurological:  Negative for dizziness, tremors, weakness and headaches.   Psychiatric/Behavioral:  Negative for confusion. The patient is not nervous/anxious.    All other systems reviewed and are negative.      Objective  BP (!) 106/46   Pulse 67   Temp 36.7 °C (98 °F)   Resp 18   Wt 87.3 kg (192 lb 6.4 oz)   SpO2 95%   BMI 33.01 kg/m²    Physical Exam  Constitutional:       General: She is not in acute distress.  Eyes:      Conjunctiva/sclera: Conjunctivae normal.      Pupils: Pupils are equal, round, and reactive to light.   Cardiovascular:      Rate and Rhythm: Normal rate and regular rhythm.      Heart sounds: No murmur heard.  Pulmonary:      Effort: Pulmonary effort is normal.      Breath sounds: No wheezing, rhonchi or rales.   Abdominal:      General: Abdomen is flat. Bowel sounds are normal. There is no distension.      Palpations: Abdomen is soft. There is no mass.       "Tenderness: There is no abdominal tenderness.   Musculoskeletal:         General: No swelling. Normal range of motion.   Skin:     General: Skin is warm and dry.      Findings: No rash.   Neurological:      General: No focal deficit present.      Mental Status: She is alert and oriented to person, place, and time. Mental status is at baseline.       No lab exists for component: \"CBC BMP\"  Assessment/Plan  Chronic congestive heart failure (CMS/HCC) I50.9        Continue lasix.  Stable.   Monitor weights and labs.   Labs done this week were reviewed and are stable.            Acquired hypothyroidism E03.9       Synthroid 125mcg daily.   Pt need TSH.   Lab ordered.           Bipolar affective disorder, currently depressed, moderate (CMS/HCC) F31.32       Continue current meds.  Pt follows with psych                   Time spent: 15 min in review of chart, labs and orders, consultation with pt and documentation.       Electronically Signed By: Ana Paula Paul PA-C   2/16/24 11:49 AM  "

## 2024-02-19 ENCOUNTER — LAB REQUISITION (OUTPATIENT)
Dept: LAB | Facility: HOSPITAL | Age: 79
End: 2024-02-19
Payer: COMMERCIAL

## 2024-02-19 DIAGNOSIS — E03.9 HYPOTHYROIDISM, UNSPECIFIED: ICD-10-CM

## 2024-02-19 LAB — TSH SERPL DL<=0.05 MIU/L-ACNC: 0.31 MIU/L (ref 0.27–4.2)

## 2024-02-19 PROCEDURE — 84443 ASSAY THYROID STIM HORMONE: CPT | Mod: OUT | Performed by: INTERNAL MEDICINE

## 2024-03-11 ENCOUNTER — LAB REQUISITION (OUTPATIENT)
Dept: LAB | Facility: HOSPITAL | Age: 79
End: 2024-03-11
Payer: COMMERCIAL

## 2024-03-11 DIAGNOSIS — R41.82 ALTERED MENTAL STATUS, UNSPECIFIED: ICD-10-CM

## 2024-03-11 LAB
ANION GAP SERPL CALC-SCNC: 12 MMOL/L
BUN SERPL-MCNC: 12 MG/DL (ref 8–25)
CALCIUM SERPL-MCNC: 9.5 MG/DL (ref 8.5–10.4)
CHLORIDE SERPL-SCNC: 112 MMOL/L (ref 97–107)
CO2 SERPL-SCNC: 21 MMOL/L (ref 24–31)
CREAT SERPL-MCNC: 1 MG/DL (ref 0.4–1.6)
EGFRCR SERPLBLD CKD-EPI 2021: 57 ML/MIN/1.73M*2
ERYTHROCYTE [DISTWIDTH] IN BLOOD BY AUTOMATED COUNT: 12.8 % (ref 11.5–14.5)
GLUCOSE SERPL-MCNC: 93 MG/DL (ref 65–99)
HCT VFR BLD AUTO: 43.5 % (ref 36–46)
HGB BLD-MCNC: 13.9 G/DL (ref 12–16)
MCH RBC QN AUTO: 31.9 PG (ref 26–34)
MCHC RBC AUTO-ENTMCNC: 32 G/DL (ref 32–36)
MCV RBC AUTO: 100 FL (ref 80–100)
NRBC BLD-RTO: 0 /100 WBCS (ref 0–0)
PLATELET # BLD AUTO: 279 X10*3/UL (ref 150–450)
POTASSIUM SERPL-SCNC: 4.2 MMOL/L (ref 3.4–5.1)
RBC # BLD AUTO: 4.36 X10*6/UL (ref 4–5.2)
SODIUM SERPL-SCNC: 145 MMOL/L (ref 133–145)
WBC # BLD AUTO: 10.2 X10*3/UL (ref 4.4–11.3)

## 2024-03-11 PROCEDURE — 80048 BASIC METABOLIC PNL TOTAL CA: CPT | Mod: OUT | Performed by: INTERNAL MEDICINE

## 2024-03-11 PROCEDURE — 85027 COMPLETE CBC AUTOMATED: CPT | Mod: OUT | Performed by: INTERNAL MEDICINE

## 2024-03-22 PROCEDURE — 81001 URINALYSIS AUTO W/SCOPE: CPT | Mod: OUT | Performed by: INTERNAL MEDICINE

## 2024-03-23 ENCOUNTER — LAB REQUISITION (OUTPATIENT)
Dept: LAB | Facility: HOSPITAL | Age: 79
End: 2024-03-23
Payer: COMMERCIAL

## 2024-03-23 DIAGNOSIS — R41.82 ALTERED MENTAL STATUS, UNSPECIFIED: ICD-10-CM

## 2024-03-23 LAB
APPEARANCE UR: ABNORMAL
BACTERIA #/AREA URNS AUTO: ABNORMAL /HPF
BILIRUB UR STRIP.AUTO-MCNC: NEGATIVE MG/DL
CAOX CRY #/AREA UR COMP ASSIST: ABNORMAL /HPF
COLOR UR: YELLOW
GLUCOSE UR STRIP.AUTO-MCNC: NORMAL MG/DL
HYALINE CASTS #/AREA URNS AUTO: ABNORMAL /LPF
KETONES UR STRIP.AUTO-MCNC: NEGATIVE MG/DL
LEUKOCYTE ESTERASE UR QL STRIP.AUTO: ABNORMAL
MUCOUS THREADS #/AREA URNS AUTO: ABNORMAL /LPF
NITRITE UR QL STRIP.AUTO: NEGATIVE
PH UR STRIP.AUTO: 5.5 [PH]
PROT UR STRIP.AUTO-MCNC: NEGATIVE MG/DL
RBC # UR STRIP.AUTO: NEGATIVE /UL
RBC #/AREA URNS AUTO: ABNORMAL /HPF
SP GR UR STRIP.AUTO: 1.02
SQUAMOUS #/AREA URNS AUTO: ABNORMAL /HPF
UROBILINOGEN UR STRIP.AUTO-MCNC: NORMAL MG/DL
WBC #/AREA URNS AUTO: ABNORMAL /HPF
WBC CLUMPS #/AREA URNS AUTO: ABNORMAL /HPF
YEAST BUDDING #/AREA UR COMP ASSIST: PRESENT /HPF

## 2024-04-04 PROCEDURE — 87086 URINE CULTURE/COLONY COUNT: CPT | Mod: OUT,TRILAB,WESLAB | Performed by: INTERNAL MEDICINE

## 2024-04-04 PROCEDURE — 81003 URINALYSIS AUTO W/O SCOPE: CPT | Mod: OUT | Performed by: INTERNAL MEDICINE

## 2024-04-05 ENCOUNTER — LAB REQUISITION (OUTPATIENT)
Dept: LAB | Facility: HOSPITAL | Age: 79
End: 2024-04-05
Payer: COMMERCIAL

## 2024-04-05 DIAGNOSIS — R41.82 ALTERED MENTAL STATUS, UNSPECIFIED: ICD-10-CM

## 2024-04-05 LAB
APPEARANCE UR: ABNORMAL
BACTERIA #/AREA URNS AUTO: ABNORMAL /HPF
BILIRUB UR STRIP.AUTO-MCNC: NEGATIVE MG/DL
CAOX CRY #/AREA UR COMP ASSIST: ABNORMAL /HPF
COLOR UR: YELLOW
GLUCOSE UR STRIP.AUTO-MCNC: NORMAL MG/DL
HOLD SPECIMEN: NORMAL
KETONES UR STRIP.AUTO-MCNC: NEGATIVE MG/DL
LEUKOCYTE ESTERASE UR QL STRIP.AUTO: ABNORMAL
MUCOUS THREADS #/AREA URNS AUTO: ABNORMAL /LPF
NITRITE UR QL STRIP.AUTO: NEGATIVE
PH UR STRIP.AUTO: 6.5 [PH]
PROT UR STRIP.AUTO-MCNC: NEGATIVE MG/DL
RBC # UR STRIP.AUTO: NEGATIVE /UL
RBC #/AREA URNS AUTO: ABNORMAL /HPF
SP GR UR STRIP.AUTO: 1.01
SQUAMOUS #/AREA URNS AUTO: ABNORMAL /HPF
UROBILINOGEN UR STRIP.AUTO-MCNC: NORMAL MG/DL
WBC #/AREA URNS AUTO: ABNORMAL /HPF

## 2024-04-07 LAB — BACTERIA UR CULT: NORMAL

## 2024-04-08 ENCOUNTER — LAB REQUISITION (OUTPATIENT)
Dept: LAB | Facility: HOSPITAL | Age: 79
End: 2024-04-08
Payer: COMMERCIAL

## 2024-04-08 DIAGNOSIS — J96.11 CHRONIC RESPIRATORY FAILURE WITH HYPOXIA (MULTI): ICD-10-CM

## 2024-04-08 LAB
ANION GAP SERPL CALC-SCNC: 6 MMOL/L
BUN SERPL-MCNC: 10 MG/DL (ref 8–25)
CALCIUM SERPL-MCNC: 9.2 MG/DL (ref 8.5–10.4)
CHLORIDE SERPL-SCNC: 111 MMOL/L (ref 97–107)
CO2 SERPL-SCNC: 25 MMOL/L (ref 24–31)
CREAT SERPL-MCNC: 1 MG/DL (ref 0.4–1.6)
EGFRCR SERPLBLD CKD-EPI 2021: 57 ML/MIN/1.73M*2
ERYTHROCYTE [DISTWIDTH] IN BLOOD BY AUTOMATED COUNT: 13 % (ref 11.5–14.5)
GLUCOSE SERPL-MCNC: 88 MG/DL (ref 65–99)
HCT VFR BLD AUTO: 40.1 % (ref 36–46)
HGB BLD-MCNC: 12.9 G/DL (ref 12–16)
MCH RBC QN AUTO: 32 PG (ref 26–34)
MCHC RBC AUTO-ENTMCNC: 32.2 G/DL (ref 32–36)
MCV RBC AUTO: 100 FL (ref 80–100)
NRBC BLD-RTO: 0 /100 WBCS (ref 0–0)
PLATELET # BLD AUTO: 232 X10*3/UL (ref 150–450)
POTASSIUM SERPL-SCNC: 4.5 MMOL/L (ref 3.4–5.1)
RBC # BLD AUTO: 4.03 X10*6/UL (ref 4–5.2)
SODIUM SERPL-SCNC: 142 MMOL/L (ref 133–145)
WBC # BLD AUTO: 7.1 X10*3/UL (ref 4.4–11.3)

## 2024-04-08 PROCEDURE — 82565 ASSAY OF CREATININE: CPT | Mod: 91,OUT | Performed by: INTERNAL MEDICINE

## 2024-04-08 PROCEDURE — 85027 COMPLETE CBC AUTOMATED: CPT | Mod: OUT | Performed by: INTERNAL MEDICINE

## 2024-04-11 ENCOUNTER — NURSING HOME VISIT (OUTPATIENT)
Dept: POST ACUTE CARE | Facility: EXTERNAL LOCATION | Age: 79
End: 2024-04-11
Payer: COMMERCIAL

## 2024-04-11 DIAGNOSIS — Z00.00 ANNUAL PHYSICAL EXAM: Primary | ICD-10-CM

## 2024-04-11 DIAGNOSIS — E03.9 ACQUIRED HYPOTHYROIDISM: ICD-10-CM

## 2024-04-11 DIAGNOSIS — I50.9 CHRONIC CONGESTIVE HEART FAILURE, UNSPECIFIED HEART FAILURE TYPE (MULTI): ICD-10-CM

## 2024-04-11 DIAGNOSIS — F31.32 BIPOLAR AFFECTIVE DISORDER, CURRENTLY DEPRESSED, MODERATE (MULTI): ICD-10-CM

## 2024-04-11 PROCEDURE — G0439 PPPS, SUBSEQ VISIT: HCPCS | Performed by: INTERNAL MEDICINE

## 2024-04-11 ASSESSMENT — ENCOUNTER SYMPTOMS
COUGH: 0
NAUSEA: 0
CHILLS: 0
VOMITING: 0
HEADACHES: 0
DIARRHEA: 0
SHORTNESS OF BREATH: 0
APPETITE CHANGE: 0
ABDOMINAL PAIN: 0
FEVER: 0

## 2024-04-11 NOTE — PROGRESS NOTES
MEDICARE WELLNESS EXAM      Mariella Cuello is a 78 y.o. female that is presenting today for No chief complaint on file..    Assessment/Plan    Diagnoses and all orders for this visit:  Annual physical exam  Chronic congestive heart failure, unspecified heart failure type (CMS/HCC)  Acquired hypothyroidism  Bipolar affective disorder, currently depressed, moderate (CMS/HCC)    Hypothyroidism - cont levothyroxine and monitoring TSH  CHF - lasix, stable, euvolemic clinically  Psych - Same meds, psych does see her    ADVANCED CARE PLANNING  Advanced Care Planning was discussed with patient:  The patient has an active advanced care plan on file. The patient has an active surrogate decision-maker on file.  Encouraged the patient to confirm that Living Will and Healthcare Power of  (HCPoA) are accurate and up to date.  Encouraged the patient to confirm that our office be provided a copy of any documentation in the event that anything changes.    ACTIVITIES OF DAILY LIVING  Basic ADLs:  Bathing: Completely Dependent, Dressing: Completely Dependent, Toileting: Completely Dependent, Transferring: Completely Dependent, Continence: Completely Dependent, Feeding: Completely Dependent.    Instrumental ADLs:  Ability to use phone: Completely Dependent, Shopping: Completely Dependent, Cooking: Completely Dependent, House-keeping: Completely Dependent, Laundry: Completely Dependent, Transportation: Completely Dependent, Medication Management: Completely Dependent, Finance Management: Completely Dependent.    Subjective   HPI  This patient resides at a long-term care facility.  This is an annual wellness exam.  The patient's chart, medication and other orders were reviewed in detail.  The patient's medical and functional condition has been discussed with the staff at the facility and any relative physical, occupational therapy or speech notes over the last several months have been reviewed.  CODE STATUS orders have been  reviewed and revised if needed.  Eitel signs have been reviewed.  All pertinent laboratory studies have been reviewed as well.    Review of Systems   Constitutional:  Negative for appetite change, chills and fever.   Respiratory:  Negative for cough and shortness of breath.    Cardiovascular:  Negative for chest pain.   Gastrointestinal:  Negative for abdominal pain, diarrhea, nausea and vomiting.   Neurological:  Negative for headaches.   All other systems reviewed and are negative.    Objective   There were no vitals filed for this visit.   There is no height or weight on file to calculate BMI.  Blood pressure 114/59, heart rate 77, respirations 18, pulse ox 96% on room air, temperature 97.3 °F, weight 192.6 pounds  Physical Exam  Vitals reviewed.   Constitutional:       General: She is not in acute distress.     Appearance: She is not toxic-appearing.   HENT:      Head: Normocephalic and atraumatic.      Mouth/Throat:      Mouth: Mucous membranes are moist.   Eyes:      Pupils: Pupils are equal, round, and reactive to light.   Cardiovascular:      Rate and Rhythm: Normal rate and regular rhythm.      Heart sounds: No murmur heard.  Pulmonary:      Breath sounds: Normal breath sounds. No wheezing, rhonchi or rales.   Abdominal:      General: There is no distension.      Palpations: Abdomen is soft.   Musculoskeletal:      Right lower leg: No edema.      Left lower leg: No edema.   Neurological:      General: No focal deficit present.      Mental Status: She is alert and oriented to person, place, and time.       Diagnostic Results   Lab Results   Component Value Date    GLUCOSE 88 04/08/2024    CALCIUM 9.2 04/08/2024     04/08/2024    K 4.5 04/08/2024    CO2 25 04/08/2024     (H) 04/08/2024    BUN 10 04/08/2024    CREATININE 1.00 04/08/2024     Lab Results   Component Value Date    ALT 15 12/19/2023    AST 26 12/19/2023    ALKPHOS 89 12/19/2023    BILITOT 0.5 12/19/2023     Lab Results   Component  "Value Date    WBC 7.1 04/08/2024    HGB 12.9 04/08/2024    HCT 40.1 04/08/2024     04/08/2024     04/08/2024     Lab Results   Component Value Date    CHOL 138 10/02/2023    CHOL 111 (L) 04/03/2023    CHOL 63 (L) 10/03/2022     Lab Results   Component Value Date    HDL 52.8 10/02/2023    HDL 41 (L) 04/03/2023    HDL 14 (L) 10/03/2022     Lab Results   Component Value Date    LDLCALC 66 (L) 10/02/2023    LDLCALC 50 (L) 04/03/2023    LDLCALC 20 (L) 10/03/2022     Lab Results   Component Value Date    TRIG 96 10/02/2023    TRIG 102 04/03/2023    TRIG 147 10/03/2022     No components found for: \"CHOLHDL\"  Lab Results   Component Value Date    HGBA1C 4.9 03/01/2022     Other labs not included in the list above reviewed either before or during this encounter.    History   Past Medical History:   Diagnosis Date    Alzheimer disease (CMS/[facility])     Anemia     Atherosclerotic heart disease of native coronary artery without angina pectoris 05/12/2021    Atherosclerosis of coronary artery without angina pectoris, unspecified vessel or lesion type, unspecified whether native or transplanted heart    Bipolar disorder (CMS/[facility])     Cardiomyopathy (CMS/[facility])     Chronic systolic (congestive) heart failure (CMS/[facility]) 05/12/2021    Chronic systolic heart failure, ACC/AHA stage C    Depression     Dysphagia     Gastritis     History of COVID-19     Hyperlipidemia     Hypertension     Hypothyroidism     Major depressive disorder, recurrent, unspecified (CMS/HCC) 05/12/2021    Major depression, recurrent    Obstipation     Respiratory failure (CMS/[facility])     Schizoaffective disorder (CMS/[facility])     Spinal stenosis     Stroke (CMS/[facility])     Systolic heart failure (CMS/[facility])      Past Surgical History:   Procedure Laterality Date    CATARACT EXTRACTION  02/29/2016    Cataract Surgery    CORONARY ARTERY BYPASS GRAFT      MR HEAD ANGIO WO IV CONTRAST  01/22/2014    MR HEAD ANGIO WO IV CONTRAST [facility]    TONSILLECTOMY  " 02/29/2016    Tonsillectomy    TOTAL ABDOMINAL HYSTERECTOMY  02/29/2016    Total Abdominal Hysterectomy     No family history on file.  Social History     Socioeconomic History    Marital status:      Spouse name: Not on file    Number of children: Not on file    Years of education: Not on file    Highest education level: Not on file   Occupational History    Not on file   Tobacco Use    Smoking status: Never     Passive exposure: Never    Smokeless tobacco: Not on file   Vaping Use    Vaping status: Unknown   Substance and Sexual Activity    Alcohol use: Not on file    Drug use: Not on file    Sexual activity: Not on file   Other Topics Concern    Not on file   Social History Narrative    Not on file     Social Determinants of Health     Financial Resource Strain: Patient Unable To Answer (12/19/2023)    Overall Financial Resource Strain (CARDIA)     Difficulty of Paying Living Expenses: Patient unable to answer   Food Insecurity: Not on file   Transportation Needs: Patient Unable To Answer (12/19/2023)    PRAPARE - Transportation     Lack of Transportation (Medical): Patient unable to answer     Lack of Transportation (Non-Medical): Patient unable to answer   Physical Activity: Not on file   Stress: Not on file   Social Connections: Not on file   Intimate Partner Violence: Not on file   Housing Stability: Patient Unable To Answer (12/19/2023)    Housing Stability Vital Sign     Unable to Pay for Housing in the Last Year: Patient unable to answer     Number of Places Lived in the Last Year: 1     Unstable Housing in the Last Year: Patient unable to answer     No Known Allergies  Current Outpatient Medications on File Prior to Visit   Medication Sig Dispense Refill    acetaminophen (Tylenol) 325 mg tablet Take 2 tablets (650 mg) by mouth every 6 hours if needed for mild pain (1 - 3) or fever (temp greater than 38.0 C).      acetaminophen (Tylenol) 650 mg suppository Insert 1 suppository (650 mg) into the  rectum every 6 hours if needed for mild pain (1 - 3) or fever (temp greater than 38.0 C).      albuterol sulfate (Proair Digihaler) 90 mcg/actuation aero powdr breath act w/sensor inhaler Inhale 2 puffs every 6 hours if needed for wheezing.      alum-mag hydroxide-simeth (Maalox MAX) 400-400-40 mg/5 mL suspension Take 30 mL by mouth every 4 hours if needed for indigestion or heartburn.      ARIPiprazole (Abilify) 2 mg tablet Take 1 tablet (2 mg) by mouth once daily.      ARIPiprazole (Abilify) 5 mg tablet Take 1 tablet (5 mg) by mouth once daily.      aspirin 81 mg EC tablet Take 1 tablet (81 mg) by mouth once daily.      atorvastatin (Lipitor) 20 mg tablet Take 1 tablet (20 mg) by mouth once daily.      bisacodyl (Dulcolax) 10 mg suppository Insert 1 suppository (10 mg) into the rectum once daily as needed for constipation.      bisoprolol (Zebeta) 5 mg tablet Take 0.5 tablets (2.5 mg) by mouth 2 times a day.      busPIRone (Buspar) 5 mg tablet Take 1 tablet (5 mg) by mouth 3 times a day.      cholecalciferol (Vitamin D-3) 5,000 Units tablet Take 1 tablet (5,000 Units) by mouth once daily.      diclofenac epolamine 1.3 % patch Place 1 patch on the skin every 12 hours.      diclofenac sodium (Voltaren) 1 % gel gel Apply 1 Application topically every 8 hours if needed (arthritis pain).      docusate sodium (Colace) 100 mg capsule Take 1 capsule (100 mg) by mouth 2 times a day.      furosemide (Lasix) 20 mg tablet Take 1 tablet (20 mg) by mouth once daily. Every Friday      guaiFENesin (Robitussin) 100 mg/5 mL syrup Take 10 mL (200 mg) by mouth every 4 hours if needed for cough.      lamoTRIgine (LaMICtal) 100 mg tablet Take 1 tablet (100 mg) by mouth 2 times a day.      levothyroxine (Synthroid, Levoxyl) 100 mcg tablet Take 112 mcg by mouth once daily.      loperamide (Imodium A-D) 2 mg tablet Take 1 tablet (2 mg) by mouth every 3 hours if needed for diarrhea.      magnesium hydroxide (Milk of Magnesia) 2,400 mg/10  mL suspension suspension Take 10 mL by mouth once daily as needed for constipation.      melatonin 3 mg capsule Take 6 mg by mouth once daily.      ondansetron (Zofran) 4 mg tablet Take 1 tablet (4 mg) by mouth every 8 hours if needed for nausea or vomiting.      potassium chloride CR 20 mEq ER tablet Take 1 tablet (20 mEq) by mouth once daily.      sennosides (Senokot) 8.6 mg tablet Take 2 tablets (17.2 mg) by mouth once daily.      sodium phosphates (Fleets) 19-7 gram/118 mL enema enema Insert 118 mL (1 enema) into the rectum once daily as needed for constipation.      topiramate (Topamax) 50 mg tablet Take 1 tablet (50 mg) by mouth 2 times a day.      traMADol (Ultram) 50 mg tablet Take 1 tablet (50 mg) by mouth 2 times a day as needed for severe pain (7 - 10). 60 tablet 2    traZODone (Desyrel) 50 mg tablet Take 0.5 tablets (25 mg) by mouth once daily.      venlafaxine XR (Effexor-XR) 75 mg 24 hr capsule Take 3 capsules (225 mg) by mouth once daily.       No current facility-administered medications on file prior to visit.     Immunization History   Administered Date(s) Administered    Pfizer Gray Cap SARS-CoV-2 05/10/2022    Pfizer Purple Cap SARS-CoV-2 02/02/2021, 03/16/2021, 10/29/2021     Patient's medical history was reviewed and updated either before or during this encounter.     Alirio Augustine MD

## 2024-04-11 NOTE — LETTER
Patient: Mariella Cuello  : 1945    Encounter Date: 2024    MEDICARE WELLNESS EXAM      Mariella Cuello is a 78 y.o. female that is presenting today for No chief complaint on file..    Assessment/Plan   Diagnoses and all orders for this visit:  Annual physical exam  Chronic congestive heart failure, unspecified heart failure type (CMS/HCC)  Acquired hypothyroidism  Bipolar affective disorder, currently depressed, moderate (CMS/HCC)    Hypothyroidism - cont levothyroxine and monitoring TSH  CHF - lasix, stable, euvolemic clinically  Psych - Same meds, psych does see her    ADVANCED CARE PLANNING  Advanced Care Planning was discussed with patient:  The patient has an active advanced care plan on file. The patient has an active surrogate decision-maker on file.  Encouraged the patient to confirm that Living Will and Healthcare Power of  (HCPoA) are accurate and up to date.  Encouraged the patient to confirm that our office be provided a copy of any documentation in the event that anything changes.    ACTIVITIES OF DAILY LIVING  Basic ADLs:  Bathing: Completely Dependent, Dressing: Completely Dependent, Toileting: Completely Dependent, Transferring: Completely Dependent, Continence: Completely Dependent, Feeding: Completely Dependent.    Instrumental ADLs:  Ability to use phone: Completely Dependent, Shopping: Completely Dependent, Cooking: Completely Dependent, House-keeping: Completely Dependent, Laundry: Completely Dependent, Transportation: Completely Dependent, Medication Management: Completely Dependent, Finance Management: Completely Dependent.    Subjective  HPI  This patient resides at a long-term care facility.  This is an annual wellness exam.  The patient's chart, medication and other orders were reviewed in detail.  The patient's medical and functional condition has been discussed with the staff at the facility and any relative physical, occupational therapy or speech notes over the last  several months have been reviewed.  CODE STATUS orders have been reviewed and revised if needed.  Eitel signs have been reviewed.  All pertinent laboratory studies have been reviewed as well.    Review of Systems   Constitutional:  Negative for appetite change, chills and fever.   Respiratory:  Negative for cough and shortness of breath.    Cardiovascular:  Negative for chest pain.   Gastrointestinal:  Negative for abdominal pain, diarrhea, nausea and vomiting.   Neurological:  Negative for headaches.   All other systems reviewed and are negative.    Objective  There were no vitals filed for this visit.   There is no height or weight on file to calculate BMI.  Blood pressure 114/59, heart rate 77, respirations 18, pulse ox 96% on room air, temperature 97.3 °F, weight 192.6 pounds  Physical Exam  Vitals reviewed.   Constitutional:       General: She is not in acute distress.     Appearance: She is not toxic-appearing.   HENT:      Head: Normocephalic and atraumatic.      Mouth/Throat:      Mouth: Mucous membranes are moist.   Eyes:      Pupils: Pupils are equal, round, and reactive to light.   Cardiovascular:      Rate and Rhythm: Normal rate and regular rhythm.      Heart sounds: No murmur heard.  Pulmonary:      Breath sounds: Normal breath sounds. No wheezing, rhonchi or rales.   Abdominal:      General: There is no distension.      Palpations: Abdomen is soft.   Musculoskeletal:      Right lower leg: No edema.      Left lower leg: No edema.   Neurological:      General: No focal deficit present.      Mental Status: She is alert and oriented to person, place, and time.       Diagnostic Results  Lab Results   Component Value Date    GLUCOSE 88 04/08/2024    CALCIUM 9.2 04/08/2024     04/08/2024    K 4.5 04/08/2024    CO2 25 04/08/2024     (H) 04/08/2024    BUN 10 04/08/2024    CREATININE 1.00 04/08/2024     Lab Results   Component Value Date    ALT 15 12/19/2023    AST 26 12/19/2023    ALKPHOS 89  "12/19/2023    BILITOT 0.5 12/19/2023     Lab Results   Component Value Date    WBC 7.1 04/08/2024    HGB 12.9 04/08/2024    HCT 40.1 04/08/2024     04/08/2024     04/08/2024     Lab Results   Component Value Date    CHOL 138 10/02/2023    CHOL 111 (L) 04/03/2023    CHOL 63 (L) 10/03/2022     Lab Results   Component Value Date    HDL 52.8 10/02/2023    HDL 41 (L) 04/03/2023    HDL 14 (L) 10/03/2022     Lab Results   Component Value Date    LDLCALC 66 (L) 10/02/2023    LDLCALC 50 (L) 04/03/2023    LDLCALC 20 (L) 10/03/2022     Lab Results   Component Value Date    TRIG 96 10/02/2023    TRIG 102 04/03/2023    TRIG 147 10/03/2022     No components found for: \"CHOLHDL\"  Lab Results   Component Value Date    HGBA1C 4.9 03/01/2022     Other labs not included in the list above reviewed either before or during this encounter.    History  Past Medical History:   Diagnosis Date   • Alzheimer disease (CMS/[facility])    • Anemia    • Atherosclerotic heart disease of native coronary artery without angina pectoris 05/12/2021    Atherosclerosis of coronary artery without angina pectoris, unspecified vessel or lesion type, unspecified whether native or transplanted heart   • Bipolar disorder (CMS/[facility])    • Cardiomyopathy (CMS/[facility])    • Chronic systolic (congestive) heart failure (CMS/[facility]) 05/12/2021    Chronic systolic heart failure, ACC/AHA stage C   • Depression    • Dysphagia    • Gastritis    • History of COVID-19    • Hyperlipidemia    • Hypertension    • Hypothyroidism    • Major depressive disorder, recurrent, unspecified (CMS/HCC) 05/12/2021    Major depression, recurrent   • Obstipation    • Respiratory failure (CMS/[facility])    • Schizoaffective disorder (CMS/[facility])    • Spinal stenosis    • Stroke (CMS/[facility])    • Systolic heart failure (CMS/[facility])      Past Surgical History:   Procedure Laterality Date   • CATARACT EXTRACTION  02/29/2016    Cataract Surgery   • CORONARY ARTERY BYPASS GRAFT     • MR HEAD ANGIO WO IV CONTRAST "  01/22/2014    MR HEAD ANGIO WO IV CONTRAST [facility]   • TONSILLECTOMY  02/29/2016    Tonsillectomy   • TOTAL ABDOMINAL HYSTERECTOMY  02/29/2016    Total Abdominal Hysterectomy     No family history on file.  Social History     Socioeconomic History   • Marital status:      Spouse name: Not on file   • Number of children: Not on file   • Years of education: Not on file   • Highest education level: Not on file   Occupational History   • Not on file   Tobacco Use   • Smoking status: Never     Passive exposure: Never   • Smokeless tobacco: Not on file   Vaping Use   • Vaping status: Unknown   Substance and Sexual Activity   • Alcohol use: Not on file   • Drug use: Not on file   • Sexual activity: Not on file   Other Topics Concern   • Not on file   Social History Narrative   • Not on file     Social Determinants of Health     Financial Resource Strain: Patient Unable To Answer (12/19/2023)    Overall Financial Resource Strain (CARDIA)    • Difficulty of Paying Living Expenses: Patient unable to answer   Food Insecurity: Not on file   Transportation Needs: Patient Unable To Answer (12/19/2023)    PRAPARE - Transportation    • Lack of Transportation (Medical): Patient unable to answer    • Lack of Transportation (Non-Medical): Patient unable to answer   Physical Activity: Not on file   Stress: Not on file   Social Connections: Not on file   Intimate Partner Violence: Not on file   Housing Stability: Patient Unable To Answer (12/19/2023)    Housing Stability Vital Sign    • Unable to Pay for Housing in the Last Year: Patient unable to answer    • Number of Places Lived in the Last Year: 1    • Unstable Housing in the Last Year: Patient unable to answer     No Known Allergies  Current Outpatient Medications on File Prior to Visit   Medication Sig Dispense Refill   • acetaminophen (Tylenol) 325 mg tablet Take 2 tablets (650 mg) by mouth every 6 hours if needed for mild pain (1 - 3) or fever (temp greater  than 38.0 C).     • acetaminophen (Tylenol) 650 mg suppository Insert 1 suppository (650 mg) into the rectum every 6 hours if needed for mild pain (1 - 3) or fever (temp greater than 38.0 C).     • albuterol sulfate (Proair Digihaler) 90 mcg/actuation aero powdr breath act w/sensor inhaler Inhale 2 puffs every 6 hours if needed for wheezing.     • alum-mag hydroxide-simeth (Maalox MAX) 400-400-40 mg/5 mL suspension Take 30 mL by mouth every 4 hours if needed for indigestion or heartburn.     • ARIPiprazole (Abilify) 2 mg tablet Take 1 tablet (2 mg) by mouth once daily.     • ARIPiprazole (Abilify) 5 mg tablet Take 1 tablet (5 mg) by mouth once daily.     • aspirin 81 mg EC tablet Take 1 tablet (81 mg) by mouth once daily.     • atorvastatin (Lipitor) 20 mg tablet Take 1 tablet (20 mg) by mouth once daily.     • bisacodyl (Dulcolax) 10 mg suppository Insert 1 suppository (10 mg) into the rectum once daily as needed for constipation.     • bisoprolol (Zebeta) 5 mg tablet Take 0.5 tablets (2.5 mg) by mouth 2 times a day.     • busPIRone (Buspar) 5 mg tablet Take 1 tablet (5 mg) by mouth 3 times a day.     • cholecalciferol (Vitamin D-3) 5,000 Units tablet Take 1 tablet (5,000 Units) by mouth once daily.     • diclofenac epolamine 1.3 % patch Place 1 patch on the skin every 12 hours.     • diclofenac sodium (Voltaren) 1 % gel gel Apply 1 Application topically every 8 hours if needed (arthritis pain).     • docusate sodium (Colace) 100 mg capsule Take 1 capsule (100 mg) by mouth 2 times a day.     • furosemide (Lasix) 20 mg tablet Take 1 tablet (20 mg) by mouth once daily. Every Friday     • guaiFENesin (Robitussin) 100 mg/5 mL syrup Take 10 mL (200 mg) by mouth every 4 hours if needed for cough.     • lamoTRIgine (LaMICtal) 100 mg tablet Take 1 tablet (100 mg) by mouth 2 times a day.     • levothyroxine (Synthroid, Levoxyl) 100 mcg tablet Take 112 mcg by mouth once daily.     • loperamide (Imodium A-D) 2 mg tablet Take  1 tablet (2 mg) by mouth every 3 hours if needed for diarrhea.     • magnesium hydroxide (Milk of Magnesia) 2,400 mg/10 mL suspension suspension Take 10 mL by mouth once daily as needed for constipation.     • melatonin 3 mg capsule Take 6 mg by mouth once daily.     • ondansetron (Zofran) 4 mg tablet Take 1 tablet (4 mg) by mouth every 8 hours if needed for nausea or vomiting.     • potassium chloride CR 20 mEq ER tablet Take 1 tablet (20 mEq) by mouth once daily.     • sennosides (Senokot) 8.6 mg tablet Take 2 tablets (17.2 mg) by mouth once daily.     • sodium phosphates (Fleets) 19-7 gram/118 mL enema enema Insert 118 mL (1 enema) into the rectum once daily as needed for constipation.     • topiramate (Topamax) 50 mg tablet Take 1 tablet (50 mg) by mouth 2 times a day.     • traMADol (Ultram) 50 mg tablet Take 1 tablet (50 mg) by mouth 2 times a day as needed for severe pain (7 - 10). 60 tablet 2   • traZODone (Desyrel) 50 mg tablet Take 0.5 tablets (25 mg) by mouth once daily.     • venlafaxine XR (Effexor-XR) 75 mg 24 hr capsule Take 3 capsules (225 mg) by mouth once daily.       No current facility-administered medications on file prior to visit.     Immunization History   Administered Date(s) Administered   • Pfizer Gray Cap SARS-CoV-2 05/10/2022   • Pfizer Purple Cap SARS-CoV-2 02/02/2021, 03/16/2021, 10/29/2021     Patient's medical history was reviewed and updated either before or during this encounter.     Alirio Augustine MD      Electronically Signed By: Alirio Augustine MD   4/11/24 11:09 AM

## 2024-05-06 ENCOUNTER — LAB REQUISITION (OUTPATIENT)
Dept: LAB | Facility: HOSPITAL | Age: 79
End: 2024-05-06
Payer: COMMERCIAL

## 2024-05-06 DIAGNOSIS — J96.11 CHRONIC RESPIRATORY FAILURE WITH HYPOXIA (MULTI): ICD-10-CM

## 2024-05-06 LAB
ANION GAP SERPL CALC-SCNC: 10 MMOL/L
BUN SERPL-MCNC: 11 MG/DL (ref 8–25)
CALCIUM SERPL-MCNC: 9 MG/DL (ref 8.5–10.4)
CHLORIDE SERPL-SCNC: 109 MMOL/L (ref 97–107)
CO2 SERPL-SCNC: 22 MMOL/L (ref 24–31)
CREAT SERPL-MCNC: 1 MG/DL (ref 0.4–1.6)
EGFRCR SERPLBLD CKD-EPI 2021: 57 ML/MIN/1.73M*2
ERYTHROCYTE [DISTWIDTH] IN BLOOD BY AUTOMATED COUNT: 13.5 % (ref 11.5–14.5)
GLUCOSE SERPL-MCNC: 95 MG/DL (ref 65–99)
HCT VFR BLD AUTO: 40.1 % (ref 36–46)
HGB BLD-MCNC: 13 G/DL (ref 12–16)
MCH RBC QN AUTO: 31.9 PG (ref 26–34)
MCHC RBC AUTO-ENTMCNC: 32.4 G/DL (ref 32–36)
MCV RBC AUTO: 98 FL (ref 80–100)
NRBC BLD-RTO: 0 /100 WBCS (ref 0–0)
PLATELET # BLD AUTO: 237 X10*3/UL (ref 150–450)
POTASSIUM SERPL-SCNC: 4.2 MMOL/L (ref 3.4–5.1)
RBC # BLD AUTO: 4.08 X10*6/UL (ref 4–5.2)
SODIUM SERPL-SCNC: 141 MMOL/L (ref 133–145)
WBC # BLD AUTO: 7.4 X10*3/UL (ref 4.4–11.3)

## 2024-05-06 PROCEDURE — 82374 ASSAY BLOOD CARBON DIOXIDE: CPT | Mod: OUT | Performed by: INTERNAL MEDICINE

## 2024-05-06 PROCEDURE — 80048 BASIC METABOLIC PNL TOTAL CA: CPT | Mod: OUT | Performed by: INTERNAL MEDICINE

## 2024-05-06 PROCEDURE — 85027 COMPLETE CBC AUTOMATED: CPT | Mod: OUT | Performed by: INTERNAL MEDICINE

## 2024-05-30 ENCOUNTER — LAB REQUISITION (OUTPATIENT)
Dept: LAB | Facility: HOSPITAL | Age: 79
End: 2024-05-30
Payer: COMMERCIAL

## 2024-05-30 DIAGNOSIS — J96.11 CHRONIC RESPIRATORY FAILURE WITH HYPOXIA (MULTI): ICD-10-CM

## 2024-05-30 LAB
ANION GAP SERPL CALC-SCNC: 9 MMOL/L
BUN SERPL-MCNC: 8 MG/DL (ref 8–25)
CALCIUM SERPL-MCNC: 8.9 MG/DL (ref 8.5–10.4)
CHLORIDE SERPL-SCNC: 111 MMOL/L (ref 97–107)
CO2 SERPL-SCNC: 23 MMOL/L (ref 24–31)
CREAT SERPL-MCNC: 1 MG/DL (ref 0.4–1.6)
EGFRCR SERPLBLD CKD-EPI 2021: 57 ML/MIN/1.73M*2
ERYTHROCYTE [DISTWIDTH] IN BLOOD BY AUTOMATED COUNT: 13.6 % (ref 11.5–14.5)
GLUCOSE SERPL-MCNC: 88 MG/DL (ref 65–99)
HCT VFR BLD AUTO: 39.8 % (ref 36–46)
HGB BLD-MCNC: 12.5 G/DL (ref 12–16)
MCH RBC QN AUTO: 31.6 PG (ref 26–34)
MCHC RBC AUTO-ENTMCNC: 31.4 G/DL (ref 32–36)
MCV RBC AUTO: 101 FL (ref 80–100)
NRBC BLD-RTO: 0 /100 WBCS (ref 0–0)
PLATELET # BLD AUTO: 218 X10*3/UL (ref 150–450)
POTASSIUM SERPL-SCNC: 4.2 MMOL/L (ref 3.4–5.1)
RBC # BLD AUTO: 3.95 X10*6/UL (ref 4–5.2)
SODIUM SERPL-SCNC: 143 MMOL/L (ref 133–145)
URATE SERPL-MCNC: 5.6 MG/DL (ref 2.5–6.8)
WBC # BLD AUTO: 6.3 X10*3/UL (ref 4.4–11.3)

## 2024-05-30 PROCEDURE — 80048 BASIC METABOLIC PNL TOTAL CA: CPT | Mod: OUT | Performed by: INTERNAL MEDICINE

## 2024-05-30 PROCEDURE — 85027 COMPLETE CBC AUTOMATED: CPT | Mod: OUT | Performed by: INTERNAL MEDICINE

## 2024-05-30 PROCEDURE — 84550 ASSAY OF BLOOD/URIC ACID: CPT | Mod: OUT | Performed by: INTERNAL MEDICINE

## 2024-06-03 ENCOUNTER — LAB REQUISITION (OUTPATIENT)
Dept: LAB | Facility: HOSPITAL | Age: 79
End: 2024-06-03
Payer: COMMERCIAL

## 2024-06-03 DIAGNOSIS — J96.11 CHRONIC RESPIRATORY FAILURE WITH HYPOXIA (MULTI): ICD-10-CM

## 2024-06-03 LAB
ANION GAP SERPL CALC-SCNC: 10 MMOL/L
BUN SERPL-MCNC: 12 MG/DL (ref 8–25)
CALCIUM SERPL-MCNC: 8.9 MG/DL (ref 8.5–10.4)
CHLORIDE SERPL-SCNC: 109 MMOL/L (ref 97–107)
CO2 SERPL-SCNC: 23 MMOL/L (ref 24–31)
CREAT SERPL-MCNC: 1 MG/DL (ref 0.4–1.6)
EGFRCR SERPLBLD CKD-EPI 2021: 57 ML/MIN/1.73M*2
ERYTHROCYTE [DISTWIDTH] IN BLOOD BY AUTOMATED COUNT: 13.6 % (ref 11.5–14.5)
GLUCOSE SERPL-MCNC: 78 MG/DL (ref 65–99)
HCT VFR BLD AUTO: 42.1 % (ref 36–46)
HGB BLD-MCNC: 13.5 G/DL (ref 12–16)
MCH RBC QN AUTO: 31.6 PG (ref 26–34)
MCHC RBC AUTO-ENTMCNC: 32.1 G/DL (ref 32–36)
MCV RBC AUTO: 99 FL (ref 80–100)
NRBC BLD-RTO: 0 /100 WBCS (ref 0–0)
PLATELET # BLD AUTO: 240 X10*3/UL (ref 150–450)
POTASSIUM SERPL-SCNC: 3.9 MMOL/L (ref 3.4–5.1)
RBC # BLD AUTO: 4.27 X10*6/UL (ref 4–5.2)
SODIUM SERPL-SCNC: 142 MMOL/L (ref 133–145)
WBC # BLD AUTO: 7.4 X10*3/UL (ref 4.4–11.3)

## 2024-06-03 PROCEDURE — 80048 BASIC METABOLIC PNL TOTAL CA: CPT | Mod: OUT | Performed by: INTERNAL MEDICINE

## 2024-06-03 PROCEDURE — 85027 COMPLETE CBC AUTOMATED: CPT | Mod: OUT | Performed by: INTERNAL MEDICINE

## 2024-06-06 ENCOUNTER — LAB REQUISITION (OUTPATIENT)
Dept: LAB | Facility: HOSPITAL | Age: 79
End: 2024-06-06
Payer: COMMERCIAL

## 2024-06-06 DIAGNOSIS — J96.11 CHRONIC RESPIRATORY FAILURE WITH HYPOXIA (MULTI): ICD-10-CM

## 2024-06-06 LAB
ANION GAP SERPL CALC-SCNC: 6 MMOL/L
BUN SERPL-MCNC: 9 MG/DL (ref 8–25)
CALCIUM SERPL-MCNC: 9.3 MG/DL (ref 8.5–10.4)
CHLORIDE SERPL-SCNC: 109 MMOL/L (ref 97–107)
CO2 SERPL-SCNC: 26 MMOL/L (ref 24–31)
CREAT SERPL-MCNC: 1.1 MG/DL (ref 0.4–1.6)
EGFRCR SERPLBLD CKD-EPI 2021: 51 ML/MIN/1.73M*2
ERYTHROCYTE [DISTWIDTH] IN BLOOD BY AUTOMATED COUNT: 13.8 % (ref 11.5–14.5)
GLUCOSE SERPL-MCNC: 87 MG/DL (ref 65–99)
HCT VFR BLD AUTO: 39.9 % (ref 36–46)
HGB BLD-MCNC: 13.1 G/DL (ref 12–16)
MCH RBC QN AUTO: 32.6 PG (ref 26–34)
MCHC RBC AUTO-ENTMCNC: 32.8 G/DL (ref 32–36)
MCV RBC AUTO: 99 FL (ref 80–100)
NRBC BLD-RTO: 0 /100 WBCS (ref 0–0)
PLATELET # BLD AUTO: 212 X10*3/UL (ref 150–450)
POTASSIUM SERPL-SCNC: 4.3 MMOL/L (ref 3.4–5.1)
RBC # BLD AUTO: 4.02 X10*6/UL (ref 4–5.2)
SODIUM SERPL-SCNC: 141 MMOL/L (ref 133–145)
URATE SERPL-MCNC: 5.6 MG/DL (ref 2.5–6.8)
WBC # BLD AUTO: 6.5 X10*3/UL (ref 4.4–11.3)

## 2024-06-06 PROCEDURE — 85027 COMPLETE CBC AUTOMATED: CPT | Mod: OUT | Performed by: INTERNAL MEDICINE

## 2024-06-06 PROCEDURE — 80048 BASIC METABOLIC PNL TOTAL CA: CPT | Mod: OUT | Performed by: INTERNAL MEDICINE

## 2024-06-06 PROCEDURE — 84550 ASSAY OF BLOOD/URIC ACID: CPT | Mod: OUT | Performed by: INTERNAL MEDICINE

## 2024-06-17 ENCOUNTER — NURSING HOME VISIT (OUTPATIENT)
Dept: POST ACUTE CARE | Facility: EXTERNAL LOCATION | Age: 79
End: 2024-06-17
Payer: COMMERCIAL

## 2024-06-17 VITALS
SYSTOLIC BLOOD PRESSURE: 122 MMHG | TEMPERATURE: 98 F | DIASTOLIC BLOOD PRESSURE: 60 MMHG | HEART RATE: 70 BPM | BODY MASS INDEX: 32.25 KG/M2 | OXYGEN SATURATION: 97 % | WEIGHT: 188 LBS | RESPIRATION RATE: 18 BRPM

## 2024-06-17 DIAGNOSIS — F31.32 BIPOLAR AFFECTIVE DISORDER, CURRENTLY DEPRESSED, MODERATE (MULTI): ICD-10-CM

## 2024-06-17 DIAGNOSIS — E03.9 ACQUIRED HYPOTHYROIDISM: ICD-10-CM

## 2024-06-17 DIAGNOSIS — M19.90 OSTEOARTHRITIS, UNSPECIFIED OSTEOARTHRITIS TYPE, UNSPECIFIED SITE: ICD-10-CM

## 2024-06-17 DIAGNOSIS — I50.9 CHRONIC CONGESTIVE HEART FAILURE, UNSPECIFIED HEART FAILURE TYPE (MULTI): Primary | ICD-10-CM

## 2024-06-17 PROCEDURE — 99309 SBSQ NF CARE MODERATE MDM 30: CPT | Performed by: PHYSICIAN ASSISTANT

## 2024-06-17 ASSESSMENT — ENCOUNTER SYMPTOMS
HEMATURIA: 0
WHEEZING: 0
DIARRHEA: 0
FLANK PAIN: 0
NAUSEA: 0
DIZZINESS: 0
CHILLS: 0
CONSTIPATION: 0
ABDOMINAL PAIN: 0
TREMORS: 0
CONFUSION: 0
APPETITE CHANGE: 0
WEAKNESS: 0
COUGH: 0
SHORTNESS OF BREATH: 0
VOMITING: 0
FEVER: 0
HEADACHES: 0
FREQUENCY: 0
NERVOUS/ANXIOUS: 0
DYSURIA: 0

## 2024-06-17 NOTE — PROGRESS NOTES
Subjective   [id number] : Mariella Cuello is a 79 y.o. female LTC resident at [facility].   HPI  Pt  with h/o CHF, hypothyroid and bipolar disorder seen in routine follow up.   Pt seen while resting in bed.  She is alert, pleasant and interactive.   Nursing reports that she has been taking tramadol for joint pains.   Pt states that she had pain in her back and legs.  The tramadol has been effective for her pain and nursing notes that she has been getting out of bed more.  She was treated for gout with medrol dose kiera a few weeks ago and symptom have resolved.  Pt continues to take ativan for her anxiety symptoms.  Ativan has been effective at managing her symptoms.  She is followed by psych. She reports that she is feeling well.  She offers no new complaints.  No shortness of breath or cough.  She has been eating and drinking well.  Her weight is stable.  She has no lower leg edema.   Code status is DNR- CCA.  Review of Systems   Constitutional:  Negative for appetite change, chills and fever.   Respiratory:  Negative for cough, shortness of breath and wheezing.    Cardiovascular:  Negative for chest pain and leg swelling.   Gastrointestinal:  Negative for abdominal pain, constipation, diarrhea, nausea and vomiting.   Genitourinary:  Negative for dysuria, flank pain, frequency and hematuria.   Neurological:  Negative for dizziness, tremors, weakness and headaches.   Psychiatric/Behavioral:  Negative for confusion. The patient is not nervous/anxious.    All other systems reviewed and are negative.      Objective   /60   Pulse 70   Temp 36.7 °C (98 °F)   Resp 18   Wt 85.3 kg (188 lb)   SpO2 97%   BMI 32.25 kg/m²    Physical Exam  Constitutional:       General: She is not in acute distress.  Eyes:      Conjunctiva/sclera: Conjunctivae normal.      Pupils: Pupils are equal, round, and reactive to light.   Cardiovascular:      Rate and Rhythm: Normal rate and regular rhythm.      Heart sounds: No murmur  "heard.  Pulmonary:      Effort: Pulmonary effort is normal.      Breath sounds: No wheezing, rhonchi or rales.   Abdominal:      General: Abdomen is flat. Bowel sounds are normal. There is no distension.      Palpations: Abdomen is soft. There is no mass.      Tenderness: There is no abdominal tenderness.   Musculoskeletal:         General: No swelling. Normal range of motion.   Skin:     General: Skin is warm and dry.      Findings: No rash.   Neurological:      General: No focal deficit present.      Mental Status: She is alert and oriented to person, place, and time. Mental status is at baseline.     No lab exists for component: \"CBC BMP\"  Assessment/Plan   Chronic congestive heart failure (CMS/HCC) I50.9        Continue lasix.  Stable.   Monitor weights and labs.            Acquired hypothyroidism E03.9       Synthroid 125mcg daily.   Pt needs TSH.   Lab ordered for AM.            Bipolar affective disorder, currently depressed, moderate (CMS/HCC) F31.32       Continue current meds.  Pt follows with psych.  Continues on lorazepam prn for anxiety symptoms.   Well controlled with current meds.            Osteoarthritis:   pain management with tylenol and she does take tramadol prn.   Recently treated with medrol dose kiera for possible gout flare - uric acid was in normal range.  Symptom have resolved.          Time spent: 15 min in review of chart, labs and orders, consultation with pt and documentation.   "

## 2024-06-17 NOTE — LETTER
Patient: Mariella Cuello  : 1945    Encounter Date: 2024      Subjective   [id number] : Mariella Cuello is a 79 y.o. female LTC resident at [facility].   HPI  Pt  with h/o CHF, hypothyroid and bipolar disorder seen in routine follow up.   Pt seen while resting in bed.  She is alert, pleasant and interactive.   Nursing reports that she has been taking tramadol for joint pains.   Pt states that she had pain in her back and legs.  The tramadol has been effective for her pain and nursing notes that she has been getting out of bed more.  She was treated for gout with medrol dose kiera a few weeks ago and symptom have resolved.  Pt continues to take ativan for her anxiety symptoms.  Ativan has been effective at managing her symptoms.  She is followed by psych. She reports that she is feeling well.  She offers no new complaints.  No shortness of breath or cough.  She has been eating and drinking well.  Her weight is stable.  She has no lower leg edema.   Code status is DNR- CCA.  Review of Systems   Constitutional:  Negative for appetite change, chills and fever.   Respiratory:  Negative for cough, shortness of breath and wheezing.    Cardiovascular:  Negative for chest pain and leg swelling.   Gastrointestinal:  Negative for abdominal pain, constipation, diarrhea, nausea and vomiting.   Genitourinary:  Negative for dysuria, flank pain, frequency and hematuria.   Neurological:  Negative for dizziness, tremors, weakness and headaches.   Psychiatric/Behavioral:  Negative for confusion. The patient is not nervous/anxious.    All other systems reviewed and are negative.      Objective   /60   Pulse 70   Temp 36.7 °C (98 °F)   Resp 18   Wt 85.3 kg (188 lb)   SpO2 97%   BMI 32.25 kg/m²    Physical Exam  Constitutional:       General: She is not in acute distress.  Eyes:      Conjunctiva/sclera: Conjunctivae normal.      Pupils: Pupils are equal, round, and reactive to light.   Cardiovascular:      Rate and  "Rhythm: Normal rate and regular rhythm.      Heart sounds: No murmur heard.  Pulmonary:      Effort: Pulmonary effort is normal.      Breath sounds: No wheezing, rhonchi or rales.   Abdominal:      General: Abdomen is flat. Bowel sounds are normal. There is no distension.      Palpations: Abdomen is soft. There is no mass.      Tenderness: There is no abdominal tenderness.   Musculoskeletal:         General: No swelling. Normal range of motion.   Skin:     General: Skin is warm and dry.      Findings: No rash.   Neurological:      General: No focal deficit present.      Mental Status: She is alert and oriented to person, place, and time. Mental status is at baseline.     No lab exists for component: \"CBC BMP\"  Assessment/Plan   Chronic congestive heart failure (CMS/HCC) I50.9        Continue lasix.  Stable.   Monitor weights and labs.            Acquired hypothyroidism E03.9       Synthroid 125mcg daily.   Pt needs TSH.   Lab ordered for AM.            Bipolar affective disorder, currently depressed, moderate (CMS/HCC) F31.32       Continue current meds.  Pt follows with psych.  Continues on lorazepam prn for anxiety symptoms.   Well controlled with current meds.            Osteoarthritis:   pain management with tylenol and she does take tramadol prn.   Recently treated with medrol dose kiera for possible gout flare - uric acid was in normal range.  Symptom have resolved.          Time spent: 15 min in review of chart, labs and orders, consultation with pt and documentation.       Electronically Signed By: Ana Paula Paul PA-C   6/17/24  7:53 PM  "

## 2024-06-18 ENCOUNTER — LAB REQUISITION (OUTPATIENT)
Dept: LAB | Facility: HOSPITAL | Age: 79
End: 2024-06-18
Payer: COMMERCIAL

## 2024-06-18 DIAGNOSIS — E03.9 HYPOTHYROIDISM, UNSPECIFIED: ICD-10-CM

## 2024-06-18 LAB
T4 FREE SERPL-MCNC: 1.2 NG/DL (ref 0.9–1.7)
TSH SERPL DL<=0.05 MIU/L-ACNC: 4.49 MIU/L (ref 0.27–4.2)

## 2024-06-18 PROCEDURE — 84443 ASSAY THYROID STIM HORMONE: CPT | Mod: OUT | Performed by: INTERNAL MEDICINE

## 2024-06-18 PROCEDURE — 84439 ASSAY OF FREE THYROXINE: CPT | Mod: OUT | Performed by: INTERNAL MEDICINE

## 2024-06-24 DIAGNOSIS — G89.29 OTHER CHRONIC PAIN: ICD-10-CM

## 2024-06-24 RX ORDER — TRAMADOL HYDROCHLORIDE 50 MG/1
50 TABLET ORAL 2 TIMES DAILY PRN
Qty: 14 TABLET | Refills: 0 | Status: SHIPPED | OUTPATIENT
Start: 2024-06-24 | End: 2024-07-01

## 2024-06-26 PROCEDURE — 87086 URINE CULTURE/COLONY COUNT: CPT | Mod: OUT,TRILAB,WESLAB | Performed by: INTERNAL MEDICINE

## 2024-06-26 PROCEDURE — 81001 URINALYSIS AUTO W/SCOPE: CPT | Mod: OUT | Performed by: INTERNAL MEDICINE

## 2024-06-27 ENCOUNTER — LAB REQUISITION (OUTPATIENT)
Dept: LAB | Facility: HOSPITAL | Age: 79
End: 2024-06-27
Payer: COMMERCIAL

## 2024-06-27 DIAGNOSIS — R41.82 ALTERED MENTAL STATUS, UNSPECIFIED: ICD-10-CM

## 2024-06-27 LAB
APPEARANCE UR: ABNORMAL
BILIRUB UR STRIP.AUTO-MCNC: NEGATIVE MG/DL
CAOX CRY #/AREA UR COMP ASSIST: ABNORMAL /HPF
COLOR UR: YELLOW
GLUCOSE UR STRIP.AUTO-MCNC: NORMAL MG/DL
HOLD SPECIMEN: NORMAL
HYALINE CASTS #/AREA URNS AUTO: ABNORMAL /LPF
KETONES UR STRIP.AUTO-MCNC: NEGATIVE MG/DL
LEUKOCYTE ESTERASE UR QL STRIP.AUTO: ABNORMAL
MUCOUS THREADS #/AREA URNS AUTO: ABNORMAL /LPF
NITRITE UR QL STRIP.AUTO: NEGATIVE
PH UR STRIP.AUTO: 6.5 [PH]
PROT UR STRIP.AUTO-MCNC: NEGATIVE MG/DL
RBC # UR STRIP.AUTO: NEGATIVE /UL
RBC #/AREA URNS AUTO: ABNORMAL /HPF
SP GR UR STRIP.AUTO: 1.02
SQUAMOUS #/AREA URNS AUTO: ABNORMAL /HPF
UROBILINOGEN UR STRIP.AUTO-MCNC: ABNORMAL MG/DL
WBC #/AREA URNS AUTO: ABNORMAL /HPF

## 2024-06-29 LAB — BACTERIA UR CULT: ABNORMAL

## 2024-07-02 ENCOUNTER — LAB REQUISITION (OUTPATIENT)
Dept: LAB | Facility: HOSPITAL | Age: 79
End: 2024-07-02
Payer: COMMERCIAL

## 2024-07-02 DIAGNOSIS — J96.11 CHRONIC RESPIRATORY FAILURE WITH HYPOXIA (MULTI): ICD-10-CM

## 2024-07-02 LAB
ANION GAP SERPL CALC-SCNC: 12 MMOL/L
BUN SERPL-MCNC: 10 MG/DL (ref 8–25)
CALCIUM SERPL-MCNC: 9.6 MG/DL (ref 8.5–10.4)
CHLORIDE SERPL-SCNC: 106 MMOL/L (ref 97–107)
CO2 SERPL-SCNC: 21 MMOL/L (ref 24–31)
CREAT SERPL-MCNC: 1.1 MG/DL (ref 0.4–1.6)
EGFRCR SERPLBLD CKD-EPI 2021: 51 ML/MIN/1.73M*2
ERYTHROCYTE [DISTWIDTH] IN BLOOD BY AUTOMATED COUNT: 13 % (ref 11.5–14.5)
GLUCOSE SERPL-MCNC: 94 MG/DL (ref 65–99)
HCT VFR BLD AUTO: 42.7 % (ref 36–46)
HGB BLD-MCNC: 14.1 G/DL (ref 12–16)
MCH RBC QN AUTO: 32.3 PG (ref 26–34)
MCHC RBC AUTO-ENTMCNC: 33 G/DL (ref 32–36)
MCV RBC AUTO: 98 FL (ref 80–100)
NRBC BLD-RTO: 0 /100 WBCS (ref 0–0)
PLATELET # BLD AUTO: 236 X10*3/UL (ref 150–450)
POTASSIUM SERPL-SCNC: 4.2 MMOL/L (ref 3.4–5.1)
RBC # BLD AUTO: 4.37 X10*6/UL (ref 4–5.2)
SODIUM SERPL-SCNC: 139 MMOL/L (ref 133–145)
WBC # BLD AUTO: 8.5 X10*3/UL (ref 4.4–11.3)

## 2024-07-02 PROCEDURE — 85027 COMPLETE CBC AUTOMATED: CPT | Mod: OUT | Performed by: INTERNAL MEDICINE

## 2024-07-02 PROCEDURE — 82565 ASSAY OF CREATININE: CPT | Mod: OUT | Performed by: INTERNAL MEDICINE

## 2024-07-05 ENCOUNTER — NURSING HOME VISIT (OUTPATIENT)
Dept: POST ACUTE CARE | Facility: EXTERNAL LOCATION | Age: 79
End: 2024-07-05
Payer: COMMERCIAL

## 2024-07-05 VITALS
WEIGHT: 183.2 LBS | BODY MASS INDEX: 31.43 KG/M2 | SYSTOLIC BLOOD PRESSURE: 130 MMHG | RESPIRATION RATE: 18 BRPM | TEMPERATURE: 98 F | DIASTOLIC BLOOD PRESSURE: 67 MMHG | HEART RATE: 69 BPM | OXYGEN SATURATION: 96 %

## 2024-07-05 DIAGNOSIS — E03.9 ACQUIRED HYPOTHYROIDISM: ICD-10-CM

## 2024-07-05 DIAGNOSIS — H10.31 ACUTE BACTERIAL CONJUNCTIVITIS OF RIGHT EYE: Primary | ICD-10-CM

## 2024-07-05 DIAGNOSIS — M19.90 OSTEOARTHRITIS, UNSPECIFIED OSTEOARTHRITIS TYPE, UNSPECIFIED SITE: ICD-10-CM

## 2024-07-05 DIAGNOSIS — F31.32 BIPOLAR AFFECTIVE DISORDER, CURRENTLY DEPRESSED, MODERATE (MULTI): ICD-10-CM

## 2024-07-05 DIAGNOSIS — I50.9 CHRONIC CONGESTIVE HEART FAILURE, UNSPECIFIED HEART FAILURE TYPE (MULTI): ICD-10-CM

## 2024-07-05 DIAGNOSIS — N30.00 ACUTE CYSTITIS WITHOUT HEMATURIA: ICD-10-CM

## 2024-07-05 ASSESSMENT — ENCOUNTER SYMPTOMS
CHILLS: 0
FREQUENCY: 0
WEAKNESS: 0
HEADACHES: 0
DYSURIA: 0
FEVER: 0
NAUSEA: 0
FLANK PAIN: 0
DIZZINESS: 0
COUGH: 0
CONFUSION: 0
CONSTIPATION: 0
DIARRHEA: 0
HEMATURIA: 0
TREMORS: 0
APPETITE CHANGE: 0
SHORTNESS OF BREATH: 0
VOMITING: 0
ABDOMINAL PAIN: 0
NERVOUS/ANXIOUS: 0
WHEEZING: 0

## 2024-07-05 NOTE — PROGRESS NOTES
Subjective   [id number] : Mariella Cuello is a 79 y.o. female LTC resident at [facility].   HPI  Pt  with h/o CHF, hypothyroid and bipolar disorder was asked to see pt by nursing.   Pt noted to redness and drainage from the right eye. Pt reports some discomfort from the right eye.  She is other wise doing well  Pt seen while resting in bed.  She is alert, pleasant and interactive.    She offers no new complaints.  No shortness of breath or cough.  She has been eating and drinking well.  Her weight is stable.  No n/v/d/c.  She has no lower leg edema.   Code status is DNR- CCA.  Review of Systems   Constitutional:  Negative for appetite change, chills and fever.   Respiratory:  Negative for cough, shortness of breath and wheezing.    Cardiovascular:  Negative for chest pain and leg swelling.   Gastrointestinal:  Negative for abdominal pain, constipation, diarrhea, nausea and vomiting.   Genitourinary:  Negative for dysuria, flank pain, frequency and hematuria.   Neurological:  Negative for dizziness, tremors, weakness and headaches.   Psychiatric/Behavioral:  Negative for confusion. The patient is not nervous/anxious.    All other systems reviewed and are negative.      Objective   /67   Pulse 69   Temp 36.7 °C (98 °F)   Resp 18   Wt 83.1 kg (183 lb 3.2 oz)   SpO2 96%   BMI 31.43 kg/m²    Physical Exam  Constitutional:       General: She is not in acute distress.  Eyes:      General:         Right eye: Discharge present.      Conjunctiva/sclera:      Right eye: Right conjunctiva is injected.      Pupils: Pupils are equal, round, and reactive to light.   Cardiovascular:      Rate and Rhythm: Normal rate and regular rhythm.      Heart sounds: No murmur heard.  Pulmonary:      Effort: Pulmonary effort is normal.      Breath sounds: No wheezing, rhonchi or rales.   Abdominal:      General: Abdomen is flat. Bowel sounds are normal. There is no distension.      Palpations: Abdomen is soft. There is no mass.  "     Tenderness: There is no abdominal tenderness.   Musculoskeletal:         General: No swelling. Normal range of motion.   Skin:     General: Skin is warm and dry.      Findings: No rash.   Neurological:      General: No focal deficit present.      Mental Status: She is alert and oriented to person, place, and time. Mental status is at baseline.       No lab exists for component: \"CBC BMP\"  Assessment/Plan   Chronic congestive heart failure (CMS/HCC) I50.9        Continue lasix.  Stable.   Monitor weights and labs.            Acquired hypothyroidism E03.9       Synthroid 100mcg daily.     last TSH elevated and dosage adjusted.  TSH in 3mo.           Bipolar affective disorder, currently depressed, moderate (CMS/HCC) F31.32       Continue current meds.  Pt follows with psych.  Continues on lorazepam prn for anxiety symptoms.   Well controlled with current meds.            Osteoarthritis:   pain management with tylenol and she does take tramadol prn.       Conjunctivitis:  right eye - treat with gentamicin ophthalmic sol 2 gtts to right eye TID x 7 days.     Acute UTI:  completed course of antibiotics          Time spent: 15 min in review of chart, labs and orders, consultation with pt and documentation.   "

## 2024-07-05 NOTE — LETTER
Patient: Mariella Cuello  : 1945    Encounter Date: 2024      Subjective  [id number] : Mariella Cuello is a 79 y.o. female LTC resident at [facility].   HPI  Pt  with h/o CHF, hypothyroid and bipolar disorder was asked to see pt by nursing.   Pt noted to redness and drainage from the right eye. Pt reports some discomfort from the right eye.  She is other wise doing well  Pt seen while resting in bed.  She is alert, pleasant and interactive.    She offers no new complaints.  No shortness of breath or cough.  She has been eating and drinking well.  Her weight is stable.  No n/v/d/c.  She has no lower leg edema.   Code status is DNR- CCA.  Review of Systems   Constitutional:  Negative for appetite change, chills and fever.   Respiratory:  Negative for cough, shortness of breath and wheezing.    Cardiovascular:  Negative for chest pain and leg swelling.   Gastrointestinal:  Negative for abdominal pain, constipation, diarrhea, nausea and vomiting.   Genitourinary:  Negative for dysuria, flank pain, frequency and hematuria.   Neurological:  Negative for dizziness, tremors, weakness and headaches.   Psychiatric/Behavioral:  Negative for confusion. The patient is not nervous/anxious.    All other systems reviewed and are negative.      Objective  /67   Pulse 69   Temp 36.7 °C (98 °F)   Resp 18   Wt 83.1 kg (183 lb 3.2 oz)   SpO2 96%   BMI 31.43 kg/m²    Physical Exam  Constitutional:       General: She is not in acute distress.  Eyes:      General:         Right eye: Discharge present.      Conjunctiva/sclera:      Right eye: Right conjunctiva is injected.      Pupils: Pupils are equal, round, and reactive to light.   Cardiovascular:      Rate and Rhythm: Normal rate and regular rhythm.      Heart sounds: No murmur heard.  Pulmonary:      Effort: Pulmonary effort is normal.      Breath sounds: No wheezing, rhonchi or rales.   Abdominal:      General: Abdomen is flat. Bowel sounds are normal. There  "is no distension.      Palpations: Abdomen is soft. There is no mass.      Tenderness: There is no abdominal tenderness.   Musculoskeletal:         General: No swelling. Normal range of motion.   Skin:     General: Skin is warm and dry.      Findings: No rash.   Neurological:      General: No focal deficit present.      Mental Status: She is alert and oriented to person, place, and time. Mental status is at baseline.       No lab exists for component: \"CBC BMP\"  Assessment/Plan  Chronic congestive heart failure (CMS/HCC) I50.9        Continue lasix.  Stable.   Monitor weights and labs.            Acquired hypothyroidism E03.9       Synthroid 100mcg daily.     last TSH elevated and dosage adjusted.  TSH in 3mo.           Bipolar affective disorder, currently depressed, moderate (CMS/HCC) F31.32       Continue current meds.  Pt follows with psych.  Continues on lorazepam prn for anxiety symptoms.   Well controlled with current meds.            Osteoarthritis:   pain management with tylenol and she does take tramadol prn.       Conjunctivitis:  right eye - treat with gentamicin ophthalmic sol 2 gtts to right eye TID x 7 days.     Acute UTI:  completed course of antibiotics          Time spent: 15 min in review of chart, labs and orders, consultation with pt and documentation.       Electronically Signed By: Ana Paula Paul PA-C   7/5/24  7:38 PM  "

## 2024-08-07 DIAGNOSIS — F31.32 BIPOLAR AFFECTIVE DISORDER, CURRENTLY DEPRESSED, MODERATE (MULTI): Primary | ICD-10-CM

## 2024-08-07 RX ORDER — ALPRAZOLAM 0.5 MG/1
0.5 TABLET ORAL EVERY 8 HOURS PRN
Qty: 90 TABLET | Refills: 5 | Status: SHIPPED | OUTPATIENT
Start: 2024-08-07 | End: 2025-02-03

## 2024-08-08 ENCOUNTER — NURSING HOME VISIT (OUTPATIENT)
Dept: POST ACUTE CARE | Facility: EXTERNAL LOCATION | Age: 79
End: 2024-08-08
Payer: COMMERCIAL

## 2024-08-08 VITALS
TEMPERATURE: 97.1 F | WEIGHT: 176.6 LBS | SYSTOLIC BLOOD PRESSURE: 109 MMHG | OXYGEN SATURATION: 97 % | DIASTOLIC BLOOD PRESSURE: 53 MMHG | HEART RATE: 70 BPM | BODY MASS INDEX: 30.3 KG/M2 | RESPIRATION RATE: 18 BRPM

## 2024-08-08 DIAGNOSIS — F31.32 BIPOLAR AFFECTIVE DISORDER, CURRENTLY DEPRESSED, MODERATE (MULTI): ICD-10-CM

## 2024-08-08 DIAGNOSIS — E03.9 ACQUIRED HYPOTHYROIDISM: ICD-10-CM

## 2024-08-08 DIAGNOSIS — I50.9 CHRONIC CONGESTIVE HEART FAILURE, UNSPECIFIED HEART FAILURE TYPE (MULTI): Primary | ICD-10-CM

## 2024-08-08 DIAGNOSIS — M19.90 OSTEOARTHRITIS, UNSPECIFIED OSTEOARTHRITIS TYPE, UNSPECIFIED SITE: ICD-10-CM

## 2024-08-08 PROCEDURE — 99308 SBSQ NF CARE LOW MDM 20: CPT | Performed by: PHYSICIAN ASSISTANT

## 2024-08-08 ASSESSMENT — ENCOUNTER SYMPTOMS
HEMATURIA: 0
FLANK PAIN: 0
SHORTNESS OF BREATH: 0
VOMITING: 0
DYSURIA: 0
WEAKNESS: 0
WHEEZING: 0
DIZZINESS: 0
FREQUENCY: 0
COUGH: 0
TREMORS: 0
FEVER: 0
CONFUSION: 0
HEADACHES: 0
NAUSEA: 0
NERVOUS/ANXIOUS: 0
CONSTIPATION: 0
DIARRHEA: 0
CHILLS: 0
APPETITE CHANGE: 0
ABDOMINAL PAIN: 0

## 2024-08-08 NOTE — PROGRESS NOTES
Subjective   [id number] : Mariella Cuello is a 79 y.o. female LTC resident at [facility].   HPI  Pt  with h/o CHF, hypothyroid and bipolar disorder was asked to see pt by nursing.  Overnight nursing reported that pt complained of feeling short of breath.   Pt was given a breathing treatment and she reported feeling better.   Pt offers no complaints this morning.  She denies any shortness of breath or cough.   No wheezing.  No cold symptoms.   Pulse ox is 97% on room air.  She has no lower leg edema.  She was tested for covid which was negative.   Pt seen while resting in bed.  She has been eating and drinking well.  Her weight is stable.  No n/v/d/c.   Code status is DNR- CCA.  Review of Systems   Constitutional:  Negative for appetite change, chills and fever.   Respiratory:  Negative for cough, shortness of breath and wheezing.    Cardiovascular:  Negative for chest pain and leg swelling.   Gastrointestinal:  Negative for abdominal pain, constipation, diarrhea, nausea and vomiting.   Genitourinary:  Negative for dysuria, flank pain, frequency and hematuria.   Neurological:  Negative for dizziness, tremors, weakness and headaches.   Psychiatric/Behavioral:  Negative for confusion. The patient is not nervous/anxious.    All other systems reviewed and are negative.      Objective   /53   Pulse 70   Temp 36.2 °C (97.1 °F)   Resp 18   Wt 80.1 kg (176 lb 9.6 oz)   SpO2 97%   BMI 30.30 kg/m²    Physical Exam  Constitutional:       General: She is not in acute distress.  Eyes:      Pupils: Pupils are equal, round, and reactive to light.   Cardiovascular:      Rate and Rhythm: Normal rate and regular rhythm.      Heart sounds: No murmur heard.  Pulmonary:      Effort: Pulmonary effort is normal.      Breath sounds: No wheezing, rhonchi or rales.   Abdominal:      General: Abdomen is flat. Bowel sounds are normal. There is no distension.      Palpations: Abdomen is soft. There is no mass.      Tenderness:  "There is no abdominal tenderness.   Musculoskeletal:         General: No swelling. Normal range of motion.   Skin:     General: Skin is warm and dry.      Findings: No rash.   Neurological:      General: No focal deficit present.      Mental Status: She is alert and oriented to person, place, and time. Mental status is at baseline.       No lab exists for component: \"CBC BMP\"  Assessment/Plan   Chronic congestive heart failure (CMS/HCC) I50.9        Continue lasix.  Stable.   Monitor weights and labs.            Acquired hypothyroidism E03.9       Synthroid 100mcg daily.     last TSH elevated and dosage adjusted.  TSH in 3mo.           Bipolar affective disorder, currently depressed, moderate (CMS/HCC) F31.32       Continue current meds.  Pt follows with psych.  Now on xanax for anxiety symptoms.             Osteoarthritis:   pain management with tylenol and she does take tramadol prn.           Time spent: 15 min in review of chart, labs and orders, consultation with pt and documentation.   "

## 2024-08-08 NOTE — LETTER
Patient: Mariella Cuello  : 1945    Encounter Date: 2024      Subjective  [id number] : Mariella Cuello is a 79 y.o. female LTC resident at [facility].   HPI  Pt  with h/o CHF, hypothyroid and bipolar disorder was asked to see pt by nursing.  Overnight nursing reported that pt complained of feeling short of breath.   Pt was given a breathing treatment and she reported feeling better.   Pt offers no complaints this morning.  She denies any shortness of breath or cough.   No wheezing.  No cold symptoms.   Pulse ox is 97% on room air.  She has no lower leg edema.  She was tested for covid which was negative.   Pt seen while resting in bed.  She has been eating and drinking well.  Her weight is stable.  No n/v/d/c.   Code status is DNR- CCA.  Review of Systems   Constitutional:  Negative for appetite change, chills and fever.   Respiratory:  Negative for cough, shortness of breath and wheezing.    Cardiovascular:  Negative for chest pain and leg swelling.   Gastrointestinal:  Negative for abdominal pain, constipation, diarrhea, nausea and vomiting.   Genitourinary:  Negative for dysuria, flank pain, frequency and hematuria.   Neurological:  Negative for dizziness, tremors, weakness and headaches.   Psychiatric/Behavioral:  Negative for confusion. The patient is not nervous/anxious.    All other systems reviewed and are negative.      Objective  /53   Pulse 70   Temp 36.2 °C (97.1 °F)   Resp 18   Wt 80.1 kg (176 lb 9.6 oz)   SpO2 97%   BMI 30.30 kg/m²    Physical Exam  Constitutional:       General: She is not in acute distress.  Eyes:      Pupils: Pupils are equal, round, and reactive to light.   Cardiovascular:      Rate and Rhythm: Normal rate and regular rhythm.      Heart sounds: No murmur heard.  Pulmonary:      Effort: Pulmonary effort is normal.      Breath sounds: No wheezing, rhonchi or rales.   Abdominal:      General: Abdomen is flat. Bowel sounds are normal. There is no distension.     "  Palpations: Abdomen is soft. There is no mass.      Tenderness: There is no abdominal tenderness.   Musculoskeletal:         General: No swelling. Normal range of motion.   Skin:     General: Skin is warm and dry.      Findings: No rash.   Neurological:      General: No focal deficit present.      Mental Status: She is alert and oriented to person, place, and time. Mental status is at baseline.       No lab exists for component: \"CBC BMP\"  Assessment/Plan  Chronic congestive heart failure (CMS/HCC) I50.9        Continue lasix.  Stable.   Monitor weights and labs.            Acquired hypothyroidism E03.9       Synthroid 100mcg daily.     last TSH elevated and dosage adjusted.  TSH in 3mo.           Bipolar affective disorder, currently depressed, moderate (CMS/HCC) F31.32       Continue current meds.  Pt follows with psych.  Now on xanax for anxiety symptoms.             Osteoarthritis:   pain management with tylenol and she does take tramadol prn.           Time spent: 15 min in review of chart, labs and orders, consultation with pt and documentation.       Electronically Signed By: Ana Paula Paul PA-C   8/8/24  1:01 PM  "

## 2024-09-12 ENCOUNTER — NURSING HOME VISIT (OUTPATIENT)
Dept: POST ACUTE CARE | Facility: EXTERNAL LOCATION | Age: 79
End: 2024-09-12
Payer: COMMERCIAL

## 2024-09-12 VITALS
HEART RATE: 69 BPM | SYSTOLIC BLOOD PRESSURE: 105 MMHG | BODY MASS INDEX: 30.92 KG/M2 | RESPIRATION RATE: 18 BRPM | TEMPERATURE: 97.7 F | WEIGHT: 180.2 LBS | DIASTOLIC BLOOD PRESSURE: 56 MMHG | OXYGEN SATURATION: 98 %

## 2024-09-12 DIAGNOSIS — I50.9 CHRONIC CONGESTIVE HEART FAILURE, UNSPECIFIED HEART FAILURE TYPE (MULTI): ICD-10-CM

## 2024-09-12 DIAGNOSIS — E03.9 ACQUIRED HYPOTHYROIDISM: ICD-10-CM

## 2024-09-12 DIAGNOSIS — F31.32 BIPOLAR AFFECTIVE DISORDER, CURRENTLY DEPRESSED, MODERATE (MULTI): ICD-10-CM

## 2024-09-12 DIAGNOSIS — M19.90 OSTEOARTHRITIS, UNSPECIFIED OSTEOARTHRITIS TYPE, UNSPECIFIED SITE: Primary | ICD-10-CM

## 2024-09-12 PROCEDURE — 99308 SBSQ NF CARE LOW MDM 20: CPT | Performed by: PHYSICIAN ASSISTANT

## 2024-09-12 ASSESSMENT — ENCOUNTER SYMPTOMS
WHEEZING: 0
CHILLS: 0
HEADACHES: 0
HEMATURIA: 0
FLANK PAIN: 0
DIZZINESS: 0
FEVER: 0
FREQUENCY: 0
SHORTNESS OF BREATH: 0
CONSTIPATION: 0
DIARRHEA: 0
APPETITE CHANGE: 0
WEAKNESS: 0
VOMITING: 0
NERVOUS/ANXIOUS: 0
DYSURIA: 0
TREMORS: 0
CONFUSION: 0
COUGH: 0
NAUSEA: 0
ABDOMINAL PAIN: 0

## 2024-09-12 NOTE — PROGRESS NOTES
Subjective   [id number] : Mariella Cuello is a 79 y.o. female LTC resident at [facility].   HPI  Pt  with h/o CHF, hypothyroid and bipolar disorder was asked to see pt by nursing.   Pt complaining of contraction of the 4th finger on her left hand.  She state that she is no longer able to straighten the finger.  She experiences pain when she tries.   There is no redness or swelling noted.  There is some enlargement of the joint consistent with arthritic changes.  No pain at rest.   Pt is otherwise doing well.  She has been getting out of bed and out of her room more the last couple of days.  She continues to use xanax prn for her anxiety symptoms which seems to be effective.   She denies any shortness of breath or cough.   She has no lower leg edema.   She has been eating and drinking well.  Her weight is stable.  No n/v/d/c.   Code status is DNR- CCA.  Review of Systems   Constitutional:  Negative for appetite change, chills and fever.   Respiratory:  Negative for cough, shortness of breath and wheezing.    Cardiovascular:  Negative for chest pain and leg swelling.   Gastrointestinal:  Negative for abdominal pain, constipation, diarrhea, nausea and vomiting.   Genitourinary:  Negative for dysuria, flank pain, frequency and hematuria.   Neurological:  Negative for dizziness, tremors, weakness and headaches.   Psychiatric/Behavioral:  Negative for confusion. The patient is not nervous/anxious.    All other systems reviewed and are negative.      Objective   /56   Pulse 69   Temp 36.5 °C (97.7 °F)   Resp 18   Wt 81.7 kg (180 lb 3.2 oz)   SpO2 98%   BMI 30.92 kg/m²    Physical Exam  Constitutional:       General: She is not in acute distress.  Eyes:      Pupils: Pupils are equal, round, and reactive to light.   Cardiovascular:      Rate and Rhythm: Normal rate and regular rhythm.      Heart sounds: No murmur heard.  Pulmonary:      Effort: Pulmonary effort is normal.      Breath sounds: No wheezing,  "rhonchi or rales.   Abdominal:      General: Abdomen is flat. Bowel sounds are normal. There is no distension.      Palpations: Abdomen is soft. There is no mass.      Tenderness: There is no abdominal tenderness.   Musculoskeletal:         General: No swelling. Normal range of motion.      Comments: Fouth digit on the left hand is bent toward the palm.   I do not note any nodules or thickened cords of tissue under the skin.  Enlargement of the proximal interphalangeal joint.  No redness, swelling or signs of inflammation.   Skin:     General: Skin is warm and dry.      Findings: No rash.   Neurological:      General: No focal deficit present.      Mental Status: She is alert and oriented to person, place, and time. Mental status is at baseline.       No lab exists for component: \"CBC BMP\"  Assessment/Plan   Chronic congestive heart failure (CMS/HCC) I50.9        Continue lasix.  Stable.   Monitor weights and labs.            Acquired hypothyroidism E03.9       Synthroid 100mcg daily.     last TSH elevated and dosage adjusted.  TSH in 3mo.           Bipolar affective disorder, currently depressed, moderate (CMS/HCC) F31.32       Continue current meds.  Pt follows with psych.  Now on xanax for anxiety symptoms.             Osteoarthritis:   pain management with tylenol and she does take tramadol prn.   Noted to have contracture of the 4th digit on the left hand.   OT eval and treat.        Time spent: 15 min in review of chart, labs and orders, consultation with pt and documentation.   "

## 2024-09-12 NOTE — LETTER
Patient: Mariella Cuello  : 1945    Encounter Date: 2024      Subjective   [id number] : Mariella Cuello is a 79 y.o. female LTC resident at [facility].   HPI  Pt  with h/o CHF, hypothyroid and bipolar disorder was asked to see pt by nursing.   Pt complaining of contraction of the 4th finger on her left hand.  She state that she is no longer able to straighten the finger.  She experiences pain when she tries.   There is no redness or swelling noted.  There is some enlargement of the joint consistent with arthritic changes.  No pain at rest.   Pt is otherwise doing well.  She has been getting out of bed and out of her room more the last couple of days.  She continues to use xanax prn for her anxiety symptoms which seems to be effective.   She denies any shortness of breath or cough.   She has no lower leg edema.   She has been eating and drinking well.  Her weight is stable.  No n/v/d/c.   Code status is DNR- CCA.  Review of Systems   Constitutional:  Negative for appetite change, chills and fever.   Respiratory:  Negative for cough, shortness of breath and wheezing.    Cardiovascular:  Negative for chest pain and leg swelling.   Gastrointestinal:  Negative for abdominal pain, constipation, diarrhea, nausea and vomiting.   Genitourinary:  Negative for dysuria, flank pain, frequency and hematuria.   Neurological:  Negative for dizziness, tremors, weakness and headaches.   Psychiatric/Behavioral:  Negative for confusion. The patient is not nervous/anxious.    All other systems reviewed and are negative.      Objective   /56   Pulse 69   Temp 36.5 °C (97.7 °F)   Resp 18   Wt 81.7 kg (180 lb 3.2 oz)   SpO2 98%   BMI 30.92 kg/m²    Physical Exam  Constitutional:       General: She is not in acute distress.  Eyes:      Pupils: Pupils are equal, round, and reactive to light.   Cardiovascular:      Rate and Rhythm: Normal rate and regular rhythm.      Heart sounds: No murmur heard.  Pulmonary:       "Effort: Pulmonary effort is normal.      Breath sounds: No wheezing, rhonchi or rales.   Abdominal:      General: Abdomen is flat. Bowel sounds are normal. There is no distension.      Palpations: Abdomen is soft. There is no mass.      Tenderness: There is no abdominal tenderness.   Musculoskeletal:         General: No swelling. Normal range of motion.      Comments: Fouth digit on the left hand is bent toward the palm.   I do not note any nodules or thickened cords of tissue under the skin.  Enlargement of the proximal interphalangeal joint.  No redness, swelling or signs of inflammation.   Skin:     General: Skin is warm and dry.      Findings: No rash.   Neurological:      General: No focal deficit present.      Mental Status: She is alert and oriented to person, place, and time. Mental status is at baseline.       No lab exists for component: \"CBC BMP\"  Assessment/Plan   Chronic congestive heart failure (CMS/HCC) I50.9        Continue lasix.  Stable.   Monitor weights and labs.            Acquired hypothyroidism E03.9       Synthroid 100mcg daily.     last TSH elevated and dosage adjusted.  TSH in 3mo.           Bipolar affective disorder, currently depressed, moderate (CMS/HCC) F31.32       Continue current meds.  Pt follows with psych.  Now on xanax for anxiety symptoms.             Osteoarthritis:   pain management with tylenol and she does take tramadol prn.   Noted to have contracture of the 4th digit on the left hand.   OT eval and treat.        Time spent: 15 min in review of chart, labs and orders, consultation with pt and documentation.       Electronically Signed By: Ana Paula Paul PA-C   9/12/24  3:18 PM  "

## 2024-09-19 ENCOUNTER — LAB REQUISITION (OUTPATIENT)
Dept: LAB | Facility: HOSPITAL | Age: 79
End: 2024-09-19
Payer: COMMERCIAL

## 2024-09-19 DIAGNOSIS — E03.9 HYPOTHYROIDISM: ICD-10-CM

## 2024-09-19 LAB
T4 FREE SERPL-MCNC: 0.94 NG/DL (ref 0.61–1.12)
TSH SERPL-ACNC: 0.21 MIU/L (ref 0.44–3.98)

## 2024-09-19 PROCEDURE — 84439 ASSAY OF FREE THYROXINE: CPT | Mod: OUT | Performed by: INTERNAL MEDICINE

## 2024-09-19 PROCEDURE — 84443 ASSAY THYROID STIM HORMONE: CPT | Mod: OUT | Performed by: INTERNAL MEDICINE

## 2024-09-27 ENCOUNTER — NURSING HOME VISIT (OUTPATIENT)
Dept: POST ACUTE CARE | Facility: EXTERNAL LOCATION | Age: 79
End: 2024-09-27
Payer: COMMERCIAL

## 2024-09-27 VITALS
TEMPERATURE: 97.5 F | WEIGHT: 180.2 LBS | HEART RATE: 65 BPM | RESPIRATION RATE: 18 BRPM | BODY MASS INDEX: 30.92 KG/M2 | DIASTOLIC BLOOD PRESSURE: 60 MMHG | OXYGEN SATURATION: 97 % | SYSTOLIC BLOOD PRESSURE: 117 MMHG

## 2024-09-27 DIAGNOSIS — F31.32 BIPOLAR AFFECTIVE DISORDER, CURRENTLY DEPRESSED, MODERATE (MULTI): ICD-10-CM

## 2024-09-27 DIAGNOSIS — M19.90 OSTEOARTHRITIS, UNSPECIFIED OSTEOARTHRITIS TYPE, UNSPECIFIED SITE: Primary | ICD-10-CM

## 2024-09-27 DIAGNOSIS — I50.9 CHRONIC CONGESTIVE HEART FAILURE, UNSPECIFIED HEART FAILURE TYPE: ICD-10-CM

## 2024-09-27 DIAGNOSIS — E03.9 ACQUIRED HYPOTHYROIDISM: ICD-10-CM

## 2024-09-27 PROCEDURE — 99309 SBSQ NF CARE MODERATE MDM 30: CPT | Performed by: PHYSICIAN ASSISTANT

## 2024-09-27 ASSESSMENT — ENCOUNTER SYMPTOMS
FLANK PAIN: 0
WEAKNESS: 0
FEVER: 0
NERVOUS/ANXIOUS: 0
VOMITING: 0
SHORTNESS OF BREATH: 0
FREQUENCY: 0
NAUSEA: 0
APPETITE CHANGE: 0
WHEEZING: 0
DYSURIA: 0
CHILLS: 0
HEMATURIA: 0
HEADACHES: 0
DIZZINESS: 0
ABDOMINAL PAIN: 0
DIARRHEA: 0
COUGH: 0
TREMORS: 0
CONFUSION: 0
CONSTIPATION: 0

## 2024-09-27 NOTE — LETTER
Patient: Mariella Cuello  : 1945    Encounter Date: 2024      Subjective  [id number] : Mariella Cuello is a 79 y.o. female LTC resident at [facility].   HPI  Pt  with h/o CHF, hypothyroid and bipolar disorder was asked to see pt by nursing.   Pt has a sore area to the right ear.  Pt has a deformity of the right ear.  Area on the outer ear appears is mildly erythematous but pt reports it is painful.  Pt spends a lot of time lying in bed on her right side.  Area appears to be pressure related.  Discussed with pt and encouraged her to lay on her left side.  She recently been getting out of bed more and sitting in wheelchair.   Pt encouraged to continue to get  out of bed and participate in activities more regularly as this would be better for her overall well being.   Discussed ear with skin nurse and we will apply at protective dressing to the area to prevent further breakdown.   Pt offers no other questions or concerns.  Therapy has met with pt regarding the contraction of the 4th finger on her left hand.  Pt has declined to work with therapy for this issue.   She continues to use xanax prn for her anxiety symptoms which seems to be effective.   She denies any shortness of breath or cough.   She has no lower leg edema.   She has been eating and drinking well.  Her weight is stable.  No n/v/d/c.   Code status is DNR- CCA.  Review of Systems   Constitutional:  Negative for appetite change, chills and fever.   HENT:  Positive for ear pain (external ear).    Respiratory:  Negative for cough, shortness of breath and wheezing.    Cardiovascular:  Negative for chest pain and leg swelling.   Gastrointestinal:  Negative for abdominal pain, constipation, diarrhea, nausea and vomiting.   Genitourinary:  Negative for dysuria, flank pain, frequency and hematuria.   Neurological:  Negative for dizziness, tremors, weakness and headaches.   Psychiatric/Behavioral:  Negative for confusion. The patient is not  "nervous/anxious.    All other systems reviewed and are negative.      Objective  /60   Pulse 65   Temp 36.4 °C (97.5 °F)   Resp 18   Wt 81.7 kg (180 lb 3.2 oz)   SpO2 97%   BMI 30.92 kg/m²    Physical Exam  Constitutional:       General: She is not in acute distress.  HENT:      Ears:      Comments: Deformity of the right ear with an area of erythema to the anthelix/helix.  There is NO open wound.  No swelling or drainage.   Eyes:      Pupils: Pupils are equal, round, and reactive to light.   Cardiovascular:      Rate and Rhythm: Normal rate and regular rhythm.      Heart sounds: No murmur heard.  Pulmonary:      Effort: Pulmonary effort is normal.      Breath sounds: No wheezing, rhonchi or rales.   Abdominal:      General: Abdomen is flat. Bowel sounds are normal. There is no distension.      Palpations: Abdomen is soft. There is no mass.      Tenderness: There is no abdominal tenderness.   Musculoskeletal:         General: No swelling. Normal range of motion.      Comments: Fouth digit on the left hand is bent toward the palm.   I do not note any nodules or thickened cords of tissue under the skin.  Enlargement of the proximal interphalangeal joint.  No redness, swelling or signs of inflammation.   Skin:     General: Skin is warm and dry.      Findings: No rash.   Neurological:      General: No focal deficit present.      Mental Status: She is alert and oriented to person, place, and time. Mental status is at baseline.       No lab exists for component: \"CBC BMP\"  Assessment/Plan  Chronic congestive heart failure (CMS/HCC) I50.9        Continue lasix.  Stable.   Monitor weights and labs.            Acquired hypothyroidism E03.9       Synthroid 100mcg daily.     last TSH elevated and dosage adjusted.  TSH in 3mo.           Bipolar affective disorder, currently depressed, moderate (CMS/HCC) F31.32       Continue current meds.  Pt follows with psych.  Now on xanax for anxiety symptoms.             " Osteoarthritis:   pain management with tylenol and she does take tramadol prn.   Noted to have contracture of the 4th digit on the left hand.   OT eval and treat - pt has declined to work with therapy.    Pressure area to right ear:  as above will pad and protect area.  Encourage pt to spend more time out of bed or lay on her left side.         Time spent: 15 min in review of chart, labs and orders, consultation with pt and documentation.       Electronically Signed By: Ana Paula Paul PA-C   9/27/24 10:02 AM

## 2024-09-27 NOTE — PROGRESS NOTES
Subjective   [id number] : Mariella Cuello is a 79 y.o. female LTC resident at [facility].   HPI  Pt  with h/o CHF, hypothyroid and bipolar disorder was asked to see pt by nursing.   Pt has a sore area to the right ear.  Pt has a deformity of the right ear.  Area on the outer ear appears is mildly erythematous but pt reports it is painful.  Pt spends a lot of time lying in bed on her right side.  Area appears to be pressure related.  Discussed with pt and encouraged her to lay on her left side.  She recently been getting out of bed more and sitting in wheelchair.   Pt encouraged to continue to get  out of bed and participate in activities more regularly as this would be better for her overall well being.   Discussed ear with skin nurse and we will apply at protective dressing to the area to prevent further breakdown.   Pt offers no other questions or concerns.  Therapy has met with pt regarding the contraction of the 4th finger on her left hand.  Pt has declined to work with therapy for this issue.   She continues to use xanax prn for her anxiety symptoms which seems to be effective.   She denies any shortness of breath or cough.   She has no lower leg edema.   She has been eating and drinking well.  Her weight is stable.  No n/v/d/c.   Code status is DNR- CCA.  Review of Systems   Constitutional:  Negative for appetite change, chills and fever.   HENT:  Positive for ear pain (external ear).    Respiratory:  Negative for cough, shortness of breath and wheezing.    Cardiovascular:  Negative for chest pain and leg swelling.   Gastrointestinal:  Negative for abdominal pain, constipation, diarrhea, nausea and vomiting.   Genitourinary:  Negative for dysuria, flank pain, frequency and hematuria.   Neurological:  Negative for dizziness, tremors, weakness and headaches.   Psychiatric/Behavioral:  Negative for confusion. The patient is not nervous/anxious.    All other systems reviewed and are negative.      Objective  "  /60   Pulse 65   Temp 36.4 °C (97.5 °F)   Resp 18   Wt 81.7 kg (180 lb 3.2 oz)   SpO2 97%   BMI 30.92 kg/m²    Physical Exam  Constitutional:       General: She is not in acute distress.  HENT:      Ears:      Comments: Deformity of the right ear with an area of erythema to the anthelix/helix.  There is NO open wound.  No swelling or drainage.   Eyes:      Pupils: Pupils are equal, round, and reactive to light.   Cardiovascular:      Rate and Rhythm: Normal rate and regular rhythm.      Heart sounds: No murmur heard.  Pulmonary:      Effort: Pulmonary effort is normal.      Breath sounds: No wheezing, rhonchi or rales.   Abdominal:      General: Abdomen is flat. Bowel sounds are normal. There is no distension.      Palpations: Abdomen is soft. There is no mass.      Tenderness: There is no abdominal tenderness.   Musculoskeletal:         General: No swelling. Normal range of motion.      Comments: Fouth digit on the left hand is bent toward the palm.   I do not note any nodules or thickened cords of tissue under the skin.  Enlargement of the proximal interphalangeal joint.  No redness, swelling or signs of inflammation.   Skin:     General: Skin is warm and dry.      Findings: No rash.   Neurological:      General: No focal deficit present.      Mental Status: She is alert and oriented to person, place, and time. Mental status is at baseline.       No lab exists for component: \"CBC BMP\"  Assessment/Plan   Chronic congestive heart failure (CMS/HCC) I50.9        Continue lasix.  Stable.   Monitor weights and labs.            Acquired hypothyroidism E03.9       Synthroid 100mcg daily.     last TSH elevated and dosage adjusted.  TSH in 3mo.           Bipolar affective disorder, currently depressed, moderate (CMS/HCC) F31.32       Continue current meds.  Pt follows with psych.  Now on xanax for anxiety symptoms.             Osteoarthritis:   pain management with tylenol and she does take tramadol prn.   " Noted to have contracture of the 4th digit on the left hand.   OT eval and treat - pt has declined to work with therapy.    Pressure area to right ear:  as above will pad and protect area.  Encourage pt to spend more time out of bed or lay on her left side.         Time spent: 15 min in review of chart, labs and orders, consultation with pt and documentation.

## 2024-10-10 ENCOUNTER — NURSING HOME VISIT (OUTPATIENT)
Dept: POST ACUTE CARE | Facility: EXTERNAL LOCATION | Age: 79
End: 2024-10-10
Payer: COMMERCIAL

## 2024-10-10 DIAGNOSIS — M19.90 OSTEOARTHRITIS, UNSPECIFIED OSTEOARTHRITIS TYPE, UNSPECIFIED SITE: Primary | ICD-10-CM

## 2024-10-10 DIAGNOSIS — I50.9 CHRONIC CONGESTIVE HEART FAILURE, UNSPECIFIED HEART FAILURE TYPE: ICD-10-CM

## 2024-10-10 DIAGNOSIS — E03.9 ACQUIRED HYPOTHYROIDISM: ICD-10-CM

## 2024-10-10 PROBLEM — G30.9 ALZHEIMER'S DEMENTIA (MULTI): Status: RESOLVED | Noted: 2023-12-19 | Resolved: 2024-10-10

## 2024-10-10 PROBLEM — F02.80 ALZHEIMER'S DEMENTIA (MULTI): Status: RESOLVED | Noted: 2023-12-19 | Resolved: 2024-10-10

## 2024-10-10 PROBLEM — F25.9 SCHIZOAFFECTIVE DISORDER (MULTI): Status: RESOLVED | Noted: 2023-12-19 | Resolved: 2024-10-10

## 2024-10-10 PROCEDURE — 99308 SBSQ NF CARE LOW MDM 20: CPT | Performed by: INTERNAL MEDICINE

## 2024-10-10 NOTE — PROGRESS NOTES
PROGRESS NOTE      Mariella Cuello is a 79 y.o. female seen in follow up at [facility].    ASSESSMENT / PLAN:  Diagnoses and all orders for this visit:  Osteoarthritis, unspecified osteoarthritis type, unspecified site  Chronic congestive heart failure, unspecified heart failure type  Acquired hypothyroidism    Hypothyroidism - TSH has been fluctuating w/ adjustments in dose but overall would say clinically euthyroid - recheck as ordered    CHF - lasix, stable, euvolemic clinically    Psych - Same meds, psych does see her, overall stable    Patient Active Problem List   Diagnosis    Chronic congestive heart failure    Acquired hypothyroidism    Altered mental status, unspecified altered mental status type    Bipolar disorder    Schizoaffective disorder (Multi)    Alzheimer's dementia (Multi)    Acute cystitis without hematuria    Osteoarthritis     Continue the currently prescribed medications as outlined in [facility].      Subjective   Overall seems to be doing ok; no complaints per staff.  Chart and orders reviewed.    Review of Systems   Objective   Vital signs reviewed in [facility].  Physical Exam  Vitals reviewed.   Constitutional:       Appearance: Normal appearance.   Cardiovascular:      Rate and Rhythm: Normal rate and regular rhythm.      Heart sounds: No murmur heard.  Pulmonary:      Breath sounds: Normal breath sounds. No wheezing, rhonchi or rales.   Musculoskeletal:      Right lower leg: No edema.      Left lower leg: No edema.         Medical History as seen below has been reviewed and updated in the chart.  Past Medical History:   Diagnosis Date    Alzheimer disease (Multi)     Anemia     Atherosclerotic heart disease of native coronary artery without angina pectoris 05/12/2021    Atherosclerosis of coronary artery without angina pectoris, unspecified vessel or lesion type, unspecified whether native or transplanted heart    Bipolar disorder (Multi)     Cardiomyopathy (Multi)      Chronic systolic (congestive) heart failure (Multi) 05/12/2021    Chronic systolic heart failure, ACC/AHA stage C    Depression     Dysphagia     Gastritis     History of COVID-19     Hyperlipidemia     Hypertension     Hypothyroidism     Major depressive disorder, recurrent, unspecified (CMS-HCC) 05/12/2021    Major depression, recurrent    Obstipation     Respiratory failure (Multi)     Schizoaffective disorder (Multi)     Spinal stenosis     Stroke (Multi)     Systolic heart failure (Multi)      Past Surgical History:   Procedure Laterality Date    CATARACT EXTRACTION  02/29/2016    Cataract Surgery    CORONARY ARTERY BYPASS GRAFT      MR HEAD ANGIO WO IV CONTRAST  01/22/2014    MR HEAD ANGIO WO IV CONTRAST [facility]    TONSILLECTOMY  02/29/2016    Tonsillectomy    TOTAL ABDOMINAL HYSTERECTOMY  02/29/2016    Total Abdominal Hysterectomy     No family history on file.  No Known Allergies  Current Outpatient Medications on File Prior to Visit   Medication Sig Dispense Refill    acetaminophen (Tylenol) 325 mg tablet Take 2 tablets (650 mg) by mouth every 6 hours if needed for mild pain (1 - 3) or fever (temp greater than 38.0 C).      acetaminophen (Tylenol) 650 mg suppository Insert 1 suppository (650 mg) into the rectum every 6 hours if needed for mild pain (1 - 3) or fever (temp greater than 38.0 C).      albuterol sulfate (Proair Digihaler) 90 mcg/actuation aero powdr breath act w/sensor inhaler Inhale 2 puffs every 6 hours if needed for wheezing.      ALPRAZolam (Xanax) 0.5 mg tablet Take 1 tablet (0.5 mg) by mouth every 8 hours if needed for anxiety. 90 tablet 5    alum-mag hydroxide-simeth (Maalox MAX) 400-400-40 mg/5 mL suspension Take 30 mL by mouth every 4 hours if needed for indigestion or heartburn.      ARIPiprazole (Abilify) 2 mg tablet Take 1 tablet (2 mg) by mouth once daily.      ARIPiprazole (Abilify) 5 mg tablet Take 1 tablet (5 mg) by mouth once daily.      aspirin 81 mg EC tablet Take  1 tablet (81 mg) by mouth once daily.      atorvastatin (Lipitor) 20 mg tablet Take 1 tablet (20 mg) by mouth once daily.      bisacodyl (Dulcolax) 10 mg suppository Insert 1 suppository (10 mg) into the rectum once daily as needed for constipation.      bisoprolol (Zebeta) 5 mg tablet Take 0.5 tablets (2.5 mg) by mouth 2 times a day.      busPIRone (Buspar) 5 mg tablet Take 1 tablet (5 mg) by mouth 3 times a day.      cholecalciferol (Vitamin D-3) 5,000 Units tablet Take 1 tablet (5,000 Units) by mouth once daily.      diclofenac epolamine 1.3 % patch Place 1 patch on the skin every 12 hours.      diclofenac sodium (Voltaren) 1 % gel gel Apply 1 Application topically every 8 hours if needed (arthritis pain).      docusate sodium (Colace) 100 mg capsule Take 1 capsule (100 mg) by mouth 2 times a day.      furosemide (Lasix) 20 mg tablet Take 1 tablet (20 mg) by mouth once daily. Every Friday      guaiFENesin (Robitussin) 100 mg/5 mL syrup Take 10 mL (200 mg) by mouth every 4 hours if needed for cough.      lamoTRIgine (LaMICtal) 100 mg tablet Take 1 tablet (100 mg) by mouth 2 times a day.      levothyroxine (Synthroid, Levoxyl) 100 mcg tablet Take 112 mcg by mouth once daily.      loperamide (Imodium A-D) 2 mg tablet Take 1 tablet (2 mg) by mouth every 3 hours if needed for diarrhea.      magnesium hydroxide (Milk of Magnesia) 2,400 mg/10 mL suspension suspension Take 10 mL by mouth once daily as needed for constipation.      melatonin 3 mg capsule Take 6 mg by mouth once daily.      ondansetron (Zofran) 4 mg tablet Take 1 tablet (4 mg) by mouth every 8 hours if needed for nausea or vomiting.      potassium chloride CR 20 mEq ER tablet Take 1 tablet (20 mEq) by mouth once daily.      sennosides (Senokot) 8.6 mg tablet Take 2 tablets (17.2 mg) by mouth once daily.      sodium phosphates (Fleets) 19-7 gram/118 mL enema enema Insert 118 mL (1 enema) into the rectum once daily as needed for constipation.      topiramate  (Topamax) 50 mg tablet Take 1 tablet (50 mg) by mouth 2 times a day.      traZODone (Desyrel) 50 mg tablet Take 0.5 tablets (25 mg) by mouth once daily.      venlafaxine XR (Effexor-XR) 75 mg 24 hr capsule Take 3 capsules (225 mg) by mouth once daily.       No current facility-administered medications on file prior to visit.     Immunization History   Administered Date(s) Administered    Pfizer Gray Cap SARS-CoV-2 05/10/2022    Pfizer Purple Cap SARS-CoV-2 02/02/2021, 03/16/2021, 10/29/2021     Alirio Augustine MD

## 2024-10-10 NOTE — LETTER
Patient: Mariella Cuello  : 1945    Encounter Date: 10/10/2024    PROGRESS NOTE      Mariella Cuello is a 79 y.o. female seen in follow up at [facility].    ASSESSMENT / PLAN:  Diagnoses and all orders for this visit:  Osteoarthritis, unspecified osteoarthritis type, unspecified site  Chronic congestive heart failure, unspecified heart failure type  Acquired hypothyroidism    Hypothyroidism -clinically/chemically euthryoid    CHF - lasix, stable, euvolemic clinically    Psych - Same meds, psych does see her    Patient Active Problem List   Diagnosis   • Chronic congestive heart failure   • Acquired hypothyroidism   • Altered mental status, unspecified altered mental status type   • Bipolar disorder   • Schizoaffective disorder (Multi)   • Alzheimer's dementia (Multi)   • Acute cystitis without hematuria   • Osteoarthritis     Continue the currently prescribed medications as outlined in [facility].      Subjective  Overall seems to be doing well no complaints per staff.  Chart and orders reviewed.    Review of Systems   Objective  Vital signs reviewed in [facility].  Physical Exam  Vitals reviewed.   Constitutional:       Appearance: Normal appearance.   Cardiovascular:      Rate and Rhythm: Normal rate and regular rhythm.      Heart sounds: No murmur heard.  Pulmonary:      Breath sounds: Normal breath sounds. No wheezing, rhonchi or rales.   Musculoskeletal:      Right lower leg: No edema.      Left lower leg: No edema.         Medical History as seen below has been reviewed and updated in the chart.  Past Medical History:   Diagnosis Date   • Alzheimer disease (Multi)    • Anemia    • Atherosclerotic heart disease of native coronary artery without angina pectoris 2021    Atherosclerosis of coronary artery without angina pectoris, unspecified vessel or lesion type, unspecified whether native or transplanted heart   • Bipolar disorder (Multi)    • Cardiomyopathy (Multi)    • Chronic  systolic (congestive) heart failure (Multi) 05/12/2021    Chronic systolic heart failure, ACC/AHA stage C   • Depression    • Dysphagia    • Gastritis    • History of COVID-19    • Hyperlipidemia    • Hypertension    • Hypothyroidism    • Major depressive disorder, recurrent, unspecified (CMS-HCC) 05/12/2021    Major depression, recurrent   • Obstipation    • Respiratory failure (Multi)    • Schizoaffective disorder (Multi)    • Spinal stenosis    • Stroke (Multi)    • Systolic heart failure (Multi)      Past Surgical History:   Procedure Laterality Date   • CATARACT EXTRACTION  02/29/2016    Cataract Surgery   • CORONARY ARTERY BYPASS GRAFT     • MR HEAD ANGIO WO IV CONTRAST  01/22/2014    MR HEAD ANGIO WO IV CONTRAST [facility]   • TONSILLECTOMY  02/29/2016    Tonsillectomy   • TOTAL ABDOMINAL HYSTERECTOMY  02/29/2016    Total Abdominal Hysterectomy     No family history on file.  No Known Allergies  Current Outpatient Medications on File Prior to Visit   Medication Sig Dispense Refill   • acetaminophen (Tylenol) 325 mg tablet Take 2 tablets (650 mg) by mouth every 6 hours if needed for mild pain (1 - 3) or fever (temp greater than 38.0 C).     • acetaminophen (Tylenol) 650 mg suppository Insert 1 suppository (650 mg) into the rectum every 6 hours if needed for mild pain (1 - 3) or fever (temp greater than 38.0 C).     • albuterol sulfate (Proair Digihaler) 90 mcg/actuation aero powdr breath act w/sensor inhaler Inhale 2 puffs every 6 hours if needed for wheezing.     • ALPRAZolam (Xanax) 0.5 mg tablet Take 1 tablet (0.5 mg) by mouth every 8 hours if needed for anxiety. 90 tablet 5   • alum-mag hydroxide-simeth (Maalox MAX) 400-400-40 mg/5 mL suspension Take 30 mL by mouth every 4 hours if needed for indigestion or heartburn.     • ARIPiprazole (Abilify) 2 mg tablet Take 1 tablet (2 mg) by mouth once daily.     • ARIPiprazole (Abilify) 5 mg tablet Take 1 tablet (5 mg) by mouth once daily.     • aspirin 81  mg EC tablet Take 1 tablet (81 mg) by mouth once daily.     • atorvastatin (Lipitor) 20 mg tablet Take 1 tablet (20 mg) by mouth once daily.     • bisacodyl (Dulcolax) 10 mg suppository Insert 1 suppository (10 mg) into the rectum once daily as needed for constipation.     • bisoprolol (Zebeta) 5 mg tablet Take 0.5 tablets (2.5 mg) by mouth 2 times a day.     • busPIRone (Buspar) 5 mg tablet Take 1 tablet (5 mg) by mouth 3 times a day.     • cholecalciferol (Vitamin D-3) 5,000 Units tablet Take 1 tablet (5,000 Units) by mouth once daily.     • diclofenac epolamine 1.3 % patch Place 1 patch on the skin every 12 hours.     • diclofenac sodium (Voltaren) 1 % gel gel Apply 1 Application topically every 8 hours if needed (arthritis pain).     • docusate sodium (Colace) 100 mg capsule Take 1 capsule (100 mg) by mouth 2 times a day.     • furosemide (Lasix) 20 mg tablet Take 1 tablet (20 mg) by mouth once daily. Every Friday     • guaiFENesin (Robitussin) 100 mg/5 mL syrup Take 10 mL (200 mg) by mouth every 4 hours if needed for cough.     • lamoTRIgine (LaMICtal) 100 mg tablet Take 1 tablet (100 mg) by mouth 2 times a day.     • levothyroxine (Synthroid, Levoxyl) 100 mcg tablet Take 112 mcg by mouth once daily.     • loperamide (Imodium A-D) 2 mg tablet Take 1 tablet (2 mg) by mouth every 3 hours if needed for diarrhea.     • magnesium hydroxide (Milk of Magnesia) 2,400 mg/10 mL suspension suspension Take 10 mL by mouth once daily as needed for constipation.     • melatonin 3 mg capsule Take 6 mg by mouth once daily.     • ondansetron (Zofran) 4 mg tablet Take 1 tablet (4 mg) by mouth every 8 hours if needed for nausea or vomiting.     • potassium chloride CR 20 mEq ER tablet Take 1 tablet (20 mEq) by mouth once daily.     • sennosides (Senokot) 8.6 mg tablet Take 2 tablets (17.2 mg) by mouth once daily.     • sodium phosphates (Fleets) 19-7 gram/118 mL enema enema Insert 118 mL (1 enema) into the rectum once daily as  needed for constipation.     • topiramate (Topamax) 50 mg tablet Take 1 tablet (50 mg) by mouth 2 times a day.     • traZODone (Desyrel) 50 mg tablet Take 0.5 tablets (25 mg) by mouth once daily.     • venlafaxine XR (Effexor-XR) 75 mg 24 hr capsule Take 3 capsules (225 mg) by mouth once daily.       No current facility-administered medications on file prior to visit.     Immunization History   Administered Date(s) Administered   • Pfizer Gray Cap SARS-CoV-2 05/10/2022   • Pfizer Purple Cap SARS-CoV-2 02/02/2021, 03/16/2021, 10/29/2021     Alirio Augustine MD      Electronically Signed By: Alirio Augustine MD   10/10/24  9:32 AM

## 2024-10-17 ENCOUNTER — APPOINTMENT (OUTPATIENT)
Dept: CARDIOLOGY | Facility: HOSPITAL | Age: 79
End: 2024-10-17
Payer: COMMERCIAL

## 2024-10-17 ENCOUNTER — APPOINTMENT (OUTPATIENT)
Dept: RADIOLOGY | Facility: HOSPITAL | Age: 79
End: 2024-10-17
Payer: COMMERCIAL

## 2024-10-17 ENCOUNTER — HOSPITAL ENCOUNTER (INPATIENT)
Facility: HOSPITAL | Age: 79
DRG: 871 | End: 2024-10-17
Attending: STUDENT IN AN ORGANIZED HEALTH CARE EDUCATION/TRAINING PROGRAM | Admitting: STUDENT IN AN ORGANIZED HEALTH CARE EDUCATION/TRAINING PROGRAM
Payer: COMMERCIAL

## 2024-10-17 DIAGNOSIS — R93.5 ABNORMAL ABDOMINAL CT SCAN: ICD-10-CM

## 2024-10-17 DIAGNOSIS — N39.0 ACUTE LOWER URINARY TRACT INFECTION: ICD-10-CM

## 2024-10-17 DIAGNOSIS — R79.89 ELEVATED TROPONIN: ICD-10-CM

## 2024-10-17 DIAGNOSIS — I42.0 DILATED CARDIOMYOPATHY (MULTI): ICD-10-CM

## 2024-10-17 DIAGNOSIS — G93.41 SEPSIS WITH ENCEPHALOPATHY AND SEPTIC SHOCK, DUE TO UNSPECIFIED ORGANISM (MULTI): Primary | ICD-10-CM

## 2024-10-17 DIAGNOSIS — R65.21 SEPSIS WITH ENCEPHALOPATHY AND SEPTIC SHOCK, DUE TO UNSPECIFIED ORGANISM (MULTI): Primary | ICD-10-CM

## 2024-10-17 DIAGNOSIS — N17.9 ACUTE KIDNEY INJURY (CMS-HCC): ICD-10-CM

## 2024-10-17 DIAGNOSIS — A41.9 SEPSIS WITH ENCEPHALOPATHY AND SEPTIC SHOCK, DUE TO UNSPECIFIED ORGANISM (MULTI): Primary | ICD-10-CM

## 2024-10-17 PROBLEM — I10 BENIGN ESSENTIAL HYPERTENSION: Status: ACTIVE | Noted: 2024-10-17

## 2024-10-17 PROBLEM — I25.810 ARTERIOSCLEROSIS OF CORONARY ARTERY BYPASS GRAFT: Status: ACTIVE | Noted: 2024-10-17

## 2024-10-17 PROBLEM — I42.9 CARDIOMYOPATHY: Status: ACTIVE | Noted: 2024-10-17

## 2024-10-17 LAB
ALBUMIN SERPL BCP-MCNC: 4.3 G/DL (ref 3.4–5)
ALP SERPL-CCNC: 116 U/L (ref 33–136)
ALT SERPL W P-5'-P-CCNC: 23 U/L (ref 7–45)
ANION GAP SERPL CALCULATED.3IONS-SCNC: 17 MMOL/L (ref 10–20)
APPEARANCE UR: ABNORMAL
APTT PPP: 27.3 SECONDS (ref 22–32.5)
AST SERPL W P-5'-P-CCNC: 29 U/L (ref 9–39)
BACTERIA #/AREA URNS AUTO: ABNORMAL /HPF
BASOPHILS # BLD AUTO: 0.03 X10*3/UL (ref 0–0.1)
BASOPHILS NFR BLD AUTO: 0.3 %
BILIRUB SERPL-MCNC: 0.6 MG/DL (ref 0–1.2)
BILIRUB UR STRIP.AUTO-MCNC: NEGATIVE MG/DL
BNP SERPL-MCNC: 1995 PG/ML (ref 0–99)
BUN SERPL-MCNC: 16 MG/DL (ref 6–23)
CALCIUM SERPL-MCNC: 10.5 MG/DL (ref 8.6–10.3)
CARDIAC TROPONIN I PNL SERPL HS: 676 NG/L (ref 0–13)
CARDIAC TROPONIN I PNL SERPL HS: 999 NG/L (ref 0–13)
CHLORIDE SERPL-SCNC: 104 MMOL/L (ref 98–107)
CO2 SERPL-SCNC: 21 MMOL/L (ref 21–32)
COLOR UR: YELLOW
CREAT SERPL-MCNC: 1.45 MG/DL (ref 0.5–1.05)
EGFRCR SERPLBLD CKD-EPI 2021: 37 ML/MIN/1.73M*2
EOSINOPHIL # BLD AUTO: 0.02 X10*3/UL (ref 0–0.4)
EOSINOPHIL NFR BLD AUTO: 0.2 %
ERYTHROCYTE [DISTWIDTH] IN BLOOD BY AUTOMATED COUNT: 12.9 % (ref 11.5–14.5)
GLUCOSE BLD MANUAL STRIP-MCNC: 158 MG/DL (ref 74–99)
GLUCOSE SERPL-MCNC: 135 MG/DL (ref 74–99)
GLUCOSE UR STRIP.AUTO-MCNC: NORMAL MG/DL
HCT VFR BLD AUTO: 52.5 % (ref 36–46)
HGB BLD-MCNC: 17.7 G/DL (ref 12–16)
IMM GRANULOCYTES # BLD AUTO: 0.04 X10*3/UL (ref 0–0.5)
IMM GRANULOCYTES NFR BLD AUTO: 0.4 % (ref 0–0.9)
KETONES UR STRIP.AUTO-MCNC: ABNORMAL MG/DL
LACTATE SERPL-SCNC: 1.8 MMOL/L (ref 0.4–2)
LACTATE SERPL-SCNC: 3.1 MMOL/L (ref 0.4–2)
LEUKOCYTE ESTERASE UR QL STRIP.AUTO: ABNORMAL
LYMPHOCYTES # BLD AUTO: 2.78 X10*3/UL (ref 0.8–3)
LYMPHOCYTES NFR BLD AUTO: 27.3 %
MCH RBC QN AUTO: 32.2 PG (ref 26–34)
MCHC RBC AUTO-ENTMCNC: 33.7 G/DL (ref 32–36)
MCV RBC AUTO: 96 FL (ref 80–100)
MONOCYTES # BLD AUTO: 0.33 X10*3/UL (ref 0.05–0.8)
MONOCYTES NFR BLD AUTO: 3.2 %
MUCOUS THREADS #/AREA URNS AUTO: ABNORMAL /LPF
NEUTROPHILS # BLD AUTO: 7 X10*3/UL (ref 1.6–5.5)
NEUTROPHILS NFR BLD AUTO: 68.6 %
NITRITE UR QL STRIP.AUTO: ABNORMAL
NRBC BLD-RTO: 0 /100 WBCS (ref 0–0)
PH UR STRIP.AUTO: 6.5 [PH]
PLATELET # BLD AUTO: 306 X10*3/UL (ref 150–450)
POTASSIUM SERPL-SCNC: 4.2 MMOL/L (ref 3.5–5.3)
PROT SERPL-MCNC: 7.9 G/DL (ref 6.4–8.2)
PROT UR STRIP.AUTO-MCNC: ABNORMAL MG/DL
RBC # BLD AUTO: 5.5 X10*6/UL (ref 4–5.2)
RBC # UR STRIP.AUTO: ABNORMAL /UL
RBC #/AREA URNS AUTO: >20 /HPF
SODIUM SERPL-SCNC: 138 MMOL/L (ref 136–145)
SP GR UR STRIP.AUTO: 1.02
SQUAMOUS #/AREA URNS AUTO: ABNORMAL /HPF
T4 FREE SERPL-MCNC: 1.05 NG/DL (ref 0.61–1.12)
TSH SERPL-ACNC: 4.35 MIU/L (ref 0.44–3.98)
UROBILINOGEN UR STRIP.AUTO-MCNC: NORMAL MG/DL
WBC # BLD AUTO: 10.2 X10*3/UL (ref 4.4–11.3)
WBC #/AREA URNS AUTO: ABNORMAL /HPF

## 2024-10-17 PROCEDURE — 96365 THER/PROPH/DIAG IV INF INIT: CPT | Mod: 59

## 2024-10-17 PROCEDURE — 36415 COLL VENOUS BLD VENIPUNCTURE: CPT

## 2024-10-17 PROCEDURE — 99291 CRITICAL CARE FIRST HOUR: CPT | Mod: 25

## 2024-10-17 PROCEDURE — 51702 INSERT TEMP BLADDER CATH: CPT

## 2024-10-17 PROCEDURE — 81001 URINALYSIS AUTO W/SCOPE: CPT

## 2024-10-17 PROCEDURE — 87086 URINE CULTURE/COLONY COUNT: CPT | Mod: TRILAB,WESLAB

## 2024-10-17 PROCEDURE — 85730 THROMBOPLASTIN TIME PARTIAL: CPT | Performed by: STUDENT IN AN ORGANIZED HEALTH CARE EDUCATION/TRAINING PROGRAM

## 2024-10-17 PROCEDURE — 93005 ELECTROCARDIOGRAM TRACING: CPT

## 2024-10-17 PROCEDURE — 84484 ASSAY OF TROPONIN QUANT: CPT

## 2024-10-17 PROCEDURE — 02HV33Z INSERTION OF INFUSION DEVICE INTO SUPERIOR VENA CAVA, PERCUTANEOUS APPROACH: ICD-10-PCS | Performed by: STUDENT IN AN ORGANIZED HEALTH CARE EDUCATION/TRAINING PROGRAM

## 2024-10-17 PROCEDURE — 36556 INSERT NON-TUNNEL CV CATH: CPT | Performed by: STUDENT IN AN ORGANIZED HEALTH CARE EDUCATION/TRAINING PROGRAM

## 2024-10-17 PROCEDURE — 87075 CULTR BACTERIA EXCEPT BLOOD: CPT | Mod: TRILAB

## 2024-10-17 PROCEDURE — 70450 CT HEAD/BRAIN W/O DYE: CPT | Performed by: RADIOLOGY

## 2024-10-17 PROCEDURE — 2550000001 HC RX 255 CONTRASTS: Performed by: STUDENT IN AN ORGANIZED HEALTH CARE EDUCATION/TRAINING PROGRAM

## 2024-10-17 PROCEDURE — 80053 COMPREHEN METABOLIC PANEL: CPT

## 2024-10-17 PROCEDURE — 2500000004 HC RX 250 GENERAL PHARMACY W/ HCPCS (ALT 636 FOR OP/ED): Mod: JZ

## 2024-10-17 PROCEDURE — 99291 CRITICAL CARE FIRST HOUR: CPT | Performed by: STUDENT IN AN ORGANIZED HEALTH CARE EDUCATION/TRAINING PROGRAM

## 2024-10-17 PROCEDURE — 71045 X-RAY EXAM CHEST 1 VIEW: CPT | Mod: REPEAT PROCEDURE BY ANOTHER PHYSICIAN | Performed by: RADIOLOGY

## 2024-10-17 PROCEDURE — 2500000005 HC RX 250 GENERAL PHARMACY W/O HCPCS

## 2024-10-17 PROCEDURE — 93010 ELECTROCARDIOGRAM REPORT: CPT | Performed by: INTERNAL MEDICINE

## 2024-10-17 PROCEDURE — 84439 ASSAY OF FREE THYROXINE: CPT | Performed by: STUDENT IN AN ORGANIZED HEALTH CARE EDUCATION/TRAINING PROGRAM

## 2024-10-17 PROCEDURE — 3E043XZ INTRODUCTION OF VASOPRESSOR INTO CENTRAL VEIN, PERCUTANEOUS APPROACH: ICD-10-PCS | Performed by: STUDENT IN AN ORGANIZED HEALTH CARE EDUCATION/TRAINING PROGRAM

## 2024-10-17 PROCEDURE — 71045 X-RAY EXAM CHEST 1 VIEW: CPT

## 2024-10-17 PROCEDURE — 84484 ASSAY OF TROPONIN QUANT: CPT | Performed by: STUDENT IN AN ORGANIZED HEALTH CARE EDUCATION/TRAINING PROGRAM

## 2024-10-17 PROCEDURE — 85025 COMPLETE CBC W/AUTO DIFF WBC: CPT

## 2024-10-17 PROCEDURE — 96367 TX/PROPH/DG ADDL SEQ IV INF: CPT | Mod: 59

## 2024-10-17 PROCEDURE — 70450 CT HEAD/BRAIN W/O DYE: CPT

## 2024-10-17 PROCEDURE — 36556 INSERT NON-TUNNEL CV CATH: CPT

## 2024-10-17 PROCEDURE — 2500000004 HC RX 250 GENERAL PHARMACY W/ HCPCS (ALT 636 FOR OP/ED): Performed by: STUDENT IN AN ORGANIZED HEALTH CARE EDUCATION/TRAINING PROGRAM

## 2024-10-17 PROCEDURE — 83880 ASSAY OF NATRIURETIC PEPTIDE: CPT | Performed by: STUDENT IN AN ORGANIZED HEALTH CARE EDUCATION/TRAINING PROGRAM

## 2024-10-17 PROCEDURE — 83605 ASSAY OF LACTIC ACID: CPT

## 2024-10-17 PROCEDURE — 87040 BLOOD CULTURE FOR BACTERIA: CPT | Mod: TRILAB

## 2024-10-17 PROCEDURE — 71045 X-RAY EXAM CHEST 1 VIEW: CPT | Mod: 77

## 2024-10-17 PROCEDURE — 82947 ASSAY GLUCOSE BLOOD QUANT: CPT

## 2024-10-17 PROCEDURE — 74177 CT ABD & PELVIS W/CONTRAST: CPT | Performed by: RADIOLOGY

## 2024-10-17 PROCEDURE — 96375 TX/PRO/DX INJ NEW DRUG ADDON: CPT | Mod: 59

## 2024-10-17 PROCEDURE — 74177 CT ABD & PELVIS W/CONTRAST: CPT

## 2024-10-17 PROCEDURE — 84443 ASSAY THYROID STIM HORMONE: CPT | Performed by: STUDENT IN AN ORGANIZED HEALTH CARE EDUCATION/TRAINING PROGRAM

## 2024-10-17 RX ORDER — HEPARIN SODIUM 10000 [USP'U]/100ML
0-4000 INJECTION, SOLUTION INTRAVENOUS CONTINUOUS
Status: DISCONTINUED | OUTPATIENT
Start: 2024-10-17 | End: 2024-10-19

## 2024-10-17 RX ORDER — HEPARIN SODIUM 5000 [USP'U]/ML
4000 INJECTION, SOLUTION INTRAVENOUS; SUBCUTANEOUS ONCE
Status: COMPLETED | OUTPATIENT
Start: 2024-10-17 | End: 2024-10-17

## 2024-10-17 RX ORDER — TRAMADOL HYDROCHLORIDE 50 MG/1
50 TABLET ORAL EVERY 12 HOURS PRN
COMMUNITY

## 2024-10-17 RX ORDER — DEXTROMETHORPHAN HYDROBROMIDE AND QUINIDINE SULFATE 20; 10 MG/1; MG/1
1 CAPSULE, GELATIN COATED ORAL DAILY
COMMUNITY

## 2024-10-17 RX ORDER — HEPARIN SODIUM 5000 [USP'U]/ML
2000-4000 INJECTION, SOLUTION INTRAVENOUS; SUBCUTANEOUS AS NEEDED
Status: DISCONTINUED | OUTPATIENT
Start: 2024-10-17 | End: 2024-10-19

## 2024-10-17 RX ORDER — NOREPINEPHRINE BITARTRATE/D5W 8 MG/250ML
0-.2 PLASTIC BAG, INJECTION (ML) INTRAVENOUS CONTINUOUS
Status: DISCONTINUED | OUTPATIENT
Start: 2024-10-17 | End: 2024-10-22 | Stop reason: HOSPADM

## 2024-10-17 RX ORDER — VANCOMYCIN 1.75 G/350ML
1250 INJECTION, SOLUTION INTRAVENOUS ONCE
Status: COMPLETED | OUTPATIENT
Start: 2024-10-17 | End: 2024-10-17

## 2024-10-17 RX ORDER — ARIPIPRAZOLE 10 MG/1
TABLET ORAL EVERY 24 HOURS
COMMUNITY

## 2024-10-17 RX ORDER — BUPROPION HYDROCHLORIDE 300 MG/1
TABLET ORAL
COMMUNITY
Start: 2024-08-26

## 2024-10-17 RX ORDER — BUPROPION HYDROCHLORIDE 150 MG/1
TABLET ORAL
COMMUNITY
Start: 2024-08-26

## 2024-10-17 RX ORDER — MAGNESIUM SULFATE HEPTAHYDRATE 40 MG/ML
2 INJECTION, SOLUTION INTRAVENOUS ONCE
Status: COMPLETED | OUTPATIENT
Start: 2024-10-17 | End: 2024-10-17

## 2024-10-17 RX ORDER — CEFTRIAXONE 2 G/50ML
2 INJECTION, SOLUTION INTRAVENOUS ONCE
Status: DISCONTINUED | OUTPATIENT
Start: 2024-10-17 | End: 2024-10-17 | Stop reason: ALTCHOICE

## 2024-10-17 RX ORDER — MEROPENEM AND SODIUM CHLORIDE 1 G/50ML
1 INJECTION, SOLUTION INTRAVENOUS ONCE
Status: COMPLETED | OUTPATIENT
Start: 2024-10-17 | End: 2024-10-17

## 2024-10-17 ASSESSMENT — PAIN DESCRIPTION - PAIN TYPE: TYPE: ACUTE PAIN

## 2024-10-17 ASSESSMENT — PAIN - FUNCTIONAL ASSESSMENT: PAIN_FUNCTIONAL_ASSESSMENT: 0-10

## 2024-10-17 ASSESSMENT — PAIN SCALES - GENERAL: PAINLEVEL_OUTOF10: 0 - NO PAIN

## 2024-10-17 NOTE — ED PROVIDER NOTES
"The patient was seen in conjunction with the advanced practice provider, and I performed a substantive portion of the encounter. The following is my supervisory note.    History:  Patient presents to the ED by EMS for low blood pressure and low blood sugar at rehab facility.  Patient unable to provide any significant history given acuity of presentation.  EMS notes patient appears somnolent, cool and clammy, VS notable for low BP 50-60s/30s/40s and BG in the 50s, IV access with fluid bolus with repeat BP 80s/60s, given glucose with repeat BG 150s.  No reported trauma, no reported infectious symptoms.  Patient simply perseverating of not feeling well but cannot quantify or elaborate on her symptoms.    VS:  /75   Pulse 104   Temp 36.5 °C (97.7 °F) (Temporal)   Resp 17   Ht 1.626 m (5' 4\")   Wt 83.7 kg (184 lb 8.4 oz)   SpO2 96%   BMI 31.67 kg/m²      Physical exam:  CONST: alert, does not appear in acute distress but appears chronically ill  HEAD: normocephalic, atraumatic  ENT: Moderate MM dryness, no rhinorrhea/congestion, posterior oropharynx clear and oral secretions well controlled  EYES: PEPRL, EOMI, no scleral icterus, no nystagmus  CV: RRR, no murmurs, thready to 1+ equal/symmetrical pulses x 2 (BUE)  PULM: CTAB, no respiratory distress, not requiring supplemental O2  ABD: soft, protuberant/non-tender/non-distended, no mass  MSK: No obvious appendicular skeletal trauma/injury  SKIN: Cool with significant pallor in appearance  NEURO: Unable to determine orientation, alert, perseverating, no gross focal neurodeficits of extremities x 4   PSYCH: Anxious affect      I personally reviewed and interpreted the following studies: EKG is NSR 82, RAD, prolonged QTc 574 ms, nonspecific TW findings, no appreciable ischemia, labs are significant for severe troponinemia concerning for NSTEMI, severely elevated BNP concerning for acute HF, erythrocytosis concerning for hemoconcentration secondary to " intravascular depletion/hypovolemia, lactate moderately elevated concerning for underlying infectious/septic processes,, images are notable for CXR no significant pulmonary opacities, cardiac silhouette WNL, CTH no evidence of ICH or intracranial/intracerebral mass effect, and CT A/P no evidence of infectious/inflammatory processes or intra-abdominal abscess .      MDM:  Patient presented to the ED for hypertensive and hyperglycemic skilled rehab.  Concerning PMHx of CAD status post CABG, hypothyroidism, HTN, HLD, GERD, anxiety.    Per Chart Review: ED encounter and hospitalization on 12/18/2023 for reported AMS, discharge summary significant for from have UTI with likely concern of infectious encephalopathy, started on IV antibiotics, neuro consult no concern for CVA or acute processes, improvement in mentation throughout hospital course, discharged back to SNF, LOS 3 days.    Assessment/evaluation multifactorial concerning for infectious/septic processes versus cardiogenic shock complicated by metabolic derangement with hyperglycemia, NSTEMI likely severe T2 MI from profound hypotension complicated by acute on chronic HF, UTI/cystitis, infectious encephalopathy, evidence of CHANNING. No concerning history, clinical evidence/work-up, or exam findings for the concerning differentials of spontaneous ICH/SAH, intracerebral/intracranial mass effect, significant electrolyte/metabolic derangement, significant endocrine derangement, acute intra-abdominal infectious processes, SBO. These conditions have been thoroughly evaluated and determined to be sufficiently unlikely to be the etiology of patient's presenting symptoms.     Critical Care Time: 45 minutes excluding time spend performing procedures, treating other patients, and teaching time.    Critical Concern / Vital Organ System affected: Infectious/septic processes complicated by NSTEMI and encephalopathy, resistant hypotension  /   cardiovascular collapse and systemic  endorgan dysfunction.    Critcal Intervention(s): Telemetry monitoring, frequent reassessments, lab/imaging acquisition and interpretation, judicious IVF bolus, blood cultures and urine cultures obtained, broad-spectrum IV antibiotics initiated, initiated heparin for ACS protocol, RIJ CVC placement for vasopressor support.    I peronally saw the patient and independently provided 45 minutes of critcal care time of the total shared critical care time provided.    Central Line    Performed by: Johnnie Soriano MD  Authorized by: Johnnie Soriano MD    Consent:     Consent obtained:  Emergent situation  Universal protocol:     Procedure explained and questions answered to patient or proxy's satisfaction: no      Relevant documents present and verified: no      Test results available: yes      Imaging studies available: yes      Required blood products, implants, devices, and special equipment available: yes      Site/side marked: no      Immediately prior to procedure, a time out was called: yes      Patient identity confirmed:  Arm band and hospital-assigned identification number  Pre-procedure details:     Indication(s): central venous access and insufficient peripheral access      Hand hygiene: Hand hygiene performed prior to insertion      Sterile barrier technique: All elements of maximal sterile technique followed      Skin preparation:  Chlorhexidine    Skin preparation agent: Skin preparation agent completely dried prior to procedure    Sedation:     Sedation type:  None  Anesthesia:     Anesthesia method:  Local infiltration    Local anesthetic:  Lidocaine 1% w/o epi  Procedure details:     Location:  R internal jugular    Site selection rationale:  Most direct access to central circulation    Patient position:  Supine    Procedural supplies:  Triple lumen    Catheter size:  7 Fr    Landmarks identified: yes      Ultrasound guidance: yes      Ultrasound guidance timing: real time      Sterile ultrasound  techniques: Sterile gel and sterile probe covers were used      Number of attempts:  1    Successful placement: yes    Post-procedure details:     Post-procedure:  Dressing applied and line sutured    Assessment:  Blood return through all ports, no pneumothorax on x-ray, free fluid flow and placement verified by x-ray    Procedure completion:  Tolerated      ED Course/Diagnosis:  ED Course as of 10/19/24 0114   Thu Oct 17, 2024   1938 I personally reviewed and interpreted the EKG @1936: NSR 82, no appreciable ischemia, RAD, prolonged Qtc 574 ms, non-specific TW changes, prior EKG on 12/18/2023 reviewed without any appreciable specific/identifiable changes, and TWI in anterior precordial leads and flattening of lead II. [BC]   2115 I spoke with cardiology, Dr. Hammond.  He states that this does not sound like cardiogenic shock and he believes it be likely related to sepsis.  He is agreeable to have the patient started on heparin and he states that with the patient's ejection fraction of 30 from previous echo, he is not concerned if we decide to start additional fluids.  He states that he will consult on this case. [AR]   2146 This provider states that he will not admit the patient until a CT abdomen pelvis is resulted as well as a CT head.  He states that when these are resulted, patient will be admitted [AR]   0798 I performed a sepsis reperfusion exam on Mariella Cuello on 10/17/2024 11:38 PM    A targeted fluid bolus of 1500 was given, if adult patient did not receive a 30cc/kg fluid bolus, the clinical rationale is CHF    Carlos A Cosme PA-C   [AR]      ED Course User Index  [AR] Carlos A Cosme PA-C  [BC] Johnnie Soriano MD         Diagnoses as of 10/19/24 0114   Sepsis with encephalopathy and septic shock, due to unspecified organism (Multi)   Acute lower urinary tract infection   Acute kidney injury (CMS-HCC)       1. Sepsis with encephalopathy and septic shock, due to unspecified organism (Multi)         2. Acute lower urinary tract infection        3. Dilated cardiomyopathy (Multi)  Transthoracic Echo (TTE) Complete    Transthoracic Echo (TTE) Complete      4. Acute kidney injury (CMS-HCC)                 Johnnie Soriano MD  10/19/24 0114

## 2024-10-18 ENCOUNTER — APPOINTMENT (OUTPATIENT)
Dept: CARDIOLOGY | Facility: HOSPITAL | Age: 79
DRG: 871 | End: 2024-10-18
Payer: COMMERCIAL

## 2024-10-18 PROBLEM — E03.9 HYPOTHYROIDISM: Status: ACTIVE | Noted: 2024-10-18

## 2024-10-18 PROBLEM — R65.21 SEPSIS WITH ENCEPHALOPATHY AND SEPTIC SHOCK, DUE TO UNSPECIFIED ORGANISM (MULTI): Status: ACTIVE | Noted: 2024-10-18

## 2024-10-18 PROBLEM — R79.89 ELEVATED TROPONIN: Status: ACTIVE | Noted: 2024-10-18

## 2024-10-18 PROBLEM — I50.22 CHRONIC SYSTOLIC HEART FAILURE: Status: ACTIVE | Noted: 2024-10-18

## 2024-10-18 PROBLEM — R93.5 ABNORMAL ABDOMINAL CT SCAN: Status: ACTIVE | Noted: 2024-10-18

## 2024-10-18 PROBLEM — G40.909 SEIZURE DISORDER (MULTI): Status: ACTIVE | Noted: 2024-10-18

## 2024-10-18 PROBLEM — G93.41 METABOLIC ENCEPHALOPATHY: Status: ACTIVE | Noted: 2024-10-18

## 2024-10-18 PROBLEM — N17.9 ACUTE KIDNEY INJURY (CMS-HCC): Status: ACTIVE | Noted: 2024-10-18

## 2024-10-18 PROBLEM — A41.9 SEPSIS WITH ENCEPHALOPATHY AND SEPTIC SHOCK, DUE TO UNSPECIFIED ORGANISM (MULTI): Status: ACTIVE | Noted: 2024-10-18

## 2024-10-18 PROBLEM — F25.9 SCHIZOAFFECTIVE DISORDER (MULTI): Status: ACTIVE | Noted: 2024-10-18

## 2024-10-18 PROBLEM — G93.41 SEPSIS WITH ENCEPHALOPATHY AND SEPTIC SHOCK, DUE TO UNSPECIFIED ORGANISM (MULTI): Status: ACTIVE | Noted: 2024-10-18

## 2024-10-18 LAB
ALBUMIN SERPL BCP-MCNC: 3.5 G/DL (ref 3.4–5)
ALP SERPL-CCNC: 94 U/L (ref 33–136)
ALT SERPL W P-5'-P-CCNC: 18 U/L (ref 7–45)
ANION GAP SERPL CALCULATED.3IONS-SCNC: 13 MMOL/L (ref 10–20)
APTT PPP: 100.4 SECONDS (ref 22–32.5)
APTT PPP: 60.2 SECONDS (ref 22–32.5)
APTT PPP: >139 SECONDS (ref 22–32.5)
APTT PPP: >139 SECONDS (ref 22–32.5)
AST SERPL W P-5'-P-CCNC: 31 U/L (ref 9–39)
ATRIAL RATE: 82 BPM
BILIRUB SERPL-MCNC: 0.5 MG/DL (ref 0–1.2)
BUN SERPL-MCNC: 21 MG/DL (ref 6–23)
CALCIUM SERPL-MCNC: 9.4 MG/DL (ref 8.6–10.3)
CARDIAC TROPONIN I PNL SERPL HS: 530 NG/L (ref 0–13)
CARDIAC TROPONIN I PNL SERPL HS: 690 NG/L (ref 0–13)
CHLORIDE SERPL-SCNC: 106 MMOL/L (ref 98–107)
CO2 SERPL-SCNC: 19 MMOL/L (ref 21–32)
CREAT SERPL-MCNC: 1.16 MG/DL (ref 0.5–1.05)
EGFRCR SERPLBLD CKD-EPI 2021: 48 ML/MIN/1.73M*2
ERYTHROCYTE [DISTWIDTH] IN BLOOD BY AUTOMATED COUNT: 12.8 % (ref 11.5–14.5)
GLUCOSE BLD MANUAL STRIP-MCNC: 112 MG/DL (ref 74–99)
GLUCOSE BLD MANUAL STRIP-MCNC: 114 MG/DL (ref 74–99)
GLUCOSE BLD MANUAL STRIP-MCNC: 116 MG/DL (ref 74–99)
GLUCOSE BLD MANUAL STRIP-MCNC: 97 MG/DL (ref 74–99)
GLUCOSE SERPL-MCNC: 109 MG/DL (ref 74–99)
HCT VFR BLD AUTO: 46 % (ref 36–46)
HGB BLD-MCNC: 15.5 G/DL (ref 12–16)
MCH RBC QN AUTO: 32.2 PG (ref 26–34)
MCHC RBC AUTO-ENTMCNC: 33.7 G/DL (ref 32–36)
MCV RBC AUTO: 96 FL (ref 80–100)
NRBC BLD-RTO: 0 /100 WBCS (ref 0–0)
P AXIS: 12 DEGREES
P OFFSET: 194 MS
P ONSET: 140 MS
PLATELET # BLD AUTO: 228 X10*3/UL (ref 150–450)
POTASSIUM SERPL-SCNC: 3.7 MMOL/L (ref 3.5–5.3)
PR INTERVAL: 144 MS
PROT SERPL-MCNC: 6.6 G/DL (ref 6.4–8.2)
Q ONSET: 212 MS
QRS COUNT: 13 BEATS
QRS DURATION: 132 MS
QT INTERVAL: 492 MS
QTC CALCULATION(BAZETT): 574 MS
QTC FREDERICIA: 546 MS
R AXIS: 224 DEGREES
RBC # BLD AUTO: 4.81 X10*6/UL (ref 4–5.2)
SODIUM SERPL-SCNC: 134 MMOL/L (ref 136–145)
T AXIS: 156 DEGREES
T OFFSET: 458 MS
VANCOMYCIN SERPL-MCNC: 14.4 UG/ML (ref 5–20)
VENTRICULAR RATE: 82 BPM
WBC # BLD AUTO: 15.5 X10*3/UL (ref 4.4–11.3)

## 2024-10-18 PROCEDURE — 93306 TTE W/DOPPLER COMPLETE: CPT

## 2024-10-18 PROCEDURE — 82947 ASSAY GLUCOSE BLOOD QUANT: CPT

## 2024-10-18 PROCEDURE — 93306 TTE W/DOPPLER COMPLETE: CPT | Performed by: INTERNAL MEDICINE

## 2024-10-18 PROCEDURE — 2500000001 HC RX 250 WO HCPCS SELF ADMINISTERED DRUGS (ALT 637 FOR MEDICARE OP): Performed by: STUDENT IN AN ORGANIZED HEALTH CARE EDUCATION/TRAINING PROGRAM

## 2024-10-18 PROCEDURE — 84484 ASSAY OF TROPONIN QUANT: CPT | Performed by: STUDENT IN AN ORGANIZED HEALTH CARE EDUCATION/TRAINING PROGRAM

## 2024-10-18 PROCEDURE — 99233 SBSQ HOSP IP/OBS HIGH 50: CPT | Performed by: INTERNAL MEDICINE

## 2024-10-18 PROCEDURE — 99223 1ST HOSP IP/OBS HIGH 75: CPT | Performed by: INTERNAL MEDICINE

## 2024-10-18 PROCEDURE — 92610 EVALUATE SWALLOWING FUNCTION: CPT | Mod: GN | Performed by: SPEECH-LANGUAGE PATHOLOGIST

## 2024-10-18 PROCEDURE — 87081 CULTURE SCREEN ONLY: CPT | Mod: TRILAB,WESLAB | Performed by: STUDENT IN AN ORGANIZED HEALTH CARE EDUCATION/TRAINING PROGRAM

## 2024-10-18 PROCEDURE — 2500000004 HC RX 250 GENERAL PHARMACY W/ HCPCS (ALT 636 FOR OP/ED)

## 2024-10-18 PROCEDURE — 2500000004 HC RX 250 GENERAL PHARMACY W/ HCPCS (ALT 636 FOR OP/ED): Performed by: STUDENT IN AN ORGANIZED HEALTH CARE EDUCATION/TRAINING PROGRAM

## 2024-10-18 PROCEDURE — 85730 THROMBOPLASTIN TIME PARTIAL: CPT | Performed by: STUDENT IN AN ORGANIZED HEALTH CARE EDUCATION/TRAINING PROGRAM

## 2024-10-18 PROCEDURE — 85027 COMPLETE CBC AUTOMATED: CPT | Performed by: STUDENT IN AN ORGANIZED HEALTH CARE EDUCATION/TRAINING PROGRAM

## 2024-10-18 PROCEDURE — 80053 COMPREHEN METABOLIC PANEL: CPT | Performed by: STUDENT IN AN ORGANIZED HEALTH CARE EDUCATION/TRAINING PROGRAM

## 2024-10-18 PROCEDURE — 2020000001 HC ICU ROOM DAILY

## 2024-10-18 PROCEDURE — 80202 ASSAY OF VANCOMYCIN: CPT | Performed by: STUDENT IN AN ORGANIZED HEALTH CARE EDUCATION/TRAINING PROGRAM

## 2024-10-18 RX ORDER — LEVOTHYROXINE SODIUM 88 UG/1
88 TABLET ORAL DAILY
Status: DISCONTINUED | OUTPATIENT
Start: 2024-10-18 | End: 2024-10-22 | Stop reason: HOSPADM

## 2024-10-18 RX ORDER — ATORVASTATIN CALCIUM 20 MG/1
20 TABLET, FILM COATED ORAL NIGHTLY
Status: DISCONTINUED | OUTPATIENT
Start: 2024-10-18 | End: 2024-10-22 | Stop reason: HOSPADM

## 2024-10-18 RX ORDER — INSULIN LISPRO 100 [IU]/ML
0-5 INJECTION, SOLUTION INTRAVENOUS; SUBCUTANEOUS EVERY 4 HOURS
Status: DISCONTINUED | OUTPATIENT
Start: 2024-10-18 | End: 2024-10-22 | Stop reason: HOSPADM

## 2024-10-18 RX ORDER — ESOMEPRAZOLE MAGNESIUM 40 MG/1
40 GRANULE, DELAYED RELEASE ORAL
Status: DISCONTINUED | OUTPATIENT
Start: 2024-10-18 | End: 2024-10-22 | Stop reason: HOSPADM

## 2024-10-18 RX ORDER — ASPIRIN 81 MG/1
81 TABLET ORAL DAILY
Status: DISCONTINUED | OUTPATIENT
Start: 2024-10-18 | End: 2024-10-22 | Stop reason: HOSPADM

## 2024-10-18 RX ORDER — DEXTROSE 50 % IN WATER (D50W) INTRAVENOUS SYRINGE
12.5
Status: DISCONTINUED | OUTPATIENT
Start: 2024-10-18 | End: 2024-10-22 | Stop reason: HOSPADM

## 2024-10-18 RX ORDER — ONDANSETRON HYDROCHLORIDE 2 MG/ML
4 INJECTION, SOLUTION INTRAVENOUS EVERY 6 HOURS PRN
Status: DISCONTINUED | OUTPATIENT
Start: 2024-10-18 | End: 2024-10-22 | Stop reason: HOSPADM

## 2024-10-18 RX ORDER — PANTOPRAZOLE SODIUM 40 MG/10ML
40 INJECTION, POWDER, LYOPHILIZED, FOR SOLUTION INTRAVENOUS
Status: DISCONTINUED | OUTPATIENT
Start: 2024-10-18 | End: 2024-10-22 | Stop reason: HOSPADM

## 2024-10-18 RX ORDER — PANTOPRAZOLE SODIUM 40 MG/1
40 TABLET, DELAYED RELEASE ORAL
Status: DISCONTINUED | OUTPATIENT
Start: 2024-10-18 | End: 2024-10-22 | Stop reason: HOSPADM

## 2024-10-18 RX ORDER — LAMOTRIGINE 100 MG/1
100 TABLET ORAL 2 TIMES DAILY
Status: DISCONTINUED | OUTPATIENT
Start: 2024-10-18 | End: 2024-10-22 | Stop reason: HOSPADM

## 2024-10-18 RX ORDER — VANCOMYCIN HYDROCHLORIDE 1 G/20ML
INJECTION, POWDER, LYOPHILIZED, FOR SOLUTION INTRAVENOUS DAILY PRN
Status: DISCONTINUED | OUTPATIENT
Start: 2024-10-18 | End: 2024-10-19

## 2024-10-18 RX ORDER — ONDANSETRON HYDROCHLORIDE 2 MG/ML
INJECTION, SOLUTION INTRAVENOUS
Status: COMPLETED
Start: 2024-10-18 | End: 2024-10-18

## 2024-10-18 RX ORDER — TOPIRAMATE 25 MG/1
50 TABLET ORAL 2 TIMES DAILY
Status: DISCONTINUED | OUTPATIENT
Start: 2024-10-18 | End: 2024-10-22 | Stop reason: HOSPADM

## 2024-10-18 RX ORDER — VANCOMYCIN 1 G/200ML
1 INJECTION, SOLUTION INTRAVENOUS EVERY 24 HOURS
Status: DISCONTINUED | OUTPATIENT
Start: 2024-10-18 | End: 2024-10-19

## 2024-10-18 RX ORDER — DEXTROSE 50 % IN WATER (D50W) INTRAVENOUS SYRINGE
25
Status: DISCONTINUED | OUTPATIENT
Start: 2024-10-18 | End: 2024-10-22 | Stop reason: HOSPADM

## 2024-10-18 SDOH — SOCIAL STABILITY: SOCIAL INSECURITY: ABUSE: ADULT

## 2024-10-18 SDOH — SOCIAL STABILITY: SOCIAL INSECURITY
WITHIN THE LAST YEAR, HAVE YOU BEEN KICKED, HIT, SLAPPED, OR OTHERWISE PHYSICALLY HURT BY YOUR PARTNER OR EX-PARTNER?: NO

## 2024-10-18 SDOH — SOCIAL STABILITY: SOCIAL INSECURITY: HAS ANYONE EVER THREATENED TO HURT YOUR FAMILY OR YOUR PETS?: NO

## 2024-10-18 SDOH — SOCIAL STABILITY: SOCIAL INSECURITY: WITHIN THE LAST YEAR, HAVE YOU BEEN HUMILIATED OR EMOTIONALLY ABUSED IN OTHER WAYS BY YOUR PARTNER OR EX-PARTNER?: NO

## 2024-10-18 SDOH — SOCIAL STABILITY: SOCIAL INSECURITY: WITHIN THE LAST YEAR, HAVE YOU BEEN AFRAID OF YOUR PARTNER OR EX-PARTNER?: NO

## 2024-10-18 SDOH — ECONOMIC STABILITY: FOOD INSECURITY
WITHIN THE PAST 12 MONTHS, THE FOOD YOU BOUGHT JUST DIDN'T LAST AND YOU DIDN'T HAVE MONEY TO GET MORE.: PATIENT UNABLE TO ANSWER

## 2024-10-18 SDOH — ECONOMIC STABILITY: FOOD INSECURITY
WITHIN THE PAST 12 MONTHS, YOU WORRIED THAT YOUR FOOD WOULD RUN OUT BEFORE YOU GOT THE MONEY TO BUY MORE.: PATIENT UNABLE TO ANSWER

## 2024-10-18 SDOH — SOCIAL STABILITY: SOCIAL INSECURITY
WITHIN THE LAST YEAR, HAVE YOU BEEN RAPED OR FORCED TO HAVE ANY KIND OF SEXUAL ACTIVITY BY YOUR PARTNER OR EX-PARTNER?: NO

## 2024-10-18 SDOH — SOCIAL STABILITY: SOCIAL INSECURITY: DO YOU FEEL UNSAFE GOING BACK TO THE PLACE WHERE YOU ARE LIVING?: NO

## 2024-10-18 SDOH — ECONOMIC STABILITY: HOUSING INSECURITY: IN THE PAST 12 MONTHS, HOW MANY TIMES HAVE YOU MOVED WHERE YOU WERE LIVING?: 0

## 2024-10-18 SDOH — SOCIAL STABILITY: SOCIAL INSECURITY: DOES ANYONE TRY TO KEEP YOU FROM HAVING/CONTACTING OTHER FRIENDS OR DOING THINGS OUTSIDE YOUR HOME?: NO

## 2024-10-18 SDOH — SOCIAL STABILITY: SOCIAL INSECURITY: WERE YOU ABLE TO COMPLETE ALL THE BEHAVIORAL HEALTH SCREENINGS?: YES

## 2024-10-18 SDOH — SOCIAL STABILITY: SOCIAL INSECURITY: ARE THERE ANY APPARENT SIGNS OF INJURIES/BEHAVIORS THAT COULD BE RELATED TO ABUSE/NEGLECT?: NO

## 2024-10-18 SDOH — ECONOMIC STABILITY: INCOME INSECURITY
IN THE PAST 12 MONTHS HAS THE ELECTRIC, GAS, OIL, OR WATER COMPANY THREATENED TO SHUT OFF SERVICES IN YOUR HOME?: PATIENT UNABLE TO ANSWER

## 2024-10-18 SDOH — SOCIAL STABILITY: SOCIAL INSECURITY: HAVE YOU HAD THOUGHTS OF HARMING ANYONE ELSE?: NO

## 2024-10-18 SDOH — SOCIAL STABILITY: SOCIAL INSECURITY: DO YOU FEEL ANYONE HAS EXPLOITED OR TAKEN ADVANTAGE OF YOU FINANCIALLY OR OF YOUR PERSONAL PROPERTY?: NO

## 2024-10-18 SDOH — SOCIAL STABILITY: SOCIAL INSECURITY: ARE YOU OR HAVE YOU BEEN THREATENED OR ABUSED PHYSICALLY, EMOTIONALLY, OR SEXUALLY BY ANYONE?: NO

## 2024-10-18 SDOH — SOCIAL STABILITY: SOCIAL INSECURITY: HAVE YOU HAD ANY THOUGHTS OF HARMING ANYONE ELSE?: NO

## 2024-10-18 ASSESSMENT — ACTIVITIES OF DAILY LIVING (ADL)
TOILETING: NEEDS ASSISTANCE
HEARING - LEFT EAR: FUNCTIONAL
HEARING - RIGHT EAR: FUNCTIONAL
BATHING: NEEDS ASSISTANCE
GROOMING: NEEDS ASSISTANCE
LACK_OF_TRANSPORTATION: PATIENT UNABLE TO ANSWER
DRESSING YOURSELF: NEEDS ASSISTANCE
WALKS IN HOME: NEEDS ASSISTANCE
ADEQUATE_TO_COMPLETE_ADL: YES
JUDGMENT_ADEQUATE_SAFELY_COMPLETE_DAILY_ACTIVITIES: NO
PATIENT'S MEMORY ADEQUATE TO SAFELY COMPLETE DAILY ACTIVITIES?: NO
FEEDING YOURSELF: NEEDS ASSISTANCE

## 2024-10-18 ASSESSMENT — PAIN - FUNCTIONAL ASSESSMENT
PAIN_FUNCTIONAL_ASSESSMENT: 0-10

## 2024-10-18 ASSESSMENT — ENCOUNTER SYMPTOMS
ENDOCRINE NEGATIVE: 1
FATIGUE: 1
CARDIOVASCULAR NEGATIVE: 1
EYES NEGATIVE: 1
PSYCHIATRIC NEGATIVE: 1
RESPIRATORY NEGATIVE: 1
NEUROLOGICAL NEGATIVE: 1
MUSCULOSKELETAL NEGATIVE: 1
GASTROINTESTINAL NEGATIVE: 1

## 2024-10-18 ASSESSMENT — COLUMBIA-SUICIDE SEVERITY RATING SCALE - C-SSRS
1. IN THE PAST MONTH, HAVE YOU WISHED YOU WERE DEAD OR WISHED YOU COULD GO TO SLEEP AND NOT WAKE UP?: NO
2. HAVE YOU ACTUALLY HAD ANY THOUGHTS OF KILLING YOURSELF?: NO
6. HAVE YOU EVER DONE ANYTHING, STARTED TO DO ANYTHING, OR PREPARED TO DO ANYTHING TO END YOUR LIFE?: NO

## 2024-10-18 ASSESSMENT — LIFESTYLE VARIABLES
HOW OFTEN DO YOU HAVE A DRINK CONTAINING ALCOHOL: NEVER
HOW OFTEN DO YOU HAVE 6 OR MORE DRINKS ON ONE OCCASION: NEVER
AUDIT-C TOTAL SCORE: 0
AUDIT-C TOTAL SCORE: 0
SKIP TO QUESTIONS 9-10: 1
HOW MANY STANDARD DRINKS CONTAINING ALCOHOL DO YOU HAVE ON A TYPICAL DAY: PATIENT DOES NOT DRINK

## 2024-10-18 ASSESSMENT — PAIN SCALES - GENERAL
PAINLEVEL_OUTOF10: 0 - NO PAIN

## 2024-10-18 ASSESSMENT — COGNITIVE AND FUNCTIONAL STATUS - GENERAL: PATIENT BASELINE BEDBOUND: YES

## 2024-10-18 ASSESSMENT — PATIENT HEALTH QUESTIONNAIRE - PHQ9
1. LITTLE INTEREST OR PLEASURE IN DOING THINGS: NOT AT ALL
SUM OF ALL RESPONSES TO PHQ9 QUESTIONS 1 & 2: 0
2. FEELING DOWN, DEPRESSED OR HOPELESS: NOT AT ALL

## 2024-10-18 NOTE — PROGRESS NOTES
10/18/24 1130   Discharge Planning   Living Arrangements   ([facility])   Support Systems Children;Other (Comment)  (Care Staff at [facility])   Assistance Needed Yes - ADLs & IADLs   Type of Residence Nursing home/residential care  ([facility])   Home or Post Acute Services Post acute facilities (Rehab/SNF/etc)   Type of Post Acute Facility Services Long term care  ([facility] - Return referral processing.  Awaiting updates.)   Expected Discharge Disposition Inter  ([facility])   Does the patient need discharge transport arranged? Yes   RoundTrip coordination needed? Yes   Has discharge transport been arranged? No

## 2024-10-18 NOTE — CARE PLAN
Problem: Heart Failure  Goal: Improved gas exchange this shift  Outcome: Progressing  Goal: Improved urinary output this shift  Outcome: Progressing  Goal: Reduction in peripheral edema within 24 hours  Outcome: Progressing  Goal: Report improvement of dyspnea/breathlessness this shift  Outcome: Progressing  Goal: Weight from fluid excess reduced over 2-3 days, then stabilize  Outcome: Progressing  Goal: Increase self care and/or family involvement in 24 hours  Outcome: Progressing     Problem: Skin  Goal: Decreased wound size/increased tissue granulation at next dressing change  Outcome: Progressing  Goal: Participates in plan/prevention/treatment measures  Outcome: Progressing  Flowsheets (Taken 10/18/2024 0250)  Participates in plan/prevention/treatment measures:   Discuss with provider PT/OT consult   Elevate heels  Goal: Prevent/manage excess moisture  Outcome: Progressing  Flowsheets (Taken 10/18/2024 0250)  Prevent/manage excess moisture: Cleanse incontinence/protect with barrier cream  Goal: Prevent/minimize sheer/friction injuries  Outcome: Progressing  Flowsheets (Taken 10/18/2024 0250)  Prevent/minimize sheer/friction injuries:   Use pull sheet   HOB 30 degrees or less   Turn/reposition every 2 hours/use positioning/transfer devices  Goal: Promote/optimize nutrition  Outcome: Progressing  Goal: Promote skin healing  Outcome: Progressing     Problem: Infection prevention/bleeding  Goal: Infection s/sx managed  Outcome: Progressing  Goal: No further progression of infection  Outcome: Progressing  Goal: No signs of bleeding  Outcome: Progressing  Goal: Normal coagulation studies  Outcome: Progressing     Problem: Perfusion/Cardiac  Goal: Adequate perfusion to organs/extremities  Outcome: Progressing  Goal: Hemodynamically stable  Outcome: Progressing  Goal: No cardiac arrhythmias  Outcome: Progressing     Problem: Respiratory/Oxygenation  Goal: No signs of respiratory compromise  Outcome:  Progressing  Goal: Tolerates activity without increased O2 demands  Outcome: Progressing     Problem: Neuro/Coping  Goal: Minimal anxiety; utilize coping mechanisms  Outcome: Progressing  Goal: No signs of neurological compromise  Outcome: Progressing  Goal: Increase self care/family involvement  Outcome: Progressing     Problem: Fluid/Electrolyte/Nutrition  Goal: Fluid balance within 1 liter of normovolemia  Outcome: Progressing  Goal: No signs of renal failure  Outcome: Progressing  Goal: Normal electrolyte levels  Outcome: Progressing  Goal: Normal glucose levels  Outcome: Progressing  Goal: Tolerates nutritional intake  Outcome: Progressing   The patient's goals for the shift include      The clinical goals for the shift include Patient will have controlled bp and wean levo throughout shift.

## 2024-10-18 NOTE — PROGRESS NOTES
Inpatient Speech-Language Pathology Clinical Swallow Evaluation       Patient Name: Mariella Cuello  MRN: [id number]  : 1945  Today's Date: 10/18/24  Time Calculation  Start Time: 920  Stop Time: 940  Time Calculation (min): 20 min       Reason for Consultation: Pt was referred for a clinical swallow evaluation d/t altered mental status.     Recommendations:   Solid Diet Recommendations : NPO   Liquid Diet Recommendations: Ice chips after oral care (Sips of water)   Medication Administration Recommendations: With Pureed, Whole      Skilled dysphagia treatment is warranted  at next level of care.     Assessment:  Pt participated in a clinical bedside swallowing evaluation. Pt denied hx of dysphagia, PNA, and confirmed recent weight loss. Pt was unable to quantify her recent weight loss in regard to amount and duration. Pt appears to be a poor historian in light of her current lethargy. Pt was assessed using the Frohna 3oz. Water protocol, an Oral Mechanism Exam, and multiple PO trials including medication administered by Ns with applesauce. The results are as follows:    Oral Phase: Pt demonstrated prolonged mastication during trial of solids with a bobby cracker possibly d/t her lethargic state. Pt had no anterior spillage of bolus, oral prep time was more than 10 seconds, and residue was noted but cleared with liquid wash. Pt tolerated puree trials w/o s/s of oral dysphagia.     Pharyngeal Phase: Pt demonstrated severe coughing after PO trial of thin liquid characterized by facial reddening. It took her several seconds to recover from cough episode but cough was stabilized.     Pt demonstrated to be very lethargic during this evaluation which is likely contributing to the prolonged mastication and coughing post swallow of PO trials of thin liquids. At this time a reassessment would be appropriate when the pt's overall mental status/lethargy has improved to determine her swallowing ability.     Recommend Ice  chips, sips, and meds (in puree) pending reassessment of oropharyngeal swallow function in order to support secretion management and overall QOL.     Consistencies Trialed: Consistencies Trialed: Ice Chips, Thin (IDDSI Level 0) - Straw, Pureed/extremely thick (IDDSI Level 4), Regular (IDDSI Level 7)   Medical Staff Made Aware: Yes     Relevant Imaging Results:  Chest X-ray ordered by Dr. Johnnie Soriano MD:   IMPRESSION:  Right IJ central venous catheter tip is at the level of the mid SVC. No acute cardiopulmonary process.    Plan:  Next Treatment Priority: Reassess   Discussed POC: Patient, Caregiver/family, Nursing   Discussed Risks/Benefits: Yes, Caregiver/Family, Nursing   Patient/Caregiver Agreeable: Yes     Skilled dysphagia treatment is warranted due to for further education and training on dysphagia management including instruction on oropharyngeal therapeutic exercises and/or compensatory swallowing strategies    Goals:  -Pt to participate in reassessment of oropharyngeal swallow function via bedside evaluation to assess possible aspiration and to determine least restrictive diet for meeting pt's nutritional and hydration needs.    Goal initiated:  10/18/2024 Target date: 30 days Baseline: N/A   Today's progress: N/A   Status: Goal initiated    Subjective   Pt was laying down upon SLP arrival. She was able to provide her first name for verification of Pt ID. She was pleasant and cooperative. She was noted to be lethargic with frequent moments of grunting.         General Visit Information:  Past medical history:   Per H&P:   Mariella Cuello is a 79 y.o. female presenting with  has a past medical history of Alzheimer disease (Multi), Anemia, Atherosclerotic heart disease of native coronary artery without angina pectoris (05/12/2021), Bipolar disorder (Multi), Cardiomyopathy (Multi), Chronic systolic (congestive) heart failure (Multi) (05/12/2021), Depression, Dysphagia, Gastritis, History of COVID-19,  Hyperlipidemia, Hypertension, Hypothyroidism, Major depressive disorder, recurrent, unspecified (CMS-HCC) (05/12/2021), Obstipation, Respiratory failure (Multi), Schizoaffective disorder (Multi), Spinal stenosis, Stroke (Multi), and Systolic heart failure (EF 30%-2022) presented form nursing facility via EMS for altered mental status. History is very limited as Patient is not a good historian. History was taken from ED provider and chart review. During my second evaluation, patient is alert and mentioned that she feels week. Denied headache, sob or cough. She mentioned she lives in a nursing home. She does not know what happened and why she is here.      Pain:  Pain Assessment  Pain Assessment: 0-10  0-10 (Numeric) Pain Score: 0 - No pain       Education:   Patient education completed regarding results and recommendations of SLP evaluation. Pt verbalizes understanding with all patient questions answered to satisfaction. SLP contacted her son via telephone and dicussed POC and he verbalized understanding and agreement.     Care Team involvement:   Communicated with bedside nurse prior to evaluation with clearance for patient participation obtained and Results of the bedside swallowing evaluation were discussed with nurse and verbalized understanding was noted.     Disclaimer: This was completed in collaboration with supervising therapist, Corinne Casey M.A. CCC-SLP.

## 2024-10-18 NOTE — PROGRESS NOTES
Vancomycin Dosing by Pharmacy- INITIAL    Mariella Cuello is a 79 y.o. year old female who Pharmacy has been consulted for vancomycin dosing for other complicated UTI . Based on the patient's indication and renal status this patient will be dosed based on a goal AUC of 400-600.     Renal function is currently stable.    Visit Vitals  /90   Pulse 87   Temp 36.5 °C (97.7 °F) (Temporal)   Resp 25        Lab Results   Component Value Date    CREATININE 1.45 (H) 10/17/2024    CREATININE 0.99 10/02/2024    CREATININE 1.10 2024    CREATININE 1.10 2024        Patient weight is as follows:   Vitals:    10/17/24 1946   Weight: 81.6 kg (180 lb)       Cultures:  No results found for the encounter in last 14 days.        No intake/output data recorded.  I/O during current shift:  I/O this shift:  In: 880 [I.V.:146.7; IV Piggyback:733.3]  Out: -     Temp (24hrs), Av.1 °C (96.9 °F), Min:35.6 °C (96.1 °F), Max:36.5 °C (97.7 °F)         Assessment/Plan     Patient has already been given a loading dose of 1250 mg.  Will initiate vancomycin maintenance,  1000 mg every 24 hours.    This dosing regimen is predicted by [employer] to result in the following pharmacokinetic parameters:  Loading dose: N/A  Regimen: 1000 mg IV every 24 hours.  Start time: 19:58 on 10/18/2024  Exposure target: AUC24 (range)400-600 mg/L.hr   NIT60-75: 424 mg/L.hr  AUC24,ss: 516 mg/L.hr  Probability of AUC24 > 400: 78 %  Ctrough,ss: 16.9 mg/L  Probability of Ctrough,ss > 20: 33 %      Follow-up level will be ordered on 10/18 at 0500 unless clinically indicated sooner.  Will continue to monitor renal function daily while on vancomycin and order serum creatinine at least every 48 hours if not already ordered.  Follow for continued vancomycin needs, clinical response, and signs/symptoms of toxicity.       Trey Webb, PharmD

## 2024-10-18 NOTE — CARE PLAN
The patient's goals for the shift include safety     The clinical goals for the shift include maintain hemodynamic stability    Over the shift, the patient did not make progress toward the following goals. Barriers to progression include none. Recommendations to address these barriers include n/a.

## 2024-10-18 NOTE — ED PROVIDER NOTES
HPI   Chief Complaint   Patient presents with    Hypotension     Pt [facility] for low blood pressure and low blood sugar. Staff reported pt was cold,clammy and diaphoretic to ems. Ems unable to obtain IV access, states last BP was 82/58 with a blood sugar of 150. On arrival pt is Alert and oriented, cool to the touch, hypotensive with a bp of 50/40(49) and a febrile. Pt denies pain       Patient is an 79-year-old female presenting via [facility] with concerns for low blood sugar and low blood pressure.  Reported that the staff noted patient be cold, clammy and diaphoretic.  Patient was given glucose well at the rehab facility and on arrival from EMS, blood sugar was within normal limits.  Patient is alert but confused, hypotensive upon arrival.  She states that she is not sure why she is here.  ROS is limited due to patient's mental status, likely in part exacerbated by patient's hypotension.              Patient History   Past Medical History:   Diagnosis Date    Alzheimer disease (Multi)     Anemia     Atherosclerotic heart disease of native coronary artery without angina pectoris 05/12/2021    Atherosclerosis of coronary artery without angina pectoris, unspecified vessel or lesion type, unspecified whether native or transplanted heart    Bipolar disorder (Multi)     Cardiomyopathy (Multi)     Chronic systolic (congestive) heart failure (Multi) 05/12/2021    Chronic systolic heart failure, ACC/AHA stage C    Depression     Dysphagia     Gastritis     History of COVID-19     Hyperlipidemia     Hypertension     Hypothyroidism     Major depressive disorder, recurrent, unspecified (CMS-[facility]) 05/12/2021    Major depression, recurrent    Obstipation     Respiratory failure (Multi)     Schizoaffective disorder (Multi)     Spinal stenosis     Stroke (Multi)     Systolic heart failure (Multi)      Past Surgical History:   Procedure Laterality Date    CATARACT EXTRACTION   02/29/2016    Cataract Surgery    CORONARY ARTERY BYPASS GRAFT      MR HEAD ANGIO WO IV CONTRAST  01/22/2014    MR HEAD ANGIO WO IV CONTRAST [facility]    TONSILLECTOMY  02/29/2016    Tonsillectomy    TOTAL ABDOMINAL HYSTERECTOMY  02/29/2016    Total Abdominal Hysterectomy     No family history on file.  Social History     Tobacco Use    Smoking status: Never     Passive exposure: Never    Smokeless tobacco: Not on file   Vaping Use    Vaping status: Unknown   Substance Use Topics    Alcohol use: Not on file    Drug use: Not on file       Physical Exam   ED Triage Vitals   Temp Heart Rate Resp BP   10/17/24 1946 10/17/24 1945 10/17/24 1945 10/17/24 1946   35.6 °C (96.1 °F) 80 19 (!) 50/40      SpO2 Temp Source Heart Rate Source Patient Position   10/17/24 1945 10/18/24 0154 10/17/24 1946 10/17/24 1946   97 % Temporal Monitor Lying      BP Location FiO2 (%)     10/17/24 1946 --     Left arm        Physical Exam  Vitals and nursing note reviewed.   Constitutional:       Appearance: She is well-developed.      Comments: Awake, confused, laying in examination bed   HENT:      Head: Normocephalic and atraumatic.      Nose: Nose normal.      Mouth/Throat:      Mouth: Mucous membranes are moist.      Pharynx: Oropharynx is clear.   Eyes:      Extraocular Movements: Extraocular movements intact.      Conjunctiva/sclera: Conjunctivae normal.      Pupils: Pupils are equal, round, and reactive to light.   Cardiovascular:      Rate and Rhythm: Normal rate and regular rhythm.      Pulses: Normal pulses.      Heart sounds: Normal heart sounds. No murmur heard.  Pulmonary:      Effort: Pulmonary effort is normal. No respiratory distress.      Breath sounds: Normal breath sounds.   Abdominal:      General: Abdomen is flat.      Palpations: Abdomen is soft.      Tenderness: There is no abdominal tenderness.   Musculoskeletal:         General: No swelling. Normal range of motion.      Cervical back: Normal range of motion  and neck supple.   Skin:     General: Skin is warm and dry.      Capillary Refill: Capillary refill takes less than 2 seconds.   Neurological:      General: No focal deficit present.      Mental Status: She is alert. She is disoriented.   Psychiatric:         Mood and Affect: Mood normal.         Behavior: Behavior normal.           ED Course & [facility]   ED Course as of 10/18/24 0812   Thu Oct 17, 2024   1938 I personally reviewed and interpreted the EKG @1936: NSR 82, no appreciable ischemia, RAD, prolonged Qtc 574 ms, non-specific TW changes, prior EKG on 12/18/2023 reviewed without any appreciable specific/identifiable changes, and TWI in anterior precordial leads and flattening of lead II. [BC]   2115 I spoke with cardiology, Dr. Hammond.  He states that this does not sound like cardiogenic shock and he believes it be likely related to sepsis.  He is agreeable to have the patient started on heparin and he states that with the patient's ejection fraction of 30 from previous echo, he is not concerned if we decide to start additional fluids.  He states that he will consult on this case. [AR]   2146 This provider states that he will not admit the patient until a CT abdomen pelvis is resulted as well as a CT head.  He states that when these are resulted, patient will be admitted [AR]   2338 I performed a sepsis reperfusion exam on Mariella Cuello on 10/17/2024 11:38 PM    A targeted fluid bolus of 1500 was given, if adult patient did not receive a 30cc/kg fluid bolus, the clinical rationale is CHF    Carlos A Cosme PA-C   [AR]      ED Course User Index  [AR] Carlos A Cosme PA-C  [BC] Johnnie Soriano MD         Diagnoses as of 10/18/24 0812   Sepsis with encephalopathy and septic shock, due to unspecified organism (Multi)   Acute lower urinary tract infection   Acute kidney injury (CMS-HCC)                 No data recorded     [place] Coma Scale Score: 14 (10/18/24 0154 : Damari Balbuena, ELIZABETH)                            Medical Decision Making  Patient is an 79-year-old female presenting via EMS with low blood sugar and low blood pressure.  Septic protocol ordered.  Prophylactic antibiotics ordered.  Over concern for possible renal disease and fluid overload, only an additional 500 cc of fluid bolus ordered.  Conditions considered include but are not limited to: Sepsis, UTI, pneumonia, shock.    I saw this patient in conjunction with Dr. Soriano.  CBC is without leukocytosis but does show signs of hemoconcentration with hemoglobin of 17.7.  CMP without significant electrolyte abnormality but does show signs of acute kidney injury.  Initial troponin is significantly elevated at 999.  Heparin has been started.  Repeat troponin is downtrending at 676.  TSH is elevated at 4.35.  BNP elevated at 2000.  Lactate initially elevated and downtrending.  UA with micro is positive for infection.  Prophylactic antibiotics were already started.    During ED stay, patient's blood pressure began to turn significantly hypotensive.  Despite fluids and Trendelenburg position, patient's blood pressure was not adequately recovering.  A central line was placed by attending physician and Levophed was started.  Chest x-ray without acute cardiopulmonary process.  Repeat chest x-ray does show proper placement to central line.  I discussed with cardiology as described above.  They state that they will see the patient in consult.  They do not believe this to be cardiogenic shock and believe this is related to sepsis.  I spoke with the admitting provider, Dr. Becker, who requested that we wait for head CT results and order a CT abdomen pelvis prior to admission.    CT abdomen pelvis does not show signs of infection aside from concern for UTI.  CT head without acute intracranial pathology.  Patient will be admitted to the ICU under the care of Dr. Becker.  As I deemed necessary from the patient's history, physical, laboratory and imaging findings as  well as ED course, I considered the above listed diagnoses.    Upon my evaluation, this patient had a high probability of imminent or life threatening deterioration due to sepsis and hypotension, which required my direct attention, intervention, and personal management.    I personally provided 32 minutes to nonconcurrent critical care time exclusive of time spent on separately billable procedures.  Time includes review of laboratory data, radiology results, discussion with consultants, and monitoring for potential decompensation.  Interventions were performed as documented above.    Portions of this note made with Dragon software, please be mindful of potential grammatical errors.        Medications   norepinephrine (Levophed) 8 mg in dextrose 5% 250 mL (0.032 mg/mL) infusion (premix) (0 mcg/kg/min × 81.6 kg intravenous Stopped 10/18/24 0215)   heparin 25,000 Units in dextrose 5% 250 mL (100 Units/mL) infusion (premix) (700 Units/hr intravenous Restarted 10/18/24 0452)   heparin (porcine) injection 2,000-4,000 Units (has no administration in time range)   aspirin EC tablet 81 mg (has no administration in time range)   atorvastatin (Lipitor) tablet 20 mg (20 mg oral Not Given 10/18/24 0205)   lamoTRIgine (LaMICtal) tablet 100 mg (100 mg oral Not Given 10/18/24 0159)   levothyroxine (Synthroid, Levoxyl) tablet 88 mcg (88 mcg oral Not Given 10/18/24 0553)   topiramate (Topamax) tablet 50 mg (50 mg oral Not Given 10/18/24 0158)   perflutren lipid microspheres (Definity) injection 0.5-10 mL of dilution (has no administration in time range)   sulfur hexafluoride microsphr (Lumason) injection 24.28 mg (has no administration in time range)   perflutren protein A microsphere (Optison) injection 0.5 mL (has no administration in time range)   pantoprazole (ProtoNix) EC tablet 40 mg ( oral See Alternative 10/18/24 0609)     Or   esomeprazole (NexIUM) suspension 40 mg ( nasoduodenal tube See Alternative 10/18/24 0609)     Or    pantoprazole (ProtoNix) injection 40 mg (40 mg intravenous Given 10/18/24 0609)   glucagon (Glucagen) injection 1 mg (has no administration in time range)   dextrose 50 % injection 25 g (has no administration in time range)   dextrose 50 % injection 12.5 g (has no administration in time range)   insulin lispro (HumaLOG) injection 0-5 Units ( subcutaneous Not Given 10/18/24 0413)   piperacillin-tazobactam (Zosyn) 3.375 g in dextrose (iso) IV 50 mL (0 g intravenous Stopped 10/18/24 0335)   vancomycin (Vancocin) pharmacy to dose - pharmacy monitoring (has no administration in time range)   vancomycin (Xellia) 1 g in diluent combination  mL (has no administration in time range)   ondansetron (Zofran) injection 4 mg (4 mg intravenous Given 10/18/24 0412)   meropenem (Merrem) 1 g in sodium chloride 0.9% IV 50 mL (0 g intravenous Stopped 10/17/24 2010)   vancomycin (Xellia) 1,250 mg in diluent combination  mL (0 mg intravenous Stopped 10/17/24 2122)   magnesium sulfate 2 g in sterile water for injection 50 mL (0 g intravenous Stopped 10/17/24 2236)   sodium chloride 0.9 % bolus 1,000 mL (0 mL intravenous Stopped 10/17/24 2003)   glucagon (Glucagen) injection 3 mg (3 mg intravenous Given 10/17/24 2006)   heparin (porcine) injection 4,000 Units (4,000 Units intravenous Given 10/17/24 2048)   iohexol (OMNIPaque) 350 mg iodine/mL solution 75 mL (75 mL intravenous Given 10/17/24 2255)         Labs Reviewed   CBC WITH AUTO DIFFERENTIAL - Abnormal       Result Value    WBC 10.2      nRBC 0.0      RBC 5.50 (*)     Hemoglobin 17.7 (*)     Hematocrit 52.5 (*)     MCV 96      MCH 32.2      MCHC 33.7      RDW 12.9      Platelets 306      Neutrophils % 68.6      Immature Granulocytes %, Automated 0.4      Lymphocytes % 27.3      Monocytes % 3.2      Eosinophils % 0.2      Basophils % 0.3      Neutrophils Absolute 7.00 (*)     Immature Granulocytes Absolute, Automated 0.04      Lymphocytes Absolute 2.78      Monocytes  Absolute 0.33      Eosinophils Absolute 0.02      Basophils Absolute 0.03     COMPREHENSIVE METABOLIC PANEL - Abnormal    Glucose 135 (*)     Sodium 138      Potassium 4.2      Chloride 104      Bicarbonate 21      Anion Gap 17      Urea Nitrogen 16      Creatinine 1.45 (*)     eGFR 37 (*)     Calcium 10.5 (*)     Albumin 4.3      Alkaline Phosphatase 116      Total Protein 7.9      AST 29      Bilirubin, Total 0.6      ALT 23     LACTATE - Abnormal    Lactate 3.1 (*)     Narrative:     Venipuncture immediately after or during the administration of Metamizole may lead to falsely low results. Testing should be performed immediately prior to Metamizole dosing.   TROPONIN I, HIGH SENSITIVITY - Abnormal    Troponin I, High Sensitivity 999 (*)     Narrative:     Less than 99th percentile of normal range cutoff-  Female and children under 18 years old <14 ng/L; Male <21 ng/L: Negative  Repeat testing should be performed if clinically indicated.     Female and children under 18 years old 14-50 ng/L; Male 21-50 ng/L:  Consistent with possible cardiac damage and possible increased clinical   risk. Serial measurements may help to assess extent of myocardial damage.     >50 ng/L: Consistent with cardiac damage, increased clinical risk and  myocardial infarction. Serial measurements may help assess extent of   myocardial damage.      NOTE: Children less than 1 year old may have higher baseline troponin   levels and results should be interpreted in conjunction with the overall   clinical context.     NOTE: Troponin I testing is performed using a different   testing methodology at [facility] than at other   [facility]. Direct result comparisons should only   be made within the same method.   URINALYSIS WITH REFLEX CULTURE AND MICROSCOPIC - Abnormal    Color, Urine Yellow      Appearance, Urine Turbid (*)     Specific Gravity, Urine 1.016      pH, Urine 6.5      Protein, Urine 200 (2+) (*)     Glucose, Urine  Normal      Blood, Urine OVER (3+) (*)     Ketones, Urine TRACE (*)     Bilirubin, Urine NEGATIVE      Urobilinogen, Urine Normal      Nitrite, Urine 2+ (*)     Leukocyte Esterase, Urine 75 Collin/µL (*)     Narrative:     OVER is reported when the result is greater than the clinically reportable range.   B-TYPE NATRIURETIC PEPTIDE - Abnormal    BNP 1,995 (*)     Narrative:        <100 pg/mL - Heart failure unlikely  100-299 pg/mL - Intermediate probability of acute heart                  failure exacerbation. Correlate with clinical                  context and patient history.    >=300 pg/mL - Heart Failure likely. Correlate with clinical                  context and patient history.    BNP testing is performed using different testing methodology at [facility] than at other [facility]. Direct result comparisons should only be made within the same method.      TSH WITH REFLEX TO FREE T4 IF ABNORMAL - Abnormal    Thyroid Stimulating Hormone 4.35 (*)     Narrative:     TSH testing is performed using different testing methodology at [facility] than at other [facility]. Direct result comparisons should only be made within the same method.     MICROSCOPIC ONLY, URINE - Abnormal    WBC, Urine 11-20 (*)     RBC, Urine >20 (*)     Squamous Epithelial Cells, Urine 1-9 (SPARSE)      Bacteria, Urine 2+ (*)     Mucus, Urine FEW     SERIAL TROPONIN-INITIAL - Abnormal    Troponin I, High Sensitivity 676 (*)     Narrative:     Less than 99th percentile of normal range cutoff-  Female and children under 18 years old <14 ng/L; Male <21 ng/L: Negative  Repeat testing should be performed if clinically indicated.     Female and children under 18 years old 14-50 ng/L; Male 21-50 ng/L:  Consistent with possible cardiac damage and possible increased clinical   risk. Serial measurements may help to assess extent of myocardial damage.     >50 ng/L: Consistent with cardiac damage, increased clinical risk  and  myocardial infarction. Serial measurements may help assess extent of   myocardial damage.      NOTE: Children less than 1 year old may have higher baseline troponin   levels and results should be interpreted in conjunction with the overall   clinical context.     NOTE: Troponin I testing is performed using a different   testing methodology at [facility] than at other   [facility]. Direct result comparisons should only   be made within the same method.   SERIAL TROPONIN, 1 HOUR - Abnormal    Troponin I, High Sensitivity 530 (*)     Narrative:     Less than 99th percentile of normal range cutoff-  Female and children under 18 years old <14 ng/L; Male <21 ng/L: Negative  Repeat testing should be performed if clinically indicated.     Female and children under 18 years old 14-50 ng/L; Male 21-50 ng/L:  Consistent with possible cardiac damage and possible increased clinical   risk. Serial measurements may help to assess extent of myocardial damage.     >50 ng/L: Consistent with cardiac damage, increased clinical risk and  myocardial infarction. Serial measurements may help assess extent of   myocardial damage.      NOTE: Children less than 1 year old may have higher baseline troponin   levels and results should be interpreted in conjunction with the overall   clinical context.     NOTE: Troponin I testing is performed using a different   testing methodology at [facility] than at other   [facility]. Direct result comparisons should only   be made within the same method.   APTT - Abnormal    aPTT >139.0 (*)    CBC - Abnormal    WBC 15.5 (*)     nRBC 0.0      RBC 4.81      Hemoglobin 15.5      Hematocrit 46.0      MCV 96      MCH 32.2      MCHC 33.7      RDW 12.8      Platelets 228     COMPREHENSIVE METABOLIC PANEL - Abnormal    Glucose 109 (*)     Sodium 134 (*)     Potassium 3.7      Chloride 106      Bicarbonate 19 (*)     Anion Gap 13      Urea Nitrogen 21      Creatinine  1.16 (*)     eGFR 48 (*)     Calcium 9.4      Albumin 3.5      Alkaline Phosphatase 94      Total Protein 6.6      AST 31      Bilirubin, Total 0.5      ALT 18     TROPONIN I, HIGH SENSITIVITY - Abnormal    Troponin I, High Sensitivity 690 (*)     Narrative:     Less than 99th percentile of normal range cutoff-  Female and children under 18 years old <14 ng/L; Male <21 ng/L: Negative  Repeat testing should be performed if clinically indicated.     Female and children under 18 years old 14-50 ng/L; Male 21-50 ng/L:  Consistent with possible cardiac damage and possible increased clinical   risk. Serial measurements may help to assess extent of myocardial damage.     >50 ng/L: Consistent with cardiac damage, increased clinical risk and  myocardial infarction. Serial measurements may help assess extent of   myocardial damage.      NOTE: Children less than 1 year old may have higher baseline troponin   levels and results should be interpreted in conjunction with the overall   clinical context.     NOTE: Troponin I testing is performed using a different   testing methodology at [facility] than at other   [facility]. Direct result comparisons should only   be made within the same method.   APTT - Abnormal    aPTT >139.0 (*)    POCT GLUCOSE - Abnormal    POCT Glucose 158 (*)    POCT GLUCOSE - Abnormal    POCT Glucose 116 (*)    POCT GLUCOSE - Abnormal    POCT Glucose 114 (*)    POCT GLUCOSE - Abnormal    POCT Glucose 112 (*)    BLOOD CULTURE - Normal    Blood Culture Loaded on Instrument - Culture in progress     BLOOD CULTURE - Normal    Blood Culture Loaded on Instrument - Culture in progress     LACTATE - Normal    Lactate 1.8      Narrative:     Venipuncture immediately after or during the administration of Metamizole may lead to falsely low results. Testing should be performed immediately prior to Metamizole dosing.   THYROXINE, FREE - Normal    Thyroxine, Free 1.05      Narrative:     Thyroxine  Free testing is performed using different testing methodology at [facility] than at other [facility]. Direct result comparisons should only be made within the same method.    Biotin can cause falsely elevated free T4 results. Patients taking a Biotin dose of up to 10 mg/day should refrain from taking Biotin for 24 hours before sample collection. Patient taking a Biotin dose of >10 mg/day should consult with their physician or the laboratory before the blood draw.   APTT - Normal    aPTT 27.3     VANCOMYCIN - Normal    Vancomycin 14.4      Narrative:     Vancomycin levels can be monitored according to area under the curve (AUC) or concentration (ug/mL). The preferred monitoring strategy is determined by the patient's renal function and indication for therapy.     For AUC monitoring, a random vancomycin level should be interpreted in the context of AUC rather than the concentration at a single point in time.    For concentration monitoring, a trough concentration drawn immediately prior to the next dose is preferred.       Therapeutic ranges using concentration-guided results:  Peak (all ages):                  30.0-40.0 ug/mL  Trough (all ages):                10.0-20.0 ug/mL              URINE CULTURE   STAPHYLOCOCCUS AUREUS/MRSA COLONIZATION, CULTURE   TROPONIN SERIES- (INITIAL, 1 HR)    Narrative:     The following orders were created for panel order Troponin Series, (0, 1 HR).  Procedure                               Abnormality         Status                     ---------                               -----------         ------                     Troponin I, High Sensiti...[[id number]]  Abnormal            Final result               Troponin, High Sensitivi...[[id number]]  Abnormal            Final result                 Please view results for these tests on the individual orders.   URINALYSIS WITH REFLEX CULTURE AND MICROSCOPIC    Narrative:     The following orders were created for panel  order Urinalysis with Reflex Culture and Microscopic.  Procedure                               Abnormality         Status                     ---------                               -----------         ------                     Urinalysis with Reflex C...[[id number]]  Abnormal            Final result               Extra Urine Gray Tube[[id number]]                                                         Please view results for these tests on the individual orders.   EXTRA URINE GRAY TUBE   POCT GLUCOSE METER   POCT GLUCOSE METER   POCT GLUCOSE METER         CT head wo IV contrast   Final Result   No acute intracranial hemorrhage, mass effect or midline shift.        Nonspecific scattered white matter hypodensities favored to represent   sequela of small vessel ischemia.                  MACRO:   None.        Signed by: Evan Finkelstein 10/17/2024 11:16 PM   Dictation workstation:   [id number]      CT abdomen pelvis w IV contrast   Final Result   A Carr catheter partially decompresses the urinary bladder, which   limits evaluation. There is, however, associated wall thickening with   mild adjacent stranding. Recommend correlation with urinalysis as   findings may be seen with cystitis.        There is gallbladder distention, however, no calcified gallstones or   wall thickening are evident.        Gastric wall thickening. Correlate for symptoms of gastritis.        Partially imaged bibasilar opacities favored to represent   atelectasis. Developing infection is not excluded in the appropriate   clinical setting.        Scattered colonic diverticulosis. No CT evidence of acute   diverticulitis.        Focal wall thickening of the cecum best seen on coronal image 39 of   series 5, with appearance suspicious for a colonic mass. Recommend   nonemergent colonoscopy to further evaluate.        MACRO:   None.        Signed by: Evan Finkelstein 10/17/2024 11:26 PM   Dictation workstation:   [id number]      XR chest 1 view    Final Result   Right IJ central venous catheter tip is at the level of the mid SVC.        No acute cardiopulmonary process.        MACRO:   None        Signed by: Ivana Levy 10/17/2024 9:23 PM   Dictation workstation:   [id number]      XR chest 1 view   Final Result   1.  Bibasilar atelectasis versus infiltrate.                  MACRO:   None        Signed by: Dev Burch 10/17/2024 7:55 PM   Dictation workstation:   [id number]      Transthoracic Echo (TTE) Complete    (Results Pending)         Procedure  Procedures     Carlos A Cosme PA-C  10/18/24 0815

## 2024-10-18 NOTE — H&P
History Of Present Illness  Mariella Cuello is a 79 y.o. female presenting with  has a past medical history of Alzheimer disease (Multi), Anemia, Atherosclerotic heart disease of native coronary artery without angina pectoris (05/12/2021), Bipolar disorder (Multi), Cardiomyopathy (Multi), Chronic systolic (congestive) heart failure (Multi) (05/12/2021), Depression, Dysphagia, Gastritis, History of COVID-19, Hyperlipidemia, Hypertension, Hypothyroidism, Major depressive disorder, recurrent, unspecified (CMS-HCC) (05/12/2021), Obstipation, Respiratory failure (Multi), Schizoaffective disorder (Multi), Spinal stenosis, Stroke (Multi), and Systolic heart failure (EF 30%-2022) presented form nursing facility via EMS for altered mental status. History is very limited as Patient is not a good historian. History was taken from ED provider and chart review. During my second evaluation, patient is alert and mentioned that she feels week. Denied headache, sob or cough. She mentioned she lives in a nursing home. She does not know what happened and why she is here.    ED workup concerning for hypotension, elevated lactate and UTI, likely septic shock. I asked ED to do ct abdomen before admitting her. Was started to Leveophed after trial of 1 L of bolus. She is DNR with no intubation. Due to elevated troponin, ED reached out to cardiology and was recommended to start on a heparin drip. Patient will be admitted under hospital medicine for higher level of care in the intensive care unit for septic shock requiring pressors.     Past Medical History  She has a past medical history of Alzheimer disease (Multi), Anemia, Atherosclerotic heart disease of native coronary artery without angina pectoris (05/12/2021), Bipolar disorder (Multi), Cardiomyopathy (Multi), Chronic systolic (congestive) heart failure (Multi) (05/12/2021), Depression, Dysphagia, Gastritis, History of COVID-19, Hyperlipidemia, Hypertension, Hypothyroidism, Major  depressive disorder, recurrent, unspecified (CMS-HCC) (05/12/2021), Obstipation, Respiratory failure (Multi), Schizoaffective disorder (Multi), Spinal stenosis, Stroke (Multi), and Systolic heart failure (Multi).    Surgical History  She has a past surgical history that includes Tonsillectomy (02/29/2016); Cataract extraction (02/29/2016); Total abdominal hysterectomy (02/29/2016); MR angio head wo IV contrast (01/22/2014); and Coronary artery bypass graft.     Social History  She reports that she has never smoked. She has never been exposed to tobacco smoke. She does not have any smokeless tobacco history on file. No history on file for alcohol use and drug use.    Family History  No family history on file.     Allergies  Patient has no known allergies.    Review of Systems   Unable to perform ROS: Mental status change        Physical Exam  Constitutional:       Appearance: She is ill-appearing.   HENT:      Head: Normocephalic and atraumatic.      Mouth/Throat:      Mouth: Mucous membranes are dry.      Pharynx: Oropharynx is clear.   Eyes:      Extraocular Movements: Extraocular movements intact.      Conjunctiva/sclera: Conjunctivae normal.      Pupils: Pupils are equal, round, and reactive to light.   Neck:      Comments: Right Cental line cath  Cardiovascular:      Rate and Rhythm: Normal rate and regular rhythm.      Pulses: Normal pulses.      Heart sounds: Normal heart sounds.   Pulmonary:      Effort: Pulmonary effort is normal.      Breath sounds: Normal breath sounds.   Abdominal:      General: Abdomen is flat. Bowel sounds are normal.   Musculoskeletal:         General: Normal range of motion.      Cervical back: Normal range of motion. No rigidity or tenderness.      Right lower leg: Edema present.      Left lower leg: Edema present.   Skin:     General: Skin is warm.      Capillary Refill: Capillary refill takes less than 2 seconds.   Neurological:      Mental Status: She is alert.      Cranial  Nerves: No cranial nerve deficit.      Sensory: No sensory deficit.      Motor: No weakness.      Comments: Alert and oriented to self and place and month. She knows she is in a hospital and month is Oct. Does not know why she is here and what happened.    Psychiatric:         Mood and Affect: Mood normal.         Behavior: Behavior normal.         Thought Content: Thought content normal.         Judgment: Judgment normal.          Last Recorded Vitals  /81   Pulse 99   Temp 35.6 °C (96.1 °F)   Resp (!) 24   Wt 81.6 kg (180 lb)   SpO2 95%     Relevant Results             Assessment/Plan   Assessment & Plan  Sepsis with encephalopathy and septic shock, due to unspecified organism (Multi)    Chronic congestive heart failure    Acquired hypothyroidism    Benign essential hypertension    Cardiomyopathy    # Septic Shock requiring Pressors  - Initially hypotensive, 1 L of fluid and then requiring Levophed  - UA positive, culture pending  - Ct abdomen negative  - Cxr with no acute process  - Send and follow blood culture  - Start broad spectrum abx Zosyn and Vancomycin  - ID consult  - ICU level of care  - NPO until bedside swallow  - Lactic acid is normalized  - Input/Output monitoring    # Metabolic encephalopathy  - Head ct negative  - Likely due to above  - Behavioral modifications to prevent delirium    # CHF due to systolic dysfunction  # Elevated troponin  # NSTEMI  - BNP 1995, no chest pain, EKG neagtive  - Likely due to sepsis  - Echo previous 2022 EF 30-34%  - Order echo   - Cardiology consulted by ED  - Continue heparin drip for now  - Hold htn medications for now    # Elevated Renal indices  - Baseline cr is 0.9  - Cr today is 1.45  - UO monitoring  - Likely due to shock  - Will follow bmp    # Hypothyroidism  - Continue home medication thyroxine    # Hx of seizures?  - Continue home medications    # Schizoaffective disorder  # Hx of Parkinson?  - Hold sedative medications in light of acute  encephalopathy  - I did not find any medication for Parkinson  - Will introduce as able    # HLD  - Continue home statin    # Hx of Constipation  - Bowel regimen ordered    # Concern for Colonic mass in the ct abdomen 10/17  - Follow outpatient with colonscopy    DVT: Heparin drip for NSTEMI    Disposition: Pending improvement, will likely discharge is 3 to 4 days         Bryson Becker MD

## 2024-10-18 NOTE — PROGRESS NOTES
"Mariella Cuello is a 79 y.o. female on day 0 of admission presenting with Sepsis with encephalopathy and septic shock, due to UTI.     Subjective   She had been on norepinephrine drip which has been discontinued.  She was awake, oriented to self, did not know where she was, she knew what year it was but not the month.  She had elevated troponin levels and is on a heparin drip.    Objective     Physical Exam  General: awake, alert, oriented, responsive  Neck: There was a right internal jugular triple-lumen catheter in place with evidence of recent bleeding with blood under the dressing.  Cardiovascular: regular, normal S1 and S2  Lungs: good air entry bilaterally, clear to auscultation  Abdomen: soft, nontender, bowel sounds present, normoactive  Extremities: no peripheral cyanosis, no pedal edema  Neuro: awake, oriented to self, stated the year correctly, moving all extremities      Last Recorded Vitals  Blood pressure 135/88, pulse 90, temperature 36.6 °C (97.9 °F), temperature source Temporal, resp. rate 25, height 1.626 m (5' 4\"), weight 83.3 kg (183 lb 10.3 oz), SpO2 95%.  Intake/Output last 3 Shifts:  I/O last 3 completed shifts:  In: 959 (11.5 mL/kg) [I.V.:175.7 (2.1 mL/kg); IV Piggyback:783.3]  Out: 275 (3.3 mL/kg) [Urine:275 (0.1 mL/kg/hr)]  Weight: 83.3 kg     Relevant Results  Lab Results   Component Value Date    WBC 15.5 (H) 10/18/2024    HGB 15.5 10/18/2024    HCT 46.0 10/18/2024    MCV 96 10/18/2024     10/18/2024     Lab Results   Component Value Date    GLUCOSE 109 (H) 10/18/2024    CALCIUM 9.4 10/18/2024     (L) 10/18/2024    K 3.7 10/18/2024    CO2 19 (L) 10/18/2024     10/18/2024    BUN 21 10/18/2024    CREATININE 1.16 (H) 10/18/2024     Scheduled medications  aspirin, 81 mg, oral, Daily  atorvastatin, 20 mg, oral, Nightly  pantoprazole, 40 mg, oral, Daily before breakfast   Or  esomeprazole, 40 mg, nasoduodenal tube, Daily before breakfast   Or  pantoprazole, 40 mg, intravenous, " Daily before breakfast  insulin lispro, 0-5 Units, subcutaneous, q4h  lamoTRIgine, 100 mg, oral, BID  levothyroxine, 88 mcg, oral, Daily  perflutren protein A microsphere, 0.5 mL, intravenous, Once in imaging  piperacillin-tazobactam, 3.375 g, intravenous, q6h  sulfur hexafluoride microsphr, 2 mL, intravenous, Once in imaging  topiramate, 50 mg, oral, BID  vancomycin, 1 g, intravenous, q24h      Continuous medications  heparin, 0-4,000 Units/hr, Last Rate: 700 Units/hr (10/18/24 1052)  norepinephrine, 0-0.2 mcg/kg/min, Last Rate: Stopped (10/18/24 0215)      PRN medications  PRN medications: dextrose, dextrose, glucagon, heparin (porcine), ondansetron, vancomycin      Assessment/Plan   Assessment & Plan  Sepsis with encephalopathy and septic shock, due to unspecified organism (Multi)  Suspected sepsis secondary to UTI  She has been weaned off Levophed  Continue broad-spectrum antibiotic coverage: On IV Zosyn and vancomycin.  Metabolic encephalopathy  Secondary to UTI with sepsis.  IV antibiotics as above  Elevated troponin  She has been treated as acute coronary syndrome.  Continue heparin drip.  No beta-blocker because of recent hypotension  Acute kidney injury (CMS-HCC)  Secondary to sepsis and hypotension, improving  Chronic systolic heart failure  A repeat 2D echo has been ordered  Hypothyroidism  On levothyroxine  Seizure disorder (Multi)  On valproic acid.  Seizure precaution  Schizoaffective disorder (Multi)  Per previous records  Abnormal abdominal CT scan  Concern for colonic mass in the cecum on CT abdomen and pelvis done on 10/17  Gastroenterology consult        She is currently admitted to the ICU at [facility]  Discussed with cardiologist, Dr. Hammond  Updated her son, Casimiro, by phone  Christoph Amaya MD

## 2024-10-18 NOTE — PROCEDURES
Vascular Access Team Procedure Note     Visit Date: 10/18/2024      Patient Name: Mariella Cuello         MRN: [id number]             Procedure: Was called to pt's room to assess her TLC. Per RN it has been oozing bloody drainage overnight and this morning. It was placed last evening. Upon assessment of TLC, pt has a RIJ TLC, drsg soiled with blood under drsg, there is blood draining down from bandage onto pt's chest. Will change drsg. Drsg has been changed using sterile technique, pt has ecchymoses around insertion site, it looks like the bleeding is coming from around insertion site, I did use Surgifoam over insertion site to try and stop bleeding, bleeding seems controlled at this time, drsg change done with sterile technique. I changed all three blue caps, both the white and blue lumens have brisk blood return and flush easily with NS, curos caps applied. The brown lumen does not have blood return but does flush easily with NS, clamped and curos cap applied, RN aware of brown lumen not having blood return. Bleeding controlled at this time, I advised RN to let me know if there are any changes. Will continue to monitor.                           Dona Patrick RN  10/18/2024  1:24 PM

## 2024-10-18 NOTE — CONSULTS
Inpatient consult to Infectious Diseases  Consult performed by: Luci Obando, APRN-CNP  Consult ordered by: Bryson Becker MD  Reason for consult: Septic shock, UTI        Primary MD: Alirio Augustine MD        History Of Present Illness  Mariella Cuello is a 79 y.o. female with medical history of Alzheimer's disease.  She presented to the emergency room via EMS with altered mental status.  She was found to be hypotensive started on pressor support.  She was admitted to the ICU for further management.  Patient seen and examined. She is awake, tired.  She has been off pressor support since about 2:15 AM.  She is afebrile, no chills.  She is on room air saturating 95 to 97%.  Labs  with leukocytosis.  Resolving Acute kidney injury. Urinalysis with pyuria.  Urine and blood cultures pending.  CT of the head with no acute findings.  Nonspecific scattered white matter hypodensities noted.  CT of abdomen and pelvis shows thickening and mild adjacent stranding, gallbladder distention, no gallstones or wall thickening evident, gastric wall thickening.  Atelectasis.  Focal wall thickening of cecum.  Chest x-ray no acute findings.  She has history of recurrent urinary tract infections cultures grew E faecalis- susceptible to ampicillin, vancomycin, aerococcus, Proteus, citrobacter, E.coli .  She is on IV vancomycin, Zosyn.    Past Medical History  She has a past medical history of Alzheimer disease (Multi), Anemia, Atherosclerotic heart disease of native coronary artery without angina pectoris (05/12/2021), Bipolar disorder (Multi), Cardiomyopathy (Multi), Chronic systolic (congestive) heart failure (Multi) (05/12/2021), Depression, Dysphagia, Gastritis, History of COVID-19, Hyperlipidemia, Hypertension, Hypothyroidism, Major depressive disorder, recurrent, unspecified (CMS-HCC) (05/12/2021), Obstipation, Respiratory failure (Multi), Schizoaffective disorder (Multi), Spinal stenosis, Stroke (Multi), and  Systolic heart failure (Multi).    Surgical History  She has a past surgical history that includes Tonsillectomy (02/29/2016); Cataract extraction (02/29/2016); Total abdominal hysterectomy (02/29/2016); MR angio head wo IV contrast (01/22/2014); and Coronary artery bypass graft.     Social History     Occupational History    Not on file   Tobacco Use    Smoking status: Never     Passive exposure: Never    Smokeless tobacco: Not on file   Vaping Use    Vaping status: Unknown   Substance and Sexual Activity    Alcohol use: Not on file    Drug use: Not on file    Sexual activity: Not on file     Travel History   Travel since 09/18/24    No documented travel since 09/18/24              Family History  No family history on file.  Allergies  Patient has no known allergies.     Immunization History   Administered Date(s) Administered    Pfizer Purple Cap SARS-CoV-2 10/29/2021     Medications  Home medications:  Medications Prior to Admission   Medication Sig Dispense Refill Last Dose/Taking    buPROPion XL (Wellbutrin XL) 150 mg 24 hr tablet    Taking    buPROPion XL (Wellbutrin XL) 300 mg 24 hr tablet    Taking    acetaminophen (Tylenol) 325 mg tablet Take 2 tablets (650 mg) by mouth every 6 hours if needed for mild pain (1 - 3) or fever (temp greater than 38.0 C).       acetaminophen (Tylenol) 650 mg suppository Insert 1 suppository (650 mg) into the rectum every 6 hours if needed for mild pain (1 - 3) or fever (temp greater than 38.0 C).       albuterol sulfate (Proair Digihaler) 90 mcg/actuation aero powdr breath act w/sensor inhaler Inhale 2 puffs every 6 hours if needed for wheezing.       ALPRAZolam (Xanax) 0.5 mg tablet Take 1 tablet (0.5 mg) by mouth every 8 hours if needed for anxiety. 90 tablet 5     alum-mag hydroxide-simeth (Maalox MAX) 400-400-40 mg/5 mL suspension Take 30 mL by mouth every 4 hours if needed for indigestion or heartburn.       ARIPiprazole (Abilify) 10 mg tablet once every 24 hours.        ARIPiprazole (Abilify) 2 mg tablet Take 1 tablet (2 mg) by mouth once daily.       ARIPiprazole (Abilify) 5 mg tablet Take 1 tablet (5 mg) by mouth once daily.       aspirin 81 mg EC tablet Take 1 tablet (81 mg) by mouth once daily.       atorvastatin (Lipitor) 20 mg tablet Take 1 tablet (20 mg) by mouth once daily.       bisacodyl (Dulcolax) 10 mg suppository Insert 1 suppository (10 mg) into the rectum once daily as needed for constipation.       bisoprolol (Zebeta) 5 mg tablet Take 0.5 tablets (2.5 mg) by mouth 2 times a day.       busPIRone (Buspar) 5 mg tablet Take 1 tablet (5 mg) by mouth 3 times a day.       cholecalciferol (Vitamin D-3) 5,000 Units tablet Take 1 tablet (5,000 Units) by mouth once daily.       dextromethorphan-quinidine (Nuedexta) 20-10 mg capsule Take 1 capsule by mouth once daily.       diclofenac epolamine 1.3 % patch Place 1 patch on the skin every 12 hours.       diclofenac sodium (Voltaren) 1 % gel gel Apply 4.5 inches (4 g) topically every 8 hours if needed (arthritis pain).       docusate sodium (Colace) 100 mg capsule Take 1 capsule (100 mg) by mouth 2 times a day.       furosemide (Lasix) 20 mg tablet Take 1 tablet (20 mg) by mouth once daily. Every Friday       guaiFENesin (Robitussin) 100 mg/5 mL syrup Take 10 mL (200 mg) by mouth every 4 hours if needed for cough.       lamoTRIgine (LaMICtal) 100 mg tablet Take 1 tablet (100 mg) by mouth 2 times a day.       levothyroxine (Synthroid, Levoxyl) 100 mcg tablet Take 88 mcg by mouth once daily.       loperamide (Imodium A-D) 2 mg tablet Take 1 tablet (2 mg) by mouth every 3 hours if needed for diarrhea.       magnesium hydroxide (Milk of Magnesia) 2,400 mg/10 mL suspension suspension Take 10 mL by mouth once daily as needed for constipation.       melatonin 3 mg capsule Take 6 mg by mouth once daily.       ondansetron (Zofran) 4 mg tablet Take 1 tablet (4 mg) by mouth every 8 hours if needed for nausea or vomiting.       potassium  chloride CR 20 mEq ER tablet Take 1 tablet (20 mEq) by mouth once daily.       sennosides (Senokot) 8.6 mg tablet Take 2 tablets (17.2 mg) by mouth once daily.       sodium phosphates (Fleets) 19-7 gram/118 mL enema enema Insert 118 mL (1 enema) into the rectum once daily as needed for constipation.       topiramate (Topamax) 50 mg tablet Take 1 tablet (50 mg) by mouth 2 times a day.       traMADol (Ultram) 50 mg tablet Take 1 tablet (50 mg) by mouth every 12 hours if needed for severe pain (7 - 10).       traZODone (Desyrel) 50 mg tablet Take 0.5 tablets (25 mg) by mouth once daily.       venlafaxine XR (Effexor-XR) 75 mg 24 hr capsule Take 3 capsules (225 mg) by mouth once daily.        Current medications:  Scheduled medications  aspirin, 81 mg, oral, Daily  atorvastatin, 20 mg, oral, Nightly  pantoprazole, 40 mg, oral, Daily before breakfast   Or  esomeprazole, 40 mg, nasoduodenal tube, Daily before breakfast   Or  pantoprazole, 40 mg, intravenous, Daily before breakfast  insulin lispro, 0-5 Units, subcutaneous, q4h  lamoTRIgine, 100 mg, oral, BID  levothyroxine, 88 mcg, oral, Daily  perflutren protein A microsphere, 0.5 mL, intravenous, Once in imaging  piperacillin-tazobactam, 3.375 g, intravenous, q6h  sulfur hexafluoride microsphr, 2 mL, intravenous, Once in imaging  topiramate, 50 mg, oral, BID  vancomycin, 1 g, intravenous, q24h      Continuous medications  heparin, 0-4,000 Units/hr, Last Rate: 0 Units/hr (10/18/24 1201)  norepinephrine, 0-0.2 mcg/kg/min, Last Rate: Stopped (10/18/24 0215)      PRN medications  PRN medications: dextrose, dextrose, glucagon, heparin (porcine), ondansetron, vancomycin    Review of Systems   Constitutional:  Positive for fatigue.   HENT: Negative.     Eyes: Negative.    Respiratory: Negative.     Cardiovascular: Negative.    Gastrointestinal: Negative.    Endocrine: Negative.    Genitourinary: Negative.    Musculoskeletal: Negative.    Skin: Negative.    Neurological:  "Negative.    Psychiatric/Behavioral: Negative.          Objective  Range of Vitals (last 24 hours)  Heart Rate:  []   Temp:  [35.6 °C (96.1 °F)-36.6 °C (97.9 °F)]   Resp:  [12-35]   BP: ()/()   Height:  [162.6 cm (5' 4\")]   Weight:  [81.6 kg (180 lb)-83.3 kg (183 lb 10.3 oz)]   SpO2:  [93 %-98 %]   Daily Weight  10/18/24 : 83.3 kg (183 lb 10.3 oz)    Body mass index is 31.52 kg/m².     Physical Exam  Constitutional:       Comments: Awake, alert to self   HENT:      Head: Normocephalic and atraumatic.      Nose: Nose normal.      Mouth/Throat:      Mouth: Mucous membranes are moist.      Pharynx: Oropharynx is clear.   Eyes:      General: No scleral icterus.  Neck:      Comments: Right internal jugular catheter   Cardiovascular:      Rate and Rhythm: Normal rate and regular rhythm.   Pulmonary:      Effort: Pulmonary effort is normal.      Breath sounds: Normal breath sounds.   Abdominal:      General: Bowel sounds are normal.      Palpations: Abdomen is soft.   Musculoskeletal:         General: Normal range of motion.      Cervical back: Normal range of motion and neck supple.   Skin:     General: Skin is warm and dry.   Neurological:      Comments: Awake, alert to self   Psychiatric:         Mood and Affect: Mood normal.         Behavior: Behavior normal.        Relevant Results  Outside Hospital Results  No  Labs  Results from last 72 hours   Lab Units 10/18/24  0403 10/17/24  1927   WBC AUTO x10*3/uL 15.5* 10.2   HEMOGLOBIN g/dL 15.5 17.7*   HEMATOCRIT % 46.0 52.5*   PLATELETS AUTO x10*3/uL 228 306   NEUTROS PCT AUTO %  --  68.6   LYMPHS PCT AUTO %  --  27.3   MONOS PCT AUTO %  --  3.2   EOS PCT AUTO %  --  0.2     Results from last 72 hours   Lab Units 10/18/24  0403 10/17/24  1927   SODIUM mmol/L 134* 138   POTASSIUM mmol/L 3.7 4.2   CHLORIDE mmol/L 106 104   CO2 mmol/L 19* 21   BUN mg/dL 21 16   CREATININE mg/dL 1.16* 1.45*   GLUCOSE mg/dL 109* 135*   CALCIUM mg/dL 9.4 10.5*   ANION GAP " "mmol/L 13 17   EGFR mL/min/1.73m*2 48* 37*     Results from last 72 hours   Lab Units 10/18/24  0403 10/17/24  1927   ALK PHOS U/L 94 116   BILIRUBIN TOTAL mg/dL 0.5 0.6   PROTEIN TOTAL g/dL 6.6 7.9   ALT U/L 18 23   AST U/L 31 29   ALBUMIN g/dL 3.5 4.3     Estimated Creatinine Clearance: 40.4 mL/min (A) (by C-G formula based on SCr of 1.16 mg/dL (H)).  No results found for: \"CRP\", \"SEDRATE\"  No results found for: \"HIV1X2\", \"HIVCONF\", \"QZNMFE5FR\"  No results found for: \"HEPCABINIT\", \"HEPCAB\", \"HCVPCRQUANT\"  Microbiology  Urine culture pending  Blood culture pending  MRSA PCR pending      Imaging  Electrocardiogram, 12-lead ACS symptoms    Result Date: 10/18/2024  Normal sinus rhythm Nonspecific intraventricular block Possible Lateral infarct , age undetermined Abnormal ECG When compared with ECG of 18-DEC-2023 21:29, QRS axis Shifted left Inverted T waves have replaced nonspecific T wave abnormality in Anterior leads QT has lengthened    CT abdomen pelvis w IV contrast    Result Date: 10/17/2024  Interpreted By:  Finkelstein, Evan, STUDY: CT ABDOMEN PELVIS W IV CONTRAST;  10/17/2024 10:52 pm   INDICATION: Signs/Symptoms:concern for sepsis.     COMPARISON: None.   ACCESSION NUMBER(S): [id number]   ORDERING CLINICIAN: TEREZA REYES   TECHNIQUE: Axial CT images of the abdomen and pelvis with coronal and sagittal reconstructed images obtained after intravenous administration of 75 mL Omnipaque 350   FINDINGS: LOWER CHEST: Emphysematous changes in the visualized lung bases with bibasilar opacities.   ABDOMEN:   LIVER: Normal attenuation and contour. BILE DUCTS: Normal caliber. GALLBLADDER: The gallbladder is distended without wall thickening or pericholecystic fluid. No calcified gallstones are evident. PORTAL VEIN: Patent SPLEEN: Unremarkable. PANCREAS: Unremarkable. ADRENALS: Unremarkable. KIDNEYS, URETERS and URINARY BLADDER: Symmetric renal enhancement. No hydronephrosis or perinephric fluid collection. " Subcentimeter hypodensities in the kidneys are too small to characterize. A Carr catheter partially decompresses the bladder. There is associated wall thickening with mild adjacent stranding. Lucencies in the bladder likely related to instrumentation. REPRODUCTIVE ORGANS: Status post hysterectomy. Small amount of free pelvic fluid.   ABDOMINAL WALL: Within normal limits. PERITONEUM: No ascites, free air or fluid collection.   BOWEL: Gastric wall thickening. No small or large bowel dilatation. Scattered colonic diverticula. No pericolonic inflammatory stranding. Focal wall thickening of the cecum best seen on coronal image 39 of series 5.   VESSELS: No aortic aneurysm. Moderate aortoiliac calcifications. RETROPERITONEUM: No pathologically enlarged retroperitoneal lymph nodes.   BONES: No acute osseous abnormality.       A Carr catheter partially decompresses the urinary bladder, which limits evaluation. There is, however, associated wall thickening with mild adjacent stranding. Recommend correlation with urinalysis as findings may be seen with cystitis.   There is gallbladder distention, however, no calcified gallstones or wall thickening are evident.   Gastric wall thickening. Correlate for symptoms of gastritis.   Partially imaged bibasilar opacities favored to represent atelectasis. Developing infection is not excluded in the appropriate clinical setting.   Scattered colonic diverticulosis. No CT evidence of acute diverticulitis.   Focal wall thickening of the cecum best seen on coronal image 39 of series 5, with appearance suspicious for a colonic mass. Recommend nonemergent colonoscopy to further evaluate.   MACRO: None.   Signed by: Evan Finkelstein 10/17/2024 11:26 PM Dictation workstation:   [id number]    CT head wo IV contrast    Result Date: 10/17/2024  Interpreted By:  Finkelstein, Evan, STUDY: CT HEAD WO IV CONTRAST;  10/17/2024 10:59 pm   INDICATION: Signs/Symptoms:AMS.     COMPARISON: CT brain  12/18/2023   ACCESSION NUMBER(S): [id number]   ORDERING CLINICIAN: TEREZA REYES   TECHNIQUE: Axial noncontrast CT images of the head with coronal and sagittal reconstructions.   FINDINGS: EXTRACRANIAL SOFT TISSUES: Unremarkable.   CALVARIUM: No depressed skull fracture. No destructive osseous lesion.   PARANASAL SINUSES/MASTOIDS: The visualized paranasal sinuses and mastoid air cells are aerated.   HEMORRHAGE: No acute intracranial hemorrhage.   BRAIN PARENCHYMA: Gray-white matter interfaces are preserved. No mass effect or midline shift. Aneurysmal coils at the skull base are again seen with adjacent streak artifact limiting evaluation. There are nonspecific scattered white matter hypodensities.   VENTRICLES and EXTRA-AXIAL SPACES: Parenchymal atrophy with prominence of the ventricles and cortical sulci.   OTHER FINDINGS: None.       No acute intracranial hemorrhage, mass effect or midline shift.   Nonspecific scattered white matter hypodensities favored to represent sequela of small vessel ischemia.       MACRO: None.   Signed by: Evan Finkelstein 10/17/2024 11:16 PM Dictation workstation:   [id number]    XR chest 1 view    Result Date: 10/17/2024  Interpreted By:  Ivana Levy, STUDY: XR CHEST 1 VIEW;  10/17/2024 8:50 pm   INDICATION: Signs/Symptoms:central line placement, right IJ.   COMPARISON: 10/17/2024 at 7:35 p.m.   ACCESSION NUMBER(S): [id number]   ORDERING CLINICIAN: MELBA PARK   FINDINGS: Right IJ central venous catheter tip is at the level of the mid SVC.   CARDIOMEDIASTINAL SILHOUETTE: Cardiomediastinal silhouette is normal in size and configuration.   LUNGS: No pulmonary consolidation, pleural effusion or pneumothorax. Stable chronic parenchymal scarring/fibrotic changes.   ABDOMEN: No remarkable upper abdominal findings.   BONES: No acute osseous abnormality.       Right IJ central venous catheter tip is at the level of the mid SVC.   No acute cardiopulmonary process.   MACRO: None    Signed by: Ivana Levy 10/17/2024 9:23 PM Dictation workstation:   [id number]    XR chest 1 view    Result Date: 10/17/2024  Interpreted By:  Dev Burch, STUDY: XR CHEST 1 VIEW;  10/17/2024 7:46 pm   INDICATION: Signs/Symptoms:AMS.     COMPARISON: 12/18/2023   ACCESSION NUMBER(S): [id number]   ORDERING CLINICIAN: TEREZA REYES   FINDINGS: PA and lateral radiographs of the chest were provided.       CARDIOMEDIASTINAL SILHOUETTE: Cardiomediastinal silhouette is normal in size and configuration.   LUNGS: Bibasilar atelectasis versus infiltrate.   ABDOMEN: No remarkable upper abdominal findings.   BONES: No acute osseous changes. There are sternotomy wires noted.       1.  Bibasilar atelectasis versus infiltrate.       MACRO: None   Signed by: Dev Burch 10/17/2024 7:55 PM Dictation workstation:   [id number]      Assessment/Plan   Septic shock, resolving-off pressor support  Acute Metabolic encephalopathy-possibly secondary to infection  Acute kidney injury, improving   Abnormal CT abdomen-finding concern for colonic mass-GI consulted  History of E faecalis- susceptible to ampicillin, vancomycin, aerococcus, citrobacter, Proteus.      IV Vancomycin  IV Zosyn  Monitor temperature and WBC  Follow urine culture  Follow blood culture  Monitor blood pressure  Monitor mentation  Monitor renal function  Further recommendations based on workup    Discussed with Dr. Amaya     Total time spent caring for the patient today was 35 minutes. This includes time spent before the visit reviewing the chart, time spent during the visit, and time spent after the visit on documentation.       Luci Obando, APRN-CNP

## 2024-10-18 NOTE — CONSULTS
Inpatient consult to Cardiology  Consult performed by: Nirmala Hammond MD  Consult ordered by: Bryson Becker MD  Reason for consult: Heart failure, elevated troponin, shock        History Of Present Illness:    Mariella Cuello is a 79 y.o. female presenting with weakness.  Patient with history of ischemic cardiomyopathy status post two-vessel CABG in 2020 LIMA to LAD and SVG graft to left circumflex.  Patient has known nonischemic cardiomyopathy ejection fraction of 30 to 35%.  No ICD.  Last echocardiogram was in March 2022.  Patient presents with a complains of weakness.  Was found to be hypotensive she is currently has a urinary tract infection with sepsis.  I was asked to see her because her troponin were elevated.  EKG showing new left bundle with T wave inversion.  Patient denies having any chest pain.  No shortness of breath.  Lying comfortable in bed.  Not great historian.    Review of systems.  10 point review of systems otherwise negative     Last Recorded Vitals:  Vitals:    10/18/24 1000 10/18/24 1100 10/18/24 1200 10/18/24 1220   BP: 122/67 117/74 (!) 138/97 127/72   BP Location:    Left arm   Patient Position:    Lying   Pulse: 87 93 93 95   Resp: 23 24 25 17   Temp:    36.5 °C (97.7 °F)   TempSrc:    Temporal   SpO2: 95% 95% 95% 96%   Weight:       Height:           Last Labs:  CBC - 10/18/2024:  4:03 AM  15.5 15.5 228    46.0      CMP - 10/18/2024:  4:03 AM  9.4 6.6 31 --- 0.5   _ 3.5 18 94      PTT - 10/18/2024: 10:54 AM  1.1   11.4 100.4     Troponin I, High Sensitivity   Date/Time Value Ref Range Status   10/18/2024 04:03  (HH) 0 - 13 ng/L Final     Comment:     Previous result verified on 10/17/2024 2012 on specimen/case [id number] [facility] Troponin I, High Sensitivity with value 999 ng/L.   10/18/2024 01:01  (HH) 0 - 13 ng/L Final     Comment:     Previous result verified on 10/17/2024 2012 on specimen/case [id number] called with  component [facility] for procedure Troponin I, High Sensitivity with value 999 ng/L.   10/17/2024 09:17  (HH) 0 - 13 ng/L Final     Comment:     Previous result verified on 10/17/2024 2012 on specimen/case [id number] called with component [facility] for procedure Troponin I, High Sensitivity with value 999 ng/L.     BNP   Date/Time Value Ref Range Status   10/17/2024 07:41 PM 1,995 (H) 0 - 99 pg/mL Final     Hemoglobin A1C   Date/Time Value Ref Range Status   03/01/2022 09:36 AM 4.9 4.3 - 5.6 % Final     Comment:     American Diabetes Association guidelines indicate that patients with HgbA1c in the range 5.7-6.4% are at increased risk for development of diabetes, and intervention by lifestyle modification may be beneficial. HgbA1c greater or equal to 6.5% is considered diagnostic of diabetes.   02/19/2021 06:17 AM 4.6 4.3 - 5.6 % Final     Comment:     American Diabetes Association guidelines indicate that patients with HgbA1c in   the range 5.7-6.4% are at increased risk for development of diabetes, and   intervention by lifestyle modification may be beneficial. HgbA1c greater or   equal to 6.5% is considered diagnostic of diabetes.     LDL Calculated   Date/Time Value Ref Range Status   10/02/2023 08:25 AM 66 (L) 140 - 190 mg/dL Final     Comment:                                 Near   Borderline      AGE      Desirable  Optimal    High     High     Very High     0-19 Y     0 - 109     ---    110-129   >/= 130     ----    20-24 Y     0 - 119     ---    120-159   >/= 160     ----      >24 Y     0 -  99   100-129  130-159   160-189     >/=190     04/03/2023 07:26 AM 50 (L) 65 - 130 MG/DL Final   10/03/2022 07:00 AM 20 (L) 65 - 130 MG/DL Final   06/04/2020 10:24 AM 53 (L) 65 - 130 MG/DL Final     VLDL   Date/Time Value Ref Range Status   10/02/2023 08:25 AM 19 0 - 40 mg/dL Final   10/11/2021 10:57 AM 20 0 - 40 mg/dL Final      Last I/O:  I/O last 3 completed shifts:  In: 959 (11.5 mL/kg) [I.V.:175.7 (2.1 mL/kg); IV  "Piggyback:783.3]  Out: 275 (3.3 mL/kg) [Urine:275 (0.1 mL/kg/hr)]  Weight: 83.3 kg     Past Cardiology Tests (Last 3 Years):  EKG:  Electrocardiogram, 12-lead ACS symptoms 10/17/2024 (Preliminary)      ECG 12 Lead       ECG 12 lead 12/18/2023    Echo:  No results found for this or any previous visit from the past 1095 days.    Ejection Fractions:  No results found for: \"EF\"  Cath:  No results found for this or any previous visit from the past 1095 days.    Stress Test:  No results found for this or any previous visit from the past 1095 days.    Cardiac Imaging:  No results found for this or any previous visit from the past 1095 days.      Past Medical History:  She has a past medical history of Alzheimer disease (Multi), Anemia, Atherosclerotic heart disease of native coronary artery without angina pectoris (05/12/2021), Bipolar disorder (Multi), Cardiomyopathy (Multi), Chronic systolic (congestive) heart failure (Multi) (05/12/2021), Depression, Dysphagia, Gastritis, History of COVID-19, Hyperlipidemia, Hypertension, Hypothyroidism, Major depressive disorder, recurrent, unspecified (CMS-HCC) (05/12/2021), Obstipation, Respiratory failure (Multi), Schizoaffective disorder (Multi), Spinal stenosis, Stroke (Multi), and Systolic heart failure (Multi).    Past Surgical History:  She has a past surgical history that includes Tonsillectomy (02/29/2016); Cataract extraction (02/29/2016); Total abdominal hysterectomy (02/29/2016); MR angio head wo IV contrast (01/22/2014); and Coronary artery bypass graft.      Social History:  She reports that she has never smoked. She has never been exposed to tobacco smoke. She does not have any smokeless tobacco history on file. No history on file for alcohol use and drug use.    Family History:  No family history on file.     Allergies:  Patient has no known allergies.    Inpatient Medications:  Scheduled medications   Medication Dose Route Frequency    aspirin  81 mg oral Daily    " atorvastatin  20 mg oral Nightly    pantoprazole  40 mg oral Daily before breakfast    Or    esomeprazole  40 mg nasoduodenal tube Daily before breakfast    Or    pantoprazole  40 mg intravenous Daily before breakfast    insulin lispro  0-5 Units subcutaneous q4h    lamoTRIgine  100 mg oral BID    levothyroxine  88 mcg oral Daily    perflutren protein A microsphere  0.5 mL intravenous Once in imaging    piperacillin-tazobactam  3.375 g intravenous q6h    sulfur hexafluoride microsphr  2 mL intravenous Once in imaging    topiramate  50 mg oral BID    vancomycin  1 g intravenous q24h     PRN medications   Medication    dextrose    dextrose    glucagon    heparin (porcine)    ondansetron    vancomycin     Continuous Medications   Medication Dose Last Rate    heparin  0-4,000 Units/hr 500 Units/hr (10/18/24 1301)    norepinephrine  0-0.2 mcg/kg/min Stopped (10/18/24 0727)     Outpatient Medications:  Current Outpatient Medications   Medication Instructions    acetaminophen (TYLENOL) 650 mg, oral, Every 6 hours PRN    acetaminophen (TYLENOL) 650 mg, rectal, Every 6 hours PRN    albuterol sulfate (Proair Digihaler) 90 mcg/actuation aero powdr breath act w/sensor inhaler 2 puffs, inhalation, Every 6 hours PRN    ALPRAZolam (XANAX) 0.5 mg, oral, Every 8 hours PRN    alum-mag hydroxide-simeth (Maalox MAX) 400-400-40 mg/5 mL suspension 30 mL, oral, Every 4 hours PRN    ARIPiprazole (Abilify) 10 mg tablet Every 24 hours    ARIPiprazole (ABILIFY) 2 mg, oral, Daily    ARIPiprazole (ABILIFY) 5 mg, oral, Daily    aspirin 81 mg, oral, Daily    atorvastatin (LIPITOR) 20 mg, oral, Daily    bisacodyl (DULCOLAX) 10 mg, rectal, Daily PRN    bisoprolol (ZEBETA) 2.5 mg, oral, 2 times daily    buPROPion XL (Wellbutrin XL) 150 mg 24 hr tablet     buPROPion XL (Wellbutrin XL) 300 mg 24 hr tablet     busPIRone (BUSPAR) 5 mg, oral, 3 times daily    cholecalciferol (VITAMIN D-3) 5,000 Units, oral, Daily    dextromethorphan-quinidine (Nuedexta)  20-10 mg capsule 1 capsule, oral, Daily    diclofenac epolamine 1.3 % patch 1 patch, transdermal, Every 12 hours    diclofenac sodium (VOLTAREN) 4 g, Topical, Every 8 hours PRN    docusate sodium (COLACE) 100 mg, oral, 2 times daily    furosemide (LASIX) 20 mg, oral, Daily, Every Friday    guaiFENesin (ROBITUSSIN) 200 mg, oral, Every 4 hours PRN    lamoTRIgine (LAMICTAL) 100 mg, oral, 2 times daily    levothyroxine (SYNTHROID, LEVOXYL) 88 mcg, oral, Daily    loperamide (IMODIUM A-D) 2 mg, oral, Every 3 hours PRN    magnesium hydroxide (Milk of Magnesia) 2,400 mg/10 mL suspension suspension 10 mL, oral, Daily PRN    melatonin 6 mg, oral, Daily    ondansetron (ZOFRAN) 4 mg, oral, Every 8 hours PRN    potassium chloride CR 20 mEq ER tablet 20 mEq, oral, Daily    sennosides (Senokot) 8.6 mg tablet 2 tablets, oral, Daily    sodium phosphates (Fleets) 19-7 gram/118 mL enema enema 1 enema, rectal, Daily PRN    topiramate (TOPAMAX) 50 mg, oral, 2 times daily    traMADol (ULTRAM) 50 mg, oral, Every 12 hours PRN    traZODone (DESYREL) 25 mg, oral, Daily    venlafaxine XR (EFFEXOR-XR) 225 mg, oral, Daily       Physical Exam:  General: Patient is in no acute distress.  Obtunded  HEENT: atraumatic normocephalic.  Neck: is supple jugular venous pressure within normal limits no thyromegaly.  Cardiovascular regular rate and rhythm normal heart sounds no murmurs rubs or gallops.  Lungs: clear to auscultation bilaterally.  Abdomen: is soft nontender.  Extremities warm to touch no edema.  Neurologic examination: Obtunded arousable poor historian.  Psychiatric examination: Patient is poor historian.  Pulses 2+ intact bilaterally     Assessment/Plan   #1 shock.  Patient presented to the hospital with what appears to be septic shock.  Doubt she is in cardiogenic shock.  Currently she is off pressors.  She has urinary tract infection with sepsis.  EKG does show new left bundle branch morphology and she has T wave inversion in  anterolateral leads.  Troponin elevated.  Currently on heparin drip.  For now my recommendation is to continue heparin drip.  Will treat her cardiogenic shock.  Await 2D echo for final recommendation.    2.  Chronic systolic heart failure ejection fraction of 30 to 35%.  Patient had no ICD placed.  Had coronary disease status post CABG in 2020.  At this point we will optimize her treatment for heart failure systolic dysfunction.  We will hold antihypertensive medications for the time being we will reassess in AM.    3.  Hyperlipidemia recommend high intensity statin.    4.  Coronary artery disease status post CABG recommend baby aspirin and addition of high intensity statin.    5.  Acute on chronic renal failure management by primary team.    Thank you for the consult  CVC 10/17/24 Triple lumen Right Internal jugular (Active)   Site Assessment Bleeding 10/18/24 0900   Dressing Status Old drainage;New drainage;Other (Comment) 10/18/24 0900   Number of days: 1       Peripheral IV 10/17/24 20 G Right Antecubital (Active)   Site Assessment Clean;Dry;Intact 10/18/24 0900   Dressing Status Clean;Dry;Occlusive 10/18/24 0900   Number of days: 1       Peripheral IV 10/17/24 22 G Left;Anterior Hand (Active)   Site Assessment Clean;Dry;Intact 10/18/24 0900   Dressing Status Clean;Dry;Occlusive 10/18/24 0900   Number of days: 1       Urethral Catheter Non-latex;Straight-tip 16 Fr. (Active)   Site Assessment Clean;Skin intact 10/18/24 1252   Number of days: 1       Code Status:  DNR and No Intubation      Nirmala Hammond MD

## 2024-10-18 NOTE — ASSESSMENT & PLAN NOTE
She has been treated as acute coronary syndrome.  Continue heparin drip.  No beta-blocker because of recent hypotension

## 2024-10-18 NOTE — ASSESSMENT & PLAN NOTE
Suspected sepsis secondary to UTI  She has been weaned off Levophed  Continue broad-spectrum antibiotic coverage: On IV Zosyn and vancomycin.

## 2024-10-18 NOTE — PROGRESS NOTES
Therapy Communication Note    Patient Name: Mariella Cuello  MRN: [id number]  Today's Date: 10/18/2024    Discipline: Physical Therapy    Missed Visit Reason:      Missed Time: Cancel    Comment: Discussed case with RN who requests therapy cancel eval for today. Per RN, elevated PTT+Tp with active bleeding. Team working on Heparin management. Pending cardiac consult.

## 2024-10-18 NOTE — ASSESSMENT & PLAN NOTE
Concern for colonic mass in the cecum on CT abdomen and pelvis done on 10/17  Gastroenterology consult

## 2024-10-18 NOTE — PROGRESS NOTES
Pharmacy to Dose Vancomycin - Initial Consult    Mariella Cuello is a 79 y.o. female admitted for Sepsis with encephalopathy and septic shock, due to unspecified organism (Multi).    Pharmacy has been consulted for vancomycin dosing and monitoring for UTI.     Assessment  Vital signs reviewed, including temperature, HR, BP, I/Os.   Available lab results reviewed, including:WBC, serum creatinine, and BUN.  Baseline risks associated with therapy include: pre-existing renal impairment and advanced age.           Plan     Orders placed:loading dose followed by scheduled dosing.  Await culture results for growth and sensitivity.  Daily serum creatinine while receiving vancomycin.   Follow for continued vancomycin need, clinical response, and s/s of toxicity.   Expected vancomycin duration: 7 days of total therapy, to be completed on 10/25.  Will consider vancomycin trough level before 5th dose only if therapy expected to continue or if significant renal function change.     Trey Webb, PharmD

## 2024-10-19 LAB
ALBUMIN SERPL BCP-MCNC: 3.3 G/DL (ref 3.4–5)
ANION GAP SERPL CALCULATED.3IONS-SCNC: 11 MMOL/L (ref 10–20)
AORTIC VALVE MEAN GRADIENT: 2 MMHG
AORTIC VALVE PEAK VELOCITY: 0.9 M/S
AV PEAK GRADIENT: 3.2 MMHG
AVA (PEAK VEL): 3.77 CM2
AVA (VTI): 3.03 CM2
BACTERIA UR CULT: ABNORMAL
BASOPHILS # BLD AUTO: 0.08 X10*3/UL (ref 0–0.1)
BASOPHILS NFR BLD AUTO: 0.6 %
BUN SERPL-MCNC: 19 MG/DL (ref 6–23)
CALCIUM SERPL-MCNC: 9.2 MG/DL (ref 8.6–10.3)
CARDIAC TROPONIN I PNL SERPL HS: 277 NG/L (ref 0–13)
CHLORIDE SERPL-SCNC: 108 MMOL/L (ref 98–107)
CO2 SERPL-SCNC: 23 MMOL/L (ref 21–32)
CREAT SERPL-MCNC: 1.06 MG/DL (ref 0.5–1.05)
EGFRCR SERPLBLD CKD-EPI 2021: 53 ML/MIN/1.73M*2
EJECTION FRACTION APICAL 4 CHAMBER: 40
EJECTION FRACTION: 40 %
EOSINOPHIL # BLD AUTO: 0.08 X10*3/UL (ref 0–0.4)
EOSINOPHIL NFR BLD AUTO: 0.6 %
ERYTHROCYTE [DISTWIDTH] IN BLOOD BY AUTOMATED COUNT: 13.2 % (ref 11.5–14.5)
GLUCOSE BLD MANUAL STRIP-MCNC: 100 MG/DL (ref 74–99)
GLUCOSE BLD MANUAL STRIP-MCNC: 101 MG/DL (ref 74–99)
GLUCOSE BLD MANUAL STRIP-MCNC: 103 MG/DL (ref 74–99)
GLUCOSE BLD MANUAL STRIP-MCNC: 112 MG/DL (ref 74–99)
GLUCOSE BLD MANUAL STRIP-MCNC: 86 MG/DL (ref 74–99)
GLUCOSE BLD MANUAL STRIP-MCNC: 97 MG/DL (ref 74–99)
GLUCOSE SERPL-MCNC: 96 MG/DL (ref 74–99)
HCT VFR BLD AUTO: 42.1 % (ref 36–46)
HGB BLD-MCNC: 14.2 G/DL (ref 12–16)
IMM GRANULOCYTES # BLD AUTO: 0.04 X10*3/UL (ref 0–0.5)
IMM GRANULOCYTES NFR BLD AUTO: 0.3 % (ref 0–0.9)
LEFT ATRIUM VOLUME AREA LENGTH INDEX BSA: 16.7 ML/M2
LEFT VENTRICLE INTERNAL DIMENSION DIASTOLE: 3.33 CM (ref 3.5–6)
LEFT VENTRICULAR OUTFLOW TRACT DIAMETER: 2.4 CM
LV EJECTION FRACTION BIPLANE: 42 %
LYMPHOCYTES # BLD AUTO: 4.1 X10*3/UL (ref 0.8–3)
LYMPHOCYTES NFR BLD AUTO: 31.1 %
MAGNESIUM SERPL-MCNC: 1.98 MG/DL (ref 1.6–2.4)
MCH RBC QN AUTO: 32.3 PG (ref 26–34)
MCHC RBC AUTO-ENTMCNC: 33.7 G/DL (ref 32–36)
MCV RBC AUTO: 96 FL (ref 80–100)
MONOCYTES # BLD AUTO: 0.98 X10*3/UL (ref 0.05–0.8)
MONOCYTES NFR BLD AUTO: 7.4 %
NEUTROPHILS # BLD AUTO: 7.92 X10*3/UL (ref 1.6–5.5)
NEUTROPHILS NFR BLD AUTO: 60 %
NRBC BLD-RTO: 0 /100 WBCS (ref 0–0)
PHOSPHATE SERPL-MCNC: 2.6 MG/DL (ref 2.5–4.9)
PLATELET # BLD AUTO: 223 X10*3/UL (ref 150–450)
POTASSIUM SERPL-SCNC: 3.6 MMOL/L (ref 3.5–5.3)
RBC # BLD AUTO: 4.4 X10*6/UL (ref 4–5.2)
RIGHT VENTRICLE FREE WALL PEAK S': 12.5 CM/S
SODIUM SERPL-SCNC: 138 MMOL/L (ref 136–145)
STAPHYLOCOCCUS SPEC CULT: ABNORMAL
VANCOMYCIN SERPL-MCNC: 14.4 UG/ML (ref 5–20)
WBC # BLD AUTO: 13.2 X10*3/UL (ref 4.4–11.3)

## 2024-10-19 PROCEDURE — 99232 SBSQ HOSP IP/OBS MODERATE 35: CPT | Performed by: INTERNAL MEDICINE

## 2024-10-19 PROCEDURE — 83735 ASSAY OF MAGNESIUM: CPT | Performed by: INTERNAL MEDICINE

## 2024-10-19 PROCEDURE — 2020000001 HC ICU ROOM DAILY

## 2024-10-19 PROCEDURE — 2500000001 HC RX 250 WO HCPCS SELF ADMINISTERED DRUGS (ALT 637 FOR MEDICARE OP): Performed by: INTERNAL MEDICINE

## 2024-10-19 PROCEDURE — 80069 RENAL FUNCTION PANEL: CPT | Performed by: INTERNAL MEDICINE

## 2024-10-19 PROCEDURE — 97161 PT EVAL LOW COMPLEX 20 MIN: CPT | Mod: GP

## 2024-10-19 PROCEDURE — 82947 ASSAY GLUCOSE BLOOD QUANT: CPT

## 2024-10-19 PROCEDURE — 2500000001 HC RX 250 WO HCPCS SELF ADMINISTERED DRUGS (ALT 637 FOR MEDICARE OP): Performed by: STUDENT IN AN ORGANIZED HEALTH CARE EDUCATION/TRAINING PROGRAM

## 2024-10-19 PROCEDURE — 2500000002 HC RX 250 W HCPCS SELF ADMINISTERED DRUGS (ALT 637 FOR MEDICARE OP, ALT 636 FOR OP/ED): Performed by: STUDENT IN AN ORGANIZED HEALTH CARE EDUCATION/TRAINING PROGRAM

## 2024-10-19 PROCEDURE — 92526 ORAL FUNCTION THERAPY: CPT | Mod: GN | Performed by: SPEECH-LANGUAGE PATHOLOGIST

## 2024-10-19 PROCEDURE — 84484 ASSAY OF TROPONIN QUANT: CPT | Performed by: INTERNAL MEDICINE

## 2024-10-19 PROCEDURE — 99233 SBSQ HOSP IP/OBS HIGH 50: CPT | Performed by: INTERNAL MEDICINE

## 2024-10-19 PROCEDURE — 97530 THERAPEUTIC ACTIVITIES: CPT | Mod: GP

## 2024-10-19 PROCEDURE — 2500000004 HC RX 250 GENERAL PHARMACY W/ HCPCS (ALT 636 FOR OP/ED): Performed by: STUDENT IN AN ORGANIZED HEALTH CARE EDUCATION/TRAINING PROGRAM

## 2024-10-19 PROCEDURE — 80202 ASSAY OF VANCOMYCIN: CPT | Performed by: STUDENT IN AN ORGANIZED HEALTH CARE EDUCATION/TRAINING PROGRAM

## 2024-10-19 PROCEDURE — 85025 COMPLETE CBC W/AUTO DIFF WBC: CPT | Performed by: INTERNAL MEDICINE

## 2024-10-19 RX ORDER — METOPROLOL SUCCINATE 25 MG/1
25 TABLET, EXTENDED RELEASE ORAL DAILY
Status: DISCONTINUED | OUTPATIENT
Start: 2024-10-19 | End: 2024-10-22 | Stop reason: HOSPADM

## 2024-10-19 RX ORDER — VANCOMYCIN 1.75 G/350ML
1250 INJECTION, SOLUTION INTRAVENOUS EVERY 24 HOURS
Status: DISCONTINUED | OUTPATIENT
Start: 2024-10-19 | End: 2024-10-19

## 2024-10-19 RX ORDER — MUPIROCIN 20 MG/G
OINTMENT TOPICAL 2 TIMES DAILY
Status: DISCONTINUED | OUTPATIENT
Start: 2024-10-19 | End: 2024-10-22 | Stop reason: HOSPADM

## 2024-10-19 ASSESSMENT — COGNITIVE AND FUNCTIONAL STATUS - GENERAL
MOBILITY SCORE: 9
MOVING FROM LYING ON BACK TO SITTING ON SIDE OF FLAT BED WITH BEDRAILS: A LOT
TURNING FROM BACK TO SIDE WHILE IN FLAT BAD: A LOT
CLIMB 3 TO 5 STEPS WITH RAILING: TOTAL
STANDING UP FROM CHAIR USING ARMS: A LOT
MOVING TO AND FROM BED TO CHAIR: TOTAL
WALKING IN HOSPITAL ROOM: TOTAL

## 2024-10-19 ASSESSMENT — PAIN SCALES - GENERAL
PAINLEVEL_OUTOF10: 0 - NO PAIN
PAINLEVEL_OUTOF10: 0 - NO PAIN

## 2024-10-19 ASSESSMENT — PAIN - FUNCTIONAL ASSESSMENT
PAIN_FUNCTIONAL_ASSESSMENT: FLACC (FACE, LEGS, ACTIVITY, CRY, CONSOLABILITY)
PAIN_FUNCTIONAL_ASSESSMENT: 0-10

## 2024-10-19 ASSESSMENT — PAIN SCALES - WONG BAKER: WONGBAKER_NUMERICALRESPONSE: HURTS LITTLE BIT

## 2024-10-19 NOTE — PROGRESS NOTES
Physical Therapy    Physical Therapy Evaluation    Patient Name: Mariella Cuello  MRN: [id number]  Department: [facility] 4 ICU  [facility]  Today's Date: 10/19/2024   Time Calculation  Start Time: 1141  Stop Time: 1216  Time Calculation (min): 35 min    Assessment/Plan   PT Assessment  PT Assessment Results: Decreased strength, Decreased endurance, Decreased mobility, Impaired balance, Decreased cognition, Impaired judgement, Decreased safety awareness  Rehab Prognosis: Fair  Barriers to Discharge: LTC resident  Evaluation/Treatment Tolerance: Patient limited by fatigue  Medical Staff Made Aware: Yes  End of Session Communication: Bedside nurse  End of Session Patient Position: Up in chair, Alarm on      Assessment Comment: 78 y/o female who presented from nursing facility with AMS. Admitted for further management of encephalopathy and septic shock. Heparin dri phad been initiated, since dc. Pt reports to be at a supervision level with bed to wc transfers at facility. Pt is a questionable historian. tolerated transfer to chair this date with tee steady. Requires assist x 2 for safe mobility. WIll cont to follow at a low frequency to promote sfae mobility while in-house .        IP OR SWING BED PT PLAN  Inpatient or Swing Bed: Inpatient  PT Plan  Treatment/Interventions: Bed mobility, Transfer training, Balance training, Neuromuscular re-education, Strengthening, Endurance training, Therapeutic exercise, Therapeutic activity  PT Plan: Ongoing PT  PT Frequency: 3 times per week  PT Discharge Recommendations: Other (comment) (Return to LTC facility)  PT Recommended Transfer Status: Assist x2 (tee steady)  PT - OK to Discharge: Yes    Subjective   General Visit Information:  General  Reason for Referral: impaired mobility; AMS  Past Medical History Relevant to Rehab: including but not limited to alzheimier's, anemia, depression, HTN, hypothyroidism  Family/Caregiver Present: Yes  Caregiver Feedback: family there at  beginning of session; left  Prior to Session Communication: Bedside nurse  Patient Position Received: Bed, 3 rail up, Alarm on  Preferred Learning Style: verbal  General Comment: 80 y/o female who presented from nursing facility for AMS. Pt supine in bed upon arrival. Agreeable to participate. Cleared for mobility per nursing. gina Lopez.  Home Living:  Home Living  Type of Home: Long Term Care facility  Home Living Comments: per EMR, pt from LTC facility  Prior Level of Function:  Prior Function Per Pt/Caregiver Report  Prior Function Comments: per pt, who is a questionable historian, pt performs wc transfers indepedently at facility, however is non-ambulatory. Questionable accuracy.  Precautions:  Precautions  Medical Precautions: Fall precautions      Vital Signs Comment: Pre PT: HR 97, 91% SpO2.  During activity , SpO2 93%     Objective   Pain:  Pain Assessment  Pain Assessment: FLACC (Face, Legs, Activity, Cry, Consolability)  0-10 (Numeric) Pain Score:  (no observed signs of pain with mobility)  Cognition:  Cognition  Cognition Comments: Flat affect. Minimal interaction and verbalizations. Able to follow most simple, motor commands with delayed processing and initiation of tasks. Difficulty with complex commands.  Insight: Moderate  Processing Speed: Delayed    General Assessments:  General Observation  General Observation: appeared to have anxious tendencies; increase dRR and HR     Activity Tolerance  Endurance: Decreased tolerance for upright activites  Activity Tolerance Comments: increased HR, observed SOB and increased RR; cueing to promote pursed-lip breathing    Sensation  Sensation Comment: difficult to assess    Strength  Strength Comments: B LEs > 3/5 observed       Postural Control  Posture Comment: flexed posture, rounded shoulders    Static Sitting Balance  Static Sitting-Level of Assistance: Contact guard  Static Sitting-Comment/Number of Minutes: sitting on EOB, flexed posture  Dynamic  Sitting Balance  Dynamic Sitting-Comments: assist to scoot towards EOB    Static Standing Balance  Static Standing-Level of Assistance: Moderate assistance  Static Standing-Comment/Number of Minutes: B arm in arm assist  Dynamic Standing Balance  Dynamic Standing-Comments: Unable to advance LEs laterally to transfer to chair. Mod assist.  Functional Assessments:  Bed Mobility  Bed Mobility: Yes  Bed Mobility 1  Bed Mobility 1: Supine to sitting  Level of Assistance 1: Moderate assistance  Bed Mobility Comments 1: HOB elevated    Transfers  Transfer: Yes  Transfer 1  Technique 1: Sit to stand, Stand to sit  Transfer Device 1:  (B amr in arm assist vs tee steady)  Transfer Level of Assistance 1: Arm in arm assistance, Moderate assistance, +2  Trials/Comments 1: x2 trials from elevated EOB, blocking of knees and inititation of task required.  Transfers 2  Transfer From 2: Bed to  Transfer to 2: Chair with arms  Transfer Device 2: Tee Stedy  Transfer Level of Assistance 2: Dependent  Trials/Comments 2: sit to stand onto/off tee steady with min assist x 2.  Extremity/Trunk Assessments:  RLE   RLE : Exceptions to WFL  Strength RLE  RLE Overall Strength: Greater than or equal to 3/5 as evidenced by functional mobility  LLE   LLE : Exceptions to WFL  Strength LLE  LLE Overall Strength: Greater than or equal to 3/5 as evidenced by functional mobility  Outcome Measures:  [facility] Basic Mobility  Turning from your back to your side while in a flat bed without using bedrails: A lot  Moving from lying on your back to sitting on the side of a flat bed without using bedrails: A lot  Moving to and from bed to chair (including a wheelchair): Total  Standing up from a chair using your arms (e.g. wheelchair or bedside chair): A lot  To walk in hospital room: Total  Climbing 3-5 steps with railing: Total  Basic Mobility - Total Score: 9    Treatment  Pt sat on EOB about 10 minutes with supervision to CGA. Repeated cueing to promote  upright posture. Encouraged pursed-lip breathing.  Performed LAQs and ankle pumps. During standing trial, repeated cueing to promote LE and trunk extension into erect stance.       Encounter Problems       Encounter Problems (Active)       Balance       sitting (Progressing)       Start:  10/19/24    Expected End:  11/02/24       Pt will sit on EOB with supervision and no LOB, during activities         standing (Progressing)       Start:  10/19/24    Expected End:  11/02/24       Pt will stand with UE support of walker and CGA            Mobility       bed mobility (Progressing)       Start:  10/19/24    Expected End:  11/02/24       Pt will perform sup to/from sit transfer with supervision             PT Transfers       sit to stand (Progressing)       Start:  10/19/24    Expected End:  11/02/24       Pt will perform sit to stand transfer with walker and CGA         bed to chair (Progressing)       Start:  10/19/24    Expected End:  11/02/24       Pt will perform bed to chair transfer with walker and CGA            Safety       LTG - Patient will utilize safety techniques (Progressing)       Start:  10/19/24    Expected End:  11/02/24                   Education Documentation  Precautions, taught by Christina Dickson PT at 10/19/2024  1:37 PM.  Learner: Patient  Readiness: Acceptance  Method: Explanation  Response: Needs Reinforcement    Body Mechanics, taught by Christina Dickson PT at 10/19/2024  1:37 PM.  Learner: Patient  Readiness: Acceptance  Method: Explanation  Response: Needs Reinforcement    Mobility Training, taught by Christina Dickson PT at 10/19/2024  1:37 PM.  Learner: Patient  Readiness: Acceptance  Method: Explanation  Response: Needs Reinforcement    Education Comments  No comments found.

## 2024-10-19 NOTE — PROGRESS NOTES
Mariella Cuello is a 79 y.o. female on day 1 of admission presenting with Sepsis with encephalopathy and septic shock, due to unspecified organism (Multi).    Subjective   Interval History:   Afebrile  Off pressors  Awake, alert  Sitting in the chair  Tolerating regular diet        Review of Systems   All other systems reviewed and are negative.      Objective   Range of Vitals (last 24 hours)  Heart Rate:  []   Temp:  [35.2 °C (95.4 °F)-37 °C (98.6 °F)]   Resp:  [17-25]   BP: (110-140)/(64-89)   Weight:  [82 kg (180 lb 12.4 oz)-82.8 kg (182 lb 8.7 oz)]   SpO2:  [90 %-96 %]   Daily Weight  10/19/24 : 82.8 kg (182 lb 8.7 oz)    Body mass index is 31.33 kg/m².    Physical Exam  Constitutional:       Comments: Awake, alert  HENT:      Head: Normocephalic and atraumatic.      Nose: Nose normal.      Mouth/Throat:      Mouth: Mucous membranes are moist.      Pharynx: Oropharynx is clear.   Eyes:      General: No scleral icterus.  Neck:      Comments: Right internal jugular catheter   Cardiovascular:      Rate and Rhythm: Normal rate and regular rhythm.   Pulmonary:      Effort: Pulmonary effort is normal.      Breath sounds: Normal breath sounds.   Abdominal:      General: Bowel sounds are normal.      Palpations: Abdomen is soft.   Musculoskeletal:         General: Normal range of motion.      Cervical back: Normal range of motion and neck supple.   Skin:     General: Skin is warm and dry.   Neurological:      Comments: Awake, alert   Psychiatric:         Mood and Affect: Mood normal.         Behavior: Behavior normal.        Antibiotics  piperacillin-tazobactam - 3.375 gram/50 mL  vancomycin - 1.25 gram/250 mL    Relevant Results  Labs  Results from last 72 hours   Lab Units 10/19/24  0414 10/18/24  0403 10/17/24  1927   WBC AUTO x10*3/uL 13.2* 15.5* 10.2   HEMOGLOBIN g/dL 14.2 15.5 17.7*   HEMATOCRIT % 42.1 46.0 52.5*   PLATELETS AUTO x10*3/uL 223 228 306   NEUTROS PCT AUTO % 60.0  --  68.6   LYMPHS PCT AUTO %  "31.1  --  27.3   MONOS PCT AUTO % 7.4  --  3.2   EOS PCT AUTO % 0.6  --  0.2     Results from last 72 hours   Lab Units 10/19/24  0414 10/18/24  0403 10/17/24  1927   SODIUM mmol/L 138 134* 138   POTASSIUM mmol/L 3.6 3.7 4.2   CHLORIDE mmol/L 108* 106 104   CO2 mmol/L 23 19* 21   BUN mg/dL 19 21 16   CREATININE mg/dL 1.06* 1.16* 1.45*   GLUCOSE mg/dL 96 109* 135*   CALCIUM mg/dL 9.2 9.4 10.5*   ANION GAP mmol/L 11 13 17   EGFR mL/min/1.73m*2 53* 48* 37*   PHOSPHORUS mg/dL 2.6  --   --      Results from last 72 hours   Lab Units 10/19/24  0414 10/18/24  0403 10/17/24  1927   ALK PHOS U/L  --  94 116   BILIRUBIN TOTAL mg/dL  --  0.5 0.6   PROTEIN TOTAL g/dL  --  6.6 7.9   ALT U/L  --  18 23   AST U/L  --  31 29   ALBUMIN g/dL 3.3* 3.5 4.3     Estimated Creatinine Clearance: 44 mL/min (A) (by C-G formula based on SCr of 1.06 mg/dL (H)).  No results found for: \"CRP\"  Microbiology  Reviewed-positive nasal MRSA PCR  Imaging  Transthoracic Echo (TTE) Complete    Result Date: 10/19/2024            [facility] [id number] , [place] [id number]             Phone [phone] TRANSTHORACIC ECHOCARDIOGRAM REPORT Patient Name:      JOSEPHINE Medina Physician:    [id number] Nirmala Hammond MD Study Date:        10/18/2024            Ordering Provider:    [id number] BENEDICT GOMES MRN/PID:           [id number]              Fellow: Accession#:        [id number]          Nurse: Date of Birth/Age: 1945 / 79 years  Sonographer:          Nicolasa Hogan RDCS Gender:            F                     Additional Staff: Height:            162.56 cm             Admit Date: Weight:            83.01 kg              Admission Status:     Inpatient -                                                                Routine BSA / " BMI:         1.88 m2 / 31.41 kg/m2 Department Location:  [facility] Blood Pressure: 109 /97 mmHg Study Type:    TRANSTHORACIC ECHO (TTE) COMPLETE Diagnosis/ICD: Dilated cardiomyopathy-I42.0 Indication:    dilated cardiomyopathy CPT Codes:     Echo Complete w Full Doppler-[id number] Patient History: Pertinent History: CHF, HTN, cardiomyopathy, arteriosclerosis of CABG, altered                    mental status. Study Detail: The following Echo studies were performed: 2D, M-Mode, Doppler and               color flow. Technically challenging study due to patient lying in               supine position and body habitus.  PHYSICIAN INTERPRETATION: Left Ventricle: The left ventricular systolic function is moderately decreased, with a visually estimated ejection fraction of 40%. Wall motion is abnormal. The left ventricular cavity size is normal. There is moderate concentric left ventricular hypertrophy. Spectral Doppler shows a normal pattern of left ventricular diastolic filling. Anteroapical wall severe Hypokinesis. Left Atrium: The left atrium is normal in size. Right Ventricle: The right ventricle is normal in size. There is normal right ventricular global systolic function. Right Atrium: The right atrium is normal in size. Aortic Valve: The aortic valve is trileaflet. The aortic valve dimensionless index is 0.67. There is no evidence of aortic valve regurgitation. The peak instantaneous gradient of the aortic valve is 3.2 mmHg. The mean gradient of the aortic valve is 2.0 mmHg. Mitral Valve: The mitral valve is normal in structure. There is no evidence of mitral valve regurgitation. Tricuspid Valve: The tricuspid valve is structurally normal. There is trace tricuspid regurgitation. The right ventricular systolic pressure is unable to be estimated. Pulmonic Valve: The pulmonic valve is structurally normal. There is physiologic pulmonic valve regurgitation. Pericardium: No pericardial effusion noted. Aorta: The aortic root  is normal. Systemic Veins: The inferior vena cava was not well visualized.  CONCLUSIONS:  1. Poorly visualized anatomical structures due to suboptimal image quality.  2. The left ventricular systolic function is moderately decreased, with a visually estimated ejection fraction of 40%.  3. Abnormal wall motion.  4. Anteroapical wall severe Hypokinesis.  5. There is moderate concentric left ventricular hypertrophy.  6. There is normal right ventricular global systolic function.  7. No evidence of mitral valve regurgitation.  8. Trace tricuspid regurgitation is visualized. QUANTITATIVE DATA SUMMARY:  2D MEASUREMENTS:            Normal Ranges: IVSd:            1.68 cm    (0.6-1.1cm) LVPWd:           1.49 cm    (0.6-1.1cm) LVIDd:           3.33 cm    (3.9-5.9cm) LVIDs:           2.63 cm LV Mass Index:   105.3 g/m2 LV % FS          21.0 %  LA VOLUME:                    Normal Ranges: LA Vol A4C:        33.6 ml    (22+/-6mL/m2) LA Vol A2C:        28.4 ml LA Vol BP:         31.4 ml LA Vol Index A4C:  17.8ml/m2 LA Vol Index A2C:  15.1 ml/m2 LA Vol Index BP:   16.7 ml/m2 LA Area A4C:       13.8 cm2 LA Area A2C:       12.5 cm2 LA Major Axis A4C: 4.8 cm LA Major Axis A2C: 4.7 cm LA Volume Index:   16.9 ml/m2 LA Vol A4C:        34.7 ml LA Vol A2C:        28.5 ml LA Vol Index BSA:  16.8 ml/m2  M-MODE MEASUREMENTS:         Normal Ranges: Ao Root:             2.70 cm (2.0-3.7cm) LAs:                 4.30 cm (2.7-4.0cm)  AORTA MEASUREMENTS:         Normal Ranges: Ao Sinus, d:        2.98 cm (2.1-3.5cm) Ao STJ, d:          2.74 cm (1.7-3.4cm) Asc Ao, d:          3.40 cm (2.1-3.4cm)  LV SYSTOLIC FUNCTION BY 2D PLANIMETRY (MOD):                      Normal Ranges: EF-A4C View:    40 % (>=55%) EF-A2C View:    43 % EF-Biplane:     42 % EF-Visual:      40 % LV EF Reported: 40 %  LV DIASTOLIC FUNCTION:           Normal Ranges: MV Peak E:             0.90 m/s  (0.7-1.2 m/s) MV e'                  0.047 m/s (>8.0) MV lateral e'          0.04  m/s MV medial e'           0.05 m/s E/e' Ratio:            19.12     (<8.0)  MITRAL VALVE:          Normal Ranges: MV DT:        103 msec (150-240msec)  AORTIC VALVE:                     Normal Ranges: AoV Vmax:                0.90 m/s (<=1.7m/s) AoV Peak PG:             3.2 mmHg (<20mmHg) AoV Mean P.0 mmHg (1.7-11.5mmHg) LVOT Max Ravinder:            0.75 m/s (<=1.1m/s) AoV VTI:                 15.70 cm (18-25cm) LVOT VTI:                10.50 cm LVOT Diameter:           2.40 cm  (1.8-2.4cm) AoV Area, VTI:           3.03 cm2 (2.5-5.5cm2) AoV Area,Vmax:           3.77 cm2 (2.5-4.5cm2) AoV Dimensionless Index: 0.67  RIGHT VENTRICLE: RV s' 0.12 m/s  [id number] Nirmala Hammond MD Electronically signed on 10/19/2024 at 10:11:19 AM  ** Final **     Electrocardiogram, 12-lead ACS symptoms    Result Date: 10/18/2024  Normal sinus rhythm Nonspecific intraventricular block Possible Lateral infarct , age undetermined Abnormal ECG When compared with ECG of 18-DEC-2023 21:29, QRS axis Shifted left Inverted T waves have replaced nonspecific T wave abnormality in Anterior leads QT has lengthened    CT abdomen pelvis w IV contrast    Result Date: 10/17/2024  Interpreted By:  Finkelstein, Evan, STUDY: CT ABDOMEN PELVIS W IV CONTRAST;  10/17/2024 10:52 pm   INDICATION: Signs/Symptoms:concern for sepsis.     COMPARISON: None.   ACCESSION NUMBER(S): [id number]   ORDERING CLINICIAN: TEREZA REYES   TECHNIQUE: Axial CT images of the abdomen and pelvis with coronal and sagittal reconstructed images obtained after intravenous administration of 75 mL Omnipaque 350   FINDINGS: LOWER CHEST: Emphysematous changes in the visualized lung bases with bibasilar opacities.   ABDOMEN:   LIVER: Normal attenuation and contour. BILE DUCTS: Normal caliber. GALLBLADDER: The gallbladder is distended without wall thickening or pericholecystic fluid. No calcified gallstones are evident. PORTAL VEIN: Patent SPLEEN: Unremarkable. PANCREAS:  Unremarkable. ADRENALS: Unremarkable. KIDNEYS, URETERS and URINARY BLADDER: Symmetric renal enhancement. No hydronephrosis or perinephric fluid collection. Subcentimeter hypodensities in the kidneys are too small to characterize. A Carr catheter partially decompresses the bladder. There is associated wall thickening with mild adjacent stranding. Lucencies in the bladder likely related to instrumentation. REPRODUCTIVE ORGANS: Status post hysterectomy. Small amount of free pelvic fluid.   ABDOMINAL WALL: Within normal limits. PERITONEUM: No ascites, free air or fluid collection.   BOWEL: Gastric wall thickening. No small or large bowel dilatation. Scattered colonic diverticula. No pericolonic inflammatory stranding. Focal wall thickening of the cecum best seen on coronal image 39 of series 5.   VESSELS: No aortic aneurysm. Moderate aortoiliac calcifications. RETROPERITONEUM: No pathologically enlarged retroperitoneal lymph nodes.   BONES: No acute osseous abnormality.       A Carr catheter partially decompresses the urinary bladder, which limits evaluation. There is, however, associated wall thickening with mild adjacent stranding. Recommend correlation with urinalysis as findings may be seen with cystitis.   There is gallbladder distention, however, no calcified gallstones or wall thickening are evident.   Gastric wall thickening. Correlate for symptoms of gastritis.   Partially imaged bibasilar opacities favored to represent atelectasis. Developing infection is not excluded in the appropriate clinical setting.   Scattered colonic diverticulosis. No CT evidence of acute diverticulitis.   Focal wall thickening of the cecum best seen on coronal image 39 of series 5, with appearance suspicious for a colonic mass. Recommend nonemergent colonoscopy to further evaluate.   MACRO: None.   Signed by: Evan Finkelstein 10/17/2024 11:26 PM Dictation workstation:   [id number]    CT head wo IV contrast    Result Date:  10/17/2024  Interpreted By:  Finkelstein, Evan, STUDY: CT HEAD WO IV CONTRAST;  10/17/2024 10:59 pm   INDICATION: Signs/Symptoms:AMS.     COMPARISON: CT brain 12/18/2023   ACCESSION NUMBER(S): [id number]   ORDERING CLINICIAN: TEREZA REYES   TECHNIQUE: Axial noncontrast CT images of the head with coronal and sagittal reconstructions.   FINDINGS: EXTRACRANIAL SOFT TISSUES: Unremarkable.   CALVARIUM: No depressed skull fracture. No destructive osseous lesion.   PARANASAL SINUSES/MASTOIDS: The visualized paranasal sinuses and mastoid air cells are aerated.   HEMORRHAGE: No acute intracranial hemorrhage.   BRAIN PARENCHYMA: Gray-white matter interfaces are preserved. No mass effect or midline shift. Aneurysmal coils at the skull base are again seen with adjacent streak artifact limiting evaluation. There are nonspecific scattered white matter hypodensities.   VENTRICLES and EXTRA-AXIAL SPACES: Parenchymal atrophy with prominence of the ventricles and cortical sulci.   OTHER FINDINGS: None.       No acute intracranial hemorrhage, mass effect or midline shift.   Nonspecific scattered white matter hypodensities favored to represent sequela of small vessel ischemia.       MACRO: None.   Signed by: Evan Finkelstein 10/17/2024 11:16 PM Dictation workstation:   [id number]    XR chest 1 view    Result Date: 10/17/2024  Interpreted By:  Ivana Levy, STUDY: XR CHEST 1 VIEW;  10/17/2024 8:50 pm   INDICATION: Signs/Symptoms:central line placement, right IJ.   COMPARISON: 10/17/2024 at 7:35 p.m.   ACCESSION NUMBER(S): [id number]   ORDERING CLINICIAN: MELBA PARK   FINDINGS: Right IJ central venous catheter tip is at the level of the mid SVC.   CARDIOMEDIASTINAL SILHOUETTE: Cardiomediastinal silhouette is normal in size and configuration.   LUNGS: No pulmonary consolidation, pleural effusion or pneumothorax. Stable chronic parenchymal scarring/fibrotic changes.   ABDOMEN: No remarkable upper abdominal findings.    BONES: No acute osseous abnormality.       Right IJ central venous catheter tip is at the level of the mid SVC.   No acute cardiopulmonary process.   MACRO: None   Signed by: Ivana Levy 10/17/2024 9:23 PM Dictation workstation:   [id number]    XR chest 1 view    Result Date: 10/17/2024  Interpreted By:  Dev Burch, STUDY: XR CHEST 1 VIEW;  10/17/2024 7:46 pm   INDICATION: Signs/Symptoms:AMS.     COMPARISON: 12/18/2023   ACCESSION NUMBER(S): [id number]   ORDERING CLINICIAN: TEREZA REYES   FINDINGS: PA and lateral radiographs of the chest were provided.       CARDIOMEDIASTINAL SILHOUETTE: Cardiomediastinal silhouette is normal in size and configuration.   LUNGS: Bibasilar atelectasis versus infiltrate.   ABDOMEN: No remarkable upper abdominal findings.   BONES: No acute osseous changes. There are sternotomy wires noted.       1.  Bibasilar atelectasis versus infiltrate.       MACRO: None   Signed by: Dev Burch 10/17/2024 7:55 PM Dictation workstation:   [id number]     Assessment/Plan   Septic shock, resolved  Toxic metabolic encephalopathy, resolved  Resolving acute kidney injury  Abnormal abdominal CT-gastroenterology on consult  Pyuria-negative urine culture  Abnormal chest imaging-atelectasis versus infiltrate  MRSA colonization of nares    Discontinue vancomycin  Decolonize per protocol  Continue Zosyn  Supportive care  Monitor temperature and WBC  Monitor renal function      Bruce Amaya MD

## 2024-10-19 NOTE — PROGRESS NOTES
Subjective Data:  Patient is doing okay.  Denies having chest pain.  Telemetry overnight reviewed no events.  Remains in sinus tachycardia with left bundle branch.    Overnight Events:    No events overnight.     Objective Data:  Last Recorded Vitals:  Vitals:    10/19/24 0246 10/19/24 0500 10/19/24 0600 10/19/24 0735   BP:    114/85   BP Location:    Left arm   Patient Position:    Lying   Pulse:    106   Resp:    19   Temp: 35.2 °C (95.4 °F) 37 °C (98.6 °F)  36.2 °C (97.2 °F)   TempSrc: Temporal Temporal  Temporal   SpO2:    90%   Weight:  82 kg (180 lb 12.4 oz) 82.8 kg (182 lb 8.7 oz)    Height:           Last Labs:  CBC - 10/19/2024:  4:14 AM  13.2 14.2 223    42.1      CMP - 10/19/2024:  4:14 AM  9.2 6.6 31 --- 0.5   2.6 3.3 18 94      PTT - 10/18/2024:  5:07 PM  1.1   11.4 60.2     TROPHS   Date/Time Value Ref Range Status   10/18/2024 04:03  0 - 13 ng/L Final     Comment:     Previous result verified on 10/17/2024 2012 on specimen/case [id number] called with component TRPHS for procedure Troponin I, High Sensitivity with value 999 ng/L.   10/18/2024 01:01  0 - 13 ng/L Final     Comment:     Previous result verified on 10/17/2024 2012 on specimen/case [id number] called with component TRPHS for procedure Troponin I, High Sensitivity with value 999 ng/L.   10/17/2024 09:17  0 - 13 ng/L Final     Comment:     Previous result verified on 10/17/2024 2012 on specimen/case [id number] called with component TRPHS for procedure Troponin I, High Sensitivity with value 999 ng/L.     BNP   Date/Time Value Ref Range Status   10/17/2024 07:41 PM 1,995 0 - 99 pg/mL Final     HGBA1C   Date/Time Value Ref Range Status   03/01/2022 09:36 AM 4.9 4.3 - 5.6 % Final     Comment:     American Diabetes Association guidelines indicate that patients with HgbA1c in the range 5.7-6.4% are at increased risk for development of diabetes, and intervention by lifestyle modification may be beneficial. HgbA1c  greater or equal to 6.5% is considered diagnostic of diabetes.   02/19/2021 06:17 AM 4.6 4.3 - 5.6 % Final     Comment:     American Diabetes Association guidelines indicate that patients with HgbA1c in   the range 5.7-6.4% are at increased risk for development of diabetes, and   intervention by lifestyle modification may be beneficial. HgbA1c greater or   equal to 6.5% is considered diagnostic of diabetes.     LDLCALC   Date/Time Value Ref Range Status   10/02/2023 08:25 AM 66 140 - 190 mg/dL Final     Comment:                                 Near   Borderline      AGE      Desirable  Optimal    High     High     Very High     0-19 Y     0 - 109     ---    110-129   >/= 130     ----    20-24 Y     0 - 119     ---    120-159   >/= 160     ----      >24 Y     0 -  99   100-129  130-159   160-189     >/=190     04/03/2023 07:26 AM 50 65 - 130 MG/DL Final   10/03/2022 07:00 AM 20 65 - 130 MG/DL Final   06/04/2020 10:24 AM 53 65 - 130 MG/DL Final     VLDL   Date/Time Value Ref Range Status   10/02/2023 08:25 AM 19 0 - 40 mg/dL Final   10/11/2021 10:57 AM 20 0 - 40 mg/dL Final      Last I/O:  I/O last 3 completed shifts:  In: 1066 (12.9 mL/kg) [I.V.:182.7 (2.2 mL/kg); IV Piggyback:883.3]  Out: 750 (9.1 mL/kg) [Urine:750 (0.3 mL/kg/hr)]  Weight: 82.8 kg     Past Cardiology Tests (Last 3 Years):  EKG:  Electrocardiogram, 12-lead ACS symptoms 10/17/2024 (Preliminary)      ECG 12 Lead       ECG 12 lead 12/18/2023    Echo:  Transthoracic Echo (TTE) Complete 10/18/2024    Ejection Fractions:  EF   Date/Time Value Ref Range Status   10/18/2024 08:38 AM 40 %      Cath:  No results found for this or any previous visit from the past 1095 days.    Stress Test:  No results found for this or any previous visit from the past 1095 days.    Cardiac Imaging:  No results found for this or any previous visit from the past 1095 days.      Inpatient Medications:  Scheduled medications   Medication Dose Route Frequency    aspirin  81 mg oral  Daily    atorvastatin  20 mg oral Nightly    pantoprazole  40 mg oral Daily before breakfast    Or    esomeprazole  40 mg nasoduodenal tube Daily before breakfast    Or    pantoprazole  40 mg intravenous Daily before breakfast    insulin lispro  0-5 Units subcutaneous q4h    lamoTRIgine  100 mg oral BID    levothyroxine  88 mcg oral Daily    perflutren protein A microsphere  0.5 mL intravenous Once in imaging    piperacillin-tazobactam  3.375 g intravenous q6h    sulfur hexafluoride microsphr  2 mL intravenous Once in imaging    topiramate  50 mg oral BID    vancomycin  1,250 mg intravenous q24h     PRN medications   Medication    dextrose    dextrose    glucagon    heparin (porcine)    ondansetron    vancomycin     Continuous Medications   Medication Dose Last Rate    heparin  0-4,000 Units/hr 500 Units/hr (10/18/24 1301)    norepinephrine  0-0.2 mcg/kg/min Stopped (10/18/24 0215)       Physical Exam:  General: Patient is in no acute distress.  Obtunded  HEENT: atraumatic normocephalic.  Neck: is supple jugular venous pressure within normal limits no thyromegaly.  Cardiovascular regular rate and rhythm normal heart sounds no murmurs rubs or gallops.  Lungs: clear to auscultation bilaterally.  Abdomen: is soft nontender.  Extremities warm to touch no edema.       Assessment/Plan  1 shock.  Patient presented to the hospital with what appears to be septic shock.  Doubt she is in cardiogenic shock.  Currently she is off pressors.  2D echo showed ejection fraction of 40%.  Patient has new left bundle branch.  Elevated troponin.  For now we will treat her for septic shock.  I will start metoprolol and vasodilators renal function improving.  Will follow-up in AM.      2.  Chronic systolic heart failure ejection fraction of 30 to 35%.  Patient had no ICD placed.  Had coronary disease status post CABG in 2020.  At this point we will optimize her treatment for heart failure systolic dysfunction.     3.  Hyperlipidemia  recommend high intensity statin.     4.  Coronary artery disease status post CABG recommend baby aspirin and addition of high intensity statin.     5.  Acute on chronic renal failure management by primary team.     Thank you for the consult  CVC 10/17/24 Triple lumen Right Internal jugular (Active)   Site Assessment Clean;Dry;Intact 10/19/24 0756   Dressing Status Clean;Dry;Occlusive 10/19/24 0756   Number of days: 2       Peripheral IV 10/17/24 20 G Right Antecubital (Active)   Site Assessment Clean;Dry;Intact 10/19/24 0756   Dressing Status Old drainage 10/19/24 0756   Number of days: 2       Peripheral IV 10/17/24 22 G Left;Anterior Hand (Active)   Site Assessment Clean;Dry;Intact 10/19/24 0756   Dressing Status Clean;Dry;Occlusive 10/19/24 0756   Number of days: 2       Urethral Catheter Non-latex;Straight-tip 16 Fr. (Active)   Output (mL) 250 mL 10/19/24 0430   Number of days: 2       Code Status:  DNR and No Intubation      Nirmala Hammond MD

## 2024-10-19 NOTE — CONSULTS
Reason For Consult  Abnormal CT    History Of Present Illness  Mariella Cuello is a 79 y.o. female presenting with change in mental status and weakness.    She currently denies any GI complaints.  She denies any abdominal pain, nausea/vomiting, hematemesis, or overt bleeding.  He is unable to tell me when her last bowel movement was.  Very poor historian    Labs show CBC with WBC of 13.2, hemoglobin 14.2.  Mild CKD with creatinine of 1.06 GFR 53  CT of abdomen/pelvis shows gastric wall thickening, correlate with gastritis.  Focal wall thickening of the cecum with suspicion for colonic mass    Last colonoscopy in 2011 with Dr. Salamanca with no colon polyps.  EGD in 2021 showing small prepyloric ulceration, mild gastritis, Jiménez dilation meeting mild resistance     Past Medical History  She has a past medical history of Alzheimer disease (Multi), Anemia, Atherosclerotic heart disease of native coronary artery without angina pectoris (05/12/2021), Bipolar disorder (Multi), Cardiomyopathy (Multi), Chronic systolic (congestive) heart failure (Multi) (05/12/2021), Depression, Dysphagia, Gastritis, History of COVID-19, Hyperlipidemia, Hypertension, Hypothyroidism, Major depressive disorder, recurrent, unspecified (CMS-HCC) (05/12/2021), Obstipation, Respiratory failure (Multi), Schizoaffective disorder (Multi), Spinal stenosis, Stroke (Multi), and Systolic heart failure (Multi).    Surgical History  She has a past surgical history that includes Tonsillectomy (02/29/2016); Cataract extraction (02/29/2016); Total abdominal hysterectomy (02/29/2016); MR angio head wo IV contrast (01/22/2014); and Coronary artery bypass graft.     Social History  She reports that she has never smoked. She has never been exposed to tobacco smoke. She does not have any smokeless tobacco history on file. No history on file for alcohol use and drug use.    Family History  No family history on file.     Allergies  Patient has no known  "allergies.    Review of Systems  See HPI     Physical Exam  Neuro: AOX1-2, appropriate affect, symmetric facial movements  Cardiac: S1/S2 equal, RRR, no clicks/gallops/murmurs  Resp: Lung sounds clear, no adventigous breath sounds auscultated  GI: abdomen is soft, nontender, no guarding or rebound tenderness  : no flank pain, no hematuria, or dysuria  M/S: normal strength, active range of motion  Skin: normal skin tone and color, no ertyhema       Last Recorded Vitals  Blood pressure 100/83, pulse 106, temperature 36.6 °C (97.9 °F), temperature source Temporal, resp. rate 13, height 1.626 m (5' 4\"), weight 82.8 kg (182 lb 8.7 oz), SpO2 93%.    Relevant Results  Results for orders placed or performed during the hospital encounter of 10/17/24 (from the past 24 hours)   POCT GLUCOSE   Result Value Ref Range    POCT Glucose 112 (H) 74 - 99 mg/dL   aPTT   Result Value Ref Range    aPTT 60.2 (H) 22.0 - 32.5 seconds   POCT GLUCOSE   Result Value Ref Range    POCT Glucose 97 74 - 99 mg/dL   POCT GLUCOSE   Result Value Ref Range    POCT Glucose 112 (H) 74 - 99 mg/dL   POCT GLUCOSE   Result Value Ref Range    POCT Glucose 97 74 - 99 mg/dL   Vancomycin   Result Value Ref Range    Vancomycin 14.4 5.0 - 20.0 ug/mL   CBC and Auto Differential   Result Value Ref Range    WBC 13.2 (H) 4.4 - 11.3 x10*3/uL    nRBC 0.0 0.0 - 0.0 /100 WBCs    RBC 4.40 4.00 - 5.20 x10*6/uL    Hemoglobin 14.2 12.0 - 16.0 g/dL    Hematocrit 42.1 36.0 - 46.0 %    MCV 96 80 - 100 fL    MCH 32.3 26.0 - 34.0 pg    MCHC 33.7 32.0 - 36.0 g/dL    RDW 13.2 11.5 - 14.5 %    Platelets 223 150 - 450 x10*3/uL    Neutrophils % 60.0 40.0 - 80.0 %    Immature Granulocytes %, Automated 0.3 0.0 - 0.9 %    Lymphocytes % 31.1 13.0 - 44.0 %    Monocytes % 7.4 2.0 - 10.0 %    Eosinophils % 0.6 0.0 - 6.0 %    Basophils % 0.6 0.0 - 2.0 %    Neutrophils Absolute 7.92 (H) 1.60 - 5.50 x10*3/uL    Immature Granulocytes Absolute, Automated 0.04 0.00 - 0.50 x10*3/uL    Lymphocytes " Absolute 4.10 (H) 0.80 - 3.00 x10*3/uL    Monocytes Absolute 0.98 (H) 0.05 - 0.80 x10*3/uL    Eosinophils Absolute 0.08 0.00 - 0.40 x10*3/uL    Basophils Absolute 0.08 0.00 - 0.10 x10*3/uL   Renal Function Panel   Result Value Ref Range    Glucose 96 74 - 99 mg/dL    Sodium 138 136 - 145 mmol/L    Potassium 3.6 3.5 - 5.3 mmol/L    Chloride 108 (H) 98 - 107 mmol/L    Bicarbonate 23 21 - 32 mmol/L    Anion Gap 11 10 - 20 mmol/L    Urea Nitrogen 19 6 - 23 mg/dL    Creatinine 1.06 (H) 0.50 - 1.05 mg/dL    eGFR 53 (L) >60 mL/min/1.73m*2    Calcium 9.2 8.6 - 10.3 mg/dL    Phosphorus 2.6 2.5 - 4.9 mg/dL    Albumin 3.3 (L) 3.4 - 5.0 g/dL   Magnesium   Result Value Ref Range    Magnesium 1.98 1.60 - 2.40 mg/dL   Troponin I, High Sensitivity   Result Value Ref Range    Troponin I, High Sensitivity 277 (HH) 0 - 13 ng/L   POCT GLUCOSE   Result Value Ref Range    POCT Glucose 100 (H) 74 - 99 mg/dL   POCT GLUCOSE   Result Value Ref Range    POCT Glucose 103 (H) 74 - 99 mg/dL         Assessment/Plan     Abnormal CT  -Concern for mass at cecum. With her hx of dementia, DNR status would be of limited benefit for colonscopy. I did reach out to son jane, he wants to discuss with his family first. He did say that they wouldn't likely want surgery or radiation. Will reach back out to him tomm.       Norman Alvarez, APRN-CNP

## 2024-10-19 NOTE — PROGRESS NOTES
Vancomycin Dosing by Pharmacy- FOLLOW UP    Mariella Cuello is a 79 y.o. year old female who Pharmacy has been consulted for vancomycin dosing for other UTI . Based on the patient's indication and renal status this patient is being dosed based on a goal AUC of 400-600.     Renal function is currently improving.    Current vancomycin dose: 1000 mg given every 24 hours    Estimated vancomycin AUC on current dose: 401 mg/L.hr     Visit Vitals  /85 (BP Location: Left arm, Patient Position: Lying)   Pulse 106   Temp 36.2 °C (97.2 °F) (Temporal)   Resp 19        Lab Results   Component Value Date    CREATININE 1.06 (H) 10/19/2024    CREATININE 1.16 (H) 10/18/2024    CREATININE 1.45 (H) 10/17/2024    CREATININE 0.99 10/02/2024        Patient weight is as follows:   Vitals:    10/19/24 0600   Weight: 82.8 kg (182 lb 8.7 oz)       Cultures:  No results found for the encounter in last 14 days.       I/O last 3 completed shifts:  In: 1066 (12.9 mL/kg) [I.V.:182.7 (2.2 mL/kg); IV Piggyback:883.3]  Out: 750 (9.1 mL/kg) [Urine:750 (0.3 mL/kg/hr)]  Weight: 82.8 kg   I/O during current shift:  No intake/output data recorded.    Temp (24hrs), Av.3 °C (97.4 °F), Min:35.2 °C (95.4 °F), Max:37 °C (98.6 °F)      Assessment/Plan    Below goal AUC. Orders placed for new vancomcyin regimen of 1250 mg every 24 hours to begin at 2000 10/19.     This dosing regimen is predicted by [employer] to result in the following pharmacokinetic parameters:  Loading dose: N/A  Regimen: 1000 mg IV every 24 hours.  Start time: 20:16 on 10/19/2024  Exposure target: AUC24 (range)400-600 mg/L.hr   PLC54-07: 389 mg/L.hr  AUC24,ss: 401 mg/L.hr  Probability of AUC24 > 400: 51 %  Ctrough,ss: 10.8 mg/L  Probability of Ctrough,ss > 20: 2 %      The next level will be obtained on 10/20 at 0500. May be obtained sooner if clinically indicated.   Will continue to monitor renal function daily while on vancomycin and order serum creatinine at least every 48 hours  if not already ordered.  Follow for continued vancomycin needs, clinical response, and signs/symptoms of toxicity.       Dhara Palacios, PharmD

## 2024-10-19 NOTE — PROGRESS NOTES
"Mariella Cuello is a 79 y.o. female on day 1 of admission presenting with Sepsis with encephalopathy and septic shock, due to UTI.     Subjective   She had been on norepinephrine drip which was discontinued on 10/18.   She was more awake today than she was yesterday.  About a month that she is nonoperative.  She was oriented to place.  She stated the year correctly, says she did not know what month it was.  She was seen by speech therapy today and passed a swallow evaluation.  She is on regular diet with soft bite-size food and thin liquids.  She reports she has no pain.  She had elevated troponin levels of admission and was started on a heparin drip.     Objective     Physical Exam  General: awake, alert, oriented, responsive  Neck: There was a right internal jugular triple-lumen catheter in place with no active bleeding, swelling, warmth or tenderness.  Cardiovascular: regular, normal S1 and S2  Lungs: good air entry bilaterally, clear to auscultation  Abdomen: soft, nontender, bowel sounds present, normoactive  Extremities: no peripheral cyanosis, no pedal edema  Neuro: awake, oriented to self and place, stated the year correctly but not the month.       Last Recorded Vitals  Blood pressure 114/85, pulse 106, temperature 36.2 °C (97.2 °F), temperature source Temporal, resp. rate 19, height 1.626 m (5' 4\"), weight 82.8 kg (182 lb 8.7 oz), SpO2 90%.  Intake/Output last 3 Shifts:  I/O last 3 completed shifts:  In: 1066 (12.9 mL/kg) [I.V.:182.7 (2.2 mL/kg); IV Piggyback:883.3]  Out: 750 (9.1 mL/kg) [Urine:750 (0.3 mL/kg/hr)]  Weight: 82.8 kg     Relevant Results  Lab Results   Component Value Date    WBC 13.2 (H) 10/19/2024    HGB 14.2 10/19/2024    HCT 42.1 10/19/2024    MCV 96 10/19/2024     10/19/2024     Lab Results   Component Value Date    GLUCOSE 96 10/19/2024    CALCIUM 9.2 10/19/2024     10/19/2024    K 3.6 10/19/2024    CO2 23 10/19/2024     (H) 10/19/2024    BUN 19 10/19/2024    " CREATININE 1.06 (H) 10/19/2024     Scheduled medications  aspirin, 81 mg, oral, Daily  atorvastatin, 20 mg, oral, Nightly  pantoprazole, 40 mg, oral, Daily before breakfast   Or  esomeprazole, 40 mg, nasoduodenal tube, Daily before breakfast   Or  pantoprazole, 40 mg, intravenous, Daily before breakfast  insulin lispro, 0-5 Units, subcutaneous, q4h  lamoTRIgine, 100 mg, oral, BID  levothyroxine, 88 mcg, oral, Daily  perflutren protein A microsphere, 0.5 mL, intravenous, Once in imaging  piperacillin-tazobactam, 3.375 g, intravenous, q6h  sulfur hexafluoride microsphr, 2 mL, intravenous, Once in imaging  topiramate, 50 mg, oral, BID  vancomycin, 1,250 mg, intravenous, q24h      Continuous medications  heparin, 0-4,000 Units/hr, Last Rate: 500 Units/hr (10/18/24 1301)  norepinephrine, 0-0.2 mcg/kg/min, Last Rate: Stopped (10/18/24 0215)      PRN medications  PRN medications: dextrose, dextrose, glucagon, heparin (porcine), ondansetron, vancomycin      Assessment/Plan   Assessment & Plan  Sepsis with encephalopathy and septic shock, due to unspecified organism (Multi)  Suspected sepsis secondary to UTI  She has been weaned off Levophed  Continue broad-spectrum antibiotic coverage: On IV Zosyn and vancomycin.  Metabolic encephalopathy  Secondary to UTI with sepsis.  IV antibiotics as above  Elevated troponin  She has been treated as acute coronary syndrome.  Continue heparin drip.  No beta-blocker because of recent hypotension  Acute kidney injury (CMS-HCC)  Secondary to sepsis and hypotension, improving  Chronic systolic heart failure  2D echo report is pending.   Hypothyroidism  On levothyroxine  Seizure disorder (Multi)  On valproic acid.  Seizure precaution  Schizoaffective disorder (Multi)  Per previous records  Abnormal abdominal CT scan  Concern for colonic mass in the cecum on CT abdomen and pelvis done on 10/17  Gastroenterology consult    Plan  Continue IV antibiotics  Await 2D echo report  Started on low  sodium diet  Called her son Casimiro to update, left a message.     Christoph Amaya MD

## 2024-10-19 NOTE — ASSESSMENT & PLAN NOTE
Concern for colonic mass in the cecum on CT abdomen and pelvis done on 10/17  Gastroenterology consult    Plan  Continue IV antibiotics  Await 2D echo report  Started on low sodium diet  Called her son Casimiro to update, left a message.

## 2024-10-19 NOTE — CARE PLAN
The patient's goals for the shift include      The clinical goals for the shift include Pt safety and hemodynamic stability.    Over the shift, the patient did not make progress toward the following goals. Barriers to progression include . Recommendations to address these barriers include .

## 2024-10-19 NOTE — PROGRESS NOTES
Inpatient Speech Language Pathology Dysphagia Treatment note   Reassessment and Revision to POC    Patient Name: Mariella Cuello  MRN: [id number]  : 1945  Today's Date: 10/19/24  Time Calculation  Start Time: 905  Stop Time: 927  Time Calculation (min): 22 min       Total Number of Visits: 1/3 ()    PLAN:  Skilled speech therapy for dysphagia treatment continues to be warranted to provide training and instruction regarding the use of compensatory swallow strategies, to assess tolerance of diet , to determine ability to upgrade diet after PO trials with SLP  SLP TX Plan: Continue Plan of Care, Goals Adjusted  SLP Frequency: 3x per week  Discussed POC: Patient  Discussed Risks/Benefits: Yes  Patient/Caregiver Agreeable: Yes       Recommended Diet:   Solid Diet Recommendations: Soft & bite sized/chopped (IDDSI Level 6)  Liquid Diet Recommendations: Thin (IDDSI Level 0)  Compensatory strategies: small bites/sips, upright 90 degrees for intake   Medication administration:     Subjective:  Pt. Seen at bedside for skilled dysphagia treatment. Pt mentation much improved from 10/18/24, able to maintain adequate JAY throughout session. Responds appropriately to questions.     Pain:  Pain Assessment  Pain Assessment: 0-10  0-10 (Numeric) Pain Score: 0 - No pain         Oxygen Status:   room air          Goals:   -Pt to participate in reassessment of oropharyngeal swallow function via bedside evaluation to assess possible aspiration and to determine least restrictive diet for meeting pt's nutritional and hydration needs.               Goal initiated:  10/18/2024  Target date: 30 days        Baseline: N/A              Today's progress: Pt participates in diagnostic re-evaluation of oropharyngeal swallow function with tolerance demonstrated at the soft/bite sized and thin liquid level. Recommend advancement to PO diet.               Status: Goal MET    -Pt to demonstrate tolerance of PO trials at recommended diet  level (soft/bite sized and thin liquids) without overt s/s of aspiration noted in 9/10 trials in order to maintain nutrition/hydration standards.   Goal initiated:  10/19/2024  Target date: 32 weeks       Baseline: Regular and thin at [facility]              Today's progress: Goal initiated              Status: N/A  -Pt to participate in trials of upgraded diet textures without overt s/s of aspiration present in 9/10 opportunities in order to ensure least restrictive diet.   Goal initiated:  10/19/2024  Target date: 32 weeks       Baseline: Regular and thin at [facility]              Today's progress: Goal initiated              Status: N/A      SLP Assessment:    Pt participates in reassessment of oropharyngeal swallow function with thin liquids, puree textures, soft solid textures, and regular textured items.     Pt tolerates thin liquids (single sip, successive sip, via cup and via straw) and puree textures without any overt s/s of aspiration.     Pt demonstrates prolonged mastication with regular textures, often needing to pause and rest as pt is noted to rapidly fatigue from mastication process alone. She demonstrates improved mastication timing and oral clearance with soft solids trials. No overt s/s of aspiration present across any solid trials.     Approx. 10 minutes following PO trials, pt noted to begin gagging with emesis of clear mucous. Pt positioned onto side and nsg made aware.     Recommend initiation of soft solid textures with thin liquids. Pt anticipated to improve and tolerate return to regular textures as overall medical status improves.     Treatment Outcome:  SLP TX Intervention Outcome: Making Progress Towards Goals    Treatment Tolerance: Patient limited by fatigue   Prognosis: Good       Medical Staff Made Aware: Yes       Education:  Pt. Given skilled instruction on diet recommendations and plan of care  Pt. gave verbal understanding        ELIZABETH Lowe and Dr. Amaya made aware of diet recommendations.

## 2024-10-20 VITALS
SYSTOLIC BLOOD PRESSURE: 104 MMHG | HEIGHT: 64 IN | OXYGEN SATURATION: 93 % | RESPIRATION RATE: 21 BRPM | BODY MASS INDEX: 31.31 KG/M2 | DIASTOLIC BLOOD PRESSURE: 61 MMHG | HEART RATE: 107 BPM | TEMPERATURE: 98.4 F | WEIGHT: 183.42 LBS

## 2024-10-20 LAB
ALBUMIN SERPL BCP-MCNC: 3.3 G/DL (ref 3.4–5)
ANION GAP SERPL CALCULATED.3IONS-SCNC: 13 MMOL/L (ref 10–20)
BUN SERPL-MCNC: 15 MG/DL (ref 6–23)
CALCIUM SERPL-MCNC: 9.1 MG/DL (ref 8.6–10.3)
CHLORIDE SERPL-SCNC: 106 MMOL/L (ref 98–107)
CO2 SERPL-SCNC: 24 MMOL/L (ref 21–32)
CREAT SERPL-MCNC: 0.93 MG/DL (ref 0.5–1.05)
EGFRCR SERPLBLD CKD-EPI 2021: 62 ML/MIN/1.73M*2
ERYTHROCYTE [DISTWIDTH] IN BLOOD BY AUTOMATED COUNT: 13.1 % (ref 11.5–14.5)
GLUCOSE BLD MANUAL STRIP-MCNC: 102 MG/DL (ref 74–99)
GLUCOSE BLD MANUAL STRIP-MCNC: 107 MG/DL (ref 74–99)
GLUCOSE BLD MANUAL STRIP-MCNC: 109 MG/DL (ref 74–99)
GLUCOSE BLD MANUAL STRIP-MCNC: 128 MG/DL (ref 74–99)
GLUCOSE BLD MANUAL STRIP-MCNC: 87 MG/DL (ref 74–99)
GLUCOSE BLD MANUAL STRIP-MCNC: 88 MG/DL (ref 74–99)
GLUCOSE SERPL-MCNC: 84 MG/DL (ref 74–99)
HCT VFR BLD AUTO: 39.9 % (ref 36–46)
HGB BLD-MCNC: 13.4 G/DL (ref 12–16)
MCH RBC QN AUTO: 32.2 PG (ref 26–34)
MCHC RBC AUTO-ENTMCNC: 33.6 G/DL (ref 32–36)
MCV RBC AUTO: 96 FL (ref 80–100)
NRBC BLD-RTO: 0 /100 WBCS (ref 0–0)
PHOSPHATE SERPL-MCNC: 2.3 MG/DL (ref 2.5–4.9)
PLATELET # BLD AUTO: 181 X10*3/UL (ref 150–450)
POTASSIUM SERPL-SCNC: 3.2 MMOL/L (ref 3.5–5.3)
RBC # BLD AUTO: 4.16 X10*6/UL (ref 4–5.2)
SODIUM SERPL-SCNC: 140 MMOL/L (ref 136–145)
WBC # BLD AUTO: 10.8 X10*3/UL (ref 4.4–11.3)

## 2024-10-20 PROCEDURE — 99233 SBSQ HOSP IP/OBS HIGH 50: CPT | Performed by: INTERNAL MEDICINE

## 2024-10-20 PROCEDURE — 2500000001 HC RX 250 WO HCPCS SELF ADMINISTERED DRUGS (ALT 637 FOR MEDICARE OP): Performed by: STUDENT IN AN ORGANIZED HEALTH CARE EDUCATION/TRAINING PROGRAM

## 2024-10-20 PROCEDURE — 2500000001 HC RX 250 WO HCPCS SELF ADMINISTERED DRUGS (ALT 637 FOR MEDICARE OP): Performed by: INTERNAL MEDICINE

## 2024-10-20 PROCEDURE — 84100 ASSAY OF PHOSPHORUS: CPT | Performed by: INTERNAL MEDICINE

## 2024-10-20 PROCEDURE — 85027 COMPLETE CBC AUTOMATED: CPT | Performed by: INTERNAL MEDICINE

## 2024-10-20 PROCEDURE — 82947 ASSAY GLUCOSE BLOOD QUANT: CPT

## 2024-10-20 PROCEDURE — 99232 SBSQ HOSP IP/OBS MODERATE 35: CPT | Performed by: INTERNAL MEDICINE

## 2024-10-20 PROCEDURE — 2500000002 HC RX 250 W HCPCS SELF ADMINISTERED DRUGS (ALT 637 FOR MEDICARE OP, ALT 636 FOR OP/ED): Performed by: STUDENT IN AN ORGANIZED HEALTH CARE EDUCATION/TRAINING PROGRAM

## 2024-10-20 PROCEDURE — 2500000004 HC RX 250 GENERAL PHARMACY W/ HCPCS (ALT 636 FOR OP/ED): Performed by: STUDENT IN AN ORGANIZED HEALTH CARE EDUCATION/TRAINING PROGRAM

## 2024-10-20 PROCEDURE — 2020000001 HC ICU ROOM DAILY

## 2024-10-20 RX ORDER — POTASSIUM CHLORIDE 1.5 G/1.58G
40 POWDER, FOR SOLUTION ORAL ONCE
Status: COMPLETED | OUTPATIENT
Start: 2024-10-20 | End: 2024-10-20

## 2024-10-20 ASSESSMENT — PAIN SCALES - WONG BAKER
WONGBAKER_NUMERICALRESPONSE: NO HURT
WONGBAKER_NUMERICALRESPONSE: NO HURT

## 2024-10-20 ASSESSMENT — PAIN SCALES - GENERAL: PAINLEVEL_OUTOF10: 0 - NO PAIN

## 2024-10-20 NOTE — PROGRESS NOTES
Subjective Data:  Patient is feeling okay.  Denies any chest pain feeling comfortable in bed..    Overnight Events:    Telemetry overnight reviewed no events     Objective Data:  Last Recorded Vitals:  Vitals:    10/20/24 0800 10/20/24 0900 10/20/24 1000 10/20/24 1100   BP: 120/61 114/65     BP Location:       Patient Position:       Pulse: 103 108 104    Resp: 17 18 13    Temp: 36.3 °C (97.3 °F)   36.4 °C (97.5 °F)   TempSrc: Temporal   Temporal   SpO2:    92%   Weight:   83.2 kg (183 lb 6.8 oz)    Height:           Last Labs:  CBC - 10/20/2024:  5:10 AM  10.8 13.4 181    39.9      CMP - 10/20/2024:  5:10 AM  9.1 6.6 31 --- 0.5   2.3 3.3 18 94      PTT - 10/18/2024:  5:07 PM  1.1   11.4 60.2     TROPHS   Date/Time Value Ref Range Status   10/19/2024 04:14  0 - 13 ng/L Final   10/18/2024 04:03  0 - 13 ng/L Final     Comment:     Previous result verified on 10/17/2024 2012 on specimen/case [id number] called with component [facility] for procedure Troponin I, High Sensitivity with value 999 ng/L.   10/18/2024 01:01  0 - 13 ng/L Final     Comment:     Previous result verified on 10/17/2024 2012 on specimen/case [id number] called with component [facility] for procedure Troponin I, High Sensitivity with value 999 ng/L.     BNP   Date/Time Value Ref Range Status   10/17/2024 07:41 PM 1,995 0 - 99 pg/mL Final     HGBA1C   Date/Time Value Ref Range Status   03/01/2022 09:36 AM 4.9 4.3 - 5.6 % Final     Comment:     American Diabetes Association guidelines indicate that patients with HgbA1c in the range 5.7-6.4% are at increased risk for development of diabetes, and intervention by lifestyle modification may be beneficial. HgbA1c greater or equal to 6.5% is considered diagnostic of diabetes.   02/19/2021 06:17 AM 4.6 4.3 - 5.6 % Final     Comment:     American Diabetes Association guidelines indicate that patients with HgbA1c in   the range 5.7-6.4% are at increased risk for development of diabetes, and    intervention by lifestyle modification may be beneficial. HgbA1c greater or   equal to 6.5% is considered diagnostic of diabetes.     LDLCALC   Date/Time Value Ref Range Status   10/02/2023 08:25 AM 66 140 - 190 mg/dL Final     Comment:                                 Near   Borderline      AGE      Desirable  Optimal    High     High     Very High     0-19 Y     0 - 109     ---    110-129   >/= 130     ----    20-24 Y     0 - 119     ---    120-159   >/= 160     ----      >24 Y     0 -  99   100-129  130-159   160-189     >/=190     04/03/2023 07:26 AM 50 65 - 130 MG/DL Final   10/03/2022 07:00 AM 20 65 - 130 MG/DL Final   06/04/2020 10:24 AM 53 65 - 130 MG/DL Final     VLDL   Date/Time Value Ref Range Status   10/02/2023 08:25 AM 19 0 - 40 mg/dL Final   10/11/2021 10:57 AM 20 0 - 40 mg/dL Final      Last I/O:  I/O last 3 completed shifts:  In: 347.5 (4.2 mL/kg) [I.V.:147.5 (1.8 mL/kg); IV Piggyback:200]  Out: 950 (11.5 mL/kg) [Urine:950 (0.3 mL/kg/hr)]  Weight: 82.8 kg     Past Cardiology Tests (Last 3 Years):  EKG:  Electrocardiogram, 12-lead ACS symptoms 10/17/2024 (Preliminary)      ECG 12 Lead       ECG 12 lead 12/18/2023    Echo:  Transthoracic Echo (TTE) Complete 10/18/2024    Ejection Fractions:  EF   Date/Time Value Ref Range Status   10/18/2024 08:38 AM 40 %      Cath:  No results found for this or any previous visit from the past 1095 days.    Stress Test:  No results found for this or any previous visit from the past 1095 days.    Cardiac Imaging:  No results found for this or any previous visit from the past 1095 days.      Inpatient Medications:  Scheduled medications   Medication Dose Route Frequency    aspirin  81 mg oral Daily    atorvastatin  20 mg oral Nightly    pantoprazole  40 mg oral Daily before breakfast    Or    esomeprazole  40 mg nasoduodenal tube Daily before breakfast    Or    pantoprazole  40 mg intravenous Daily before breakfast    insulin lispro  0-5 Units subcutaneous q4h     lamoTRIgine  100 mg oral BID    levothyroxine  88 mcg oral Daily    metoprolol succinate XL  25 mg oral Daily    mupirocin   Topical BID    perflutren protein A microsphere  0.5 mL intravenous Once in imaging    piperacillin-tazobactam  3.375 g intravenous q6h    sulfur hexafluoride microsphr  2 mL intravenous Once in imaging    topiramate  50 mg oral BID     PRN medications   Medication    dextrose    dextrose    glucagon    ondansetron     Continuous Medications   Medication Dose Last Rate    norepinephrine  0-0.2 mcg/kg/min Stopped (10/18/24 0215)       Physical Exam:  General: Patient is in no acute distress.  Obtunded  HEENT: atraumatic normocephalic.  Neck: is supple jugular venous pressure within normal limits no thyromegaly.  Cardiovascular regular rate and rhythm normal heart sounds no murmurs rubs or gallops.  Lungs: clear to auscultation bilaterally.  Abdomen: is soft nontender.  Extremities warm to touch no edema.        Assessment/Plan  1 shock.  Patient presented to the hospital with what appears to be septic shock.  Doubt she is in cardiogenic shock.  Currently she is off pressors.  2D echo showed ejection fraction of 40%.  Patient has new left bundle branch.  Elevated troponin.  Patient has multiple comorbidities.  Discussed with her and her son next to her in the room options of cardiac catheterization.  Patient would like to avoid that for now.  She is feeling tired has multiple comorbidities she is DNR.  We will optimize her treatment for heart failure.      2.  Chronic systolic heart failure ejection fraction of 30 to 35%.  Patient had no ICD placed.  Had coronary disease status post CABG in 2020.  Started on small dose metoprolol 25 mg oral daily her blood pressure dropped slightly will retry again.  Recommend baby aspirin.  Statin.  Not candidate for ACE ARB's or Arni due to hypotension.    3.  Hyperlipidemia recommend high intensity statin.     4.  Coronary artery disease status post CABG  recommend baby aspirin and addition of high intensity statin.     5.  Acute on chronic renal failure management by primary team.  Renal function has improved     Thank you for the consult  CVC 10/17/24 Triple lumen Right Internal jugular (Active)   Line Necessity Intravenous medication therapy 10/20/24 0829   Site Assessment Clean;Dry;Intact 10/20/24 0829   Dressing Status Dry;Clean 10/20/24 0829   Number of days: 3       Peripheral IV 10/17/24 20 G Right Antecubital (Active)   Site Assessment Clean;Dry;Intact 10/20/24 0829   Dressing Status Clean;Dry 10/20/24 0829   Number of days: 3       Peripheral IV 10/17/24 22 G Left;Anterior Hand (Active)   Site Assessment Clean;Dry;Intact 10/20/24 0829   Dressing Status Clean;Dry 10/20/24 0829   Number of days: 3       Urethral Catheter Non-latex;Straight-tip 16 Fr. (Active)   Reason for Continuing Urinary Catheterization accurate hourly measurement of urine volume in a critically ill patient that cannot be assessed by other volumes and urine collection strategies 10/20/24 0829   Number of days: 3       Code Status:  DNR and No Intubation        Nirmala Hammond MD

## 2024-10-20 NOTE — ASSESSMENT & PLAN NOTE
Concern for colonic mass in the cecum on CT abdomen and pelvis done on 10/17  Gastroenterology on consult.    Plan  Continue IV Zosyn  MRSA colonization of the nares: On topical mupirocin for decolonization.  Started on low sodium diet  OK to transfer to SDU

## 2024-10-20 NOTE — ASSESSMENT & PLAN NOTE
She was treated medically for acute coronary syndrome.  Heparin drip has been discontinued.  Started on oral metoprolol

## 2024-10-20 NOTE — PROGRESS NOTES
"Mariella Cuello is a 79 y.o. female on day 2 of admission presenting with Sepsis with encephalopathy and septic shock, due to unspecified organism (Multi).    Subjective   More alert today.  Son at bedside.        Objective     Physical Exam    Last Recorded Vitals  Blood pressure 114/65, pulse 104, temperature 36.4 °C (97.5 °F), temperature source Temporal, resp. rate 13, height 1.626 m (5' 4\"), weight 83.2 kg (183 lb 6.8 oz), SpO2 92%.  Intake/Output last 3 Shifts:  I/O last 3 completed shifts:  In: 347.5 (4.2 mL/kg) [I.V.:147.5 (1.8 mL/kg); IV Piggyback:200]  Out: 950 (11.5 mL/kg) [Urine:950 (0.3 mL/kg/hr)]  Weight: 82.8 kg     Relevant Results  Results for orders placed or performed during the hospital encounter of 10/17/24 (from the past 24 hours)   POCT GLUCOSE   Result Value Ref Range    POCT Glucose 86 74 - 99 mg/dL   POCT GLUCOSE   Result Value Ref Range    POCT Glucose 101 (H) 74 - 99 mg/dL   POCT GLUCOSE   Result Value Ref Range    POCT Glucose 102 (H) 74 - 99 mg/dL   POCT GLUCOSE   Result Value Ref Range    POCT Glucose 88 74 - 99 mg/dL   Renal Function Panel   Result Value Ref Range    Glucose 84 74 - 99 mg/dL    Sodium 140 136 - 145 mmol/L    Potassium 3.2 (L) 3.5 - 5.3 mmol/L    Chloride 106 98 - 107 mmol/L    Bicarbonate 24 21 - 32 mmol/L    Anion Gap 13 10 - 20 mmol/L    Urea Nitrogen 15 6 - 23 mg/dL    Creatinine 0.93 0.50 - 1.05 mg/dL    eGFR 62 >60 mL/min/1.73m*2    Calcium 9.1 8.6 - 10.3 mg/dL    Phosphorus 2.3 (L) 2.5 - 4.9 mg/dL    Albumin 3.3 (L) 3.4 - 5.0 g/dL   CBC   Result Value Ref Range    WBC 10.8 4.4 - 11.3 x10*3/uL    nRBC 0.0 0.0 - 0.0 /100 WBCs    RBC 4.16 4.00 - 5.20 x10*6/uL    Hemoglobin 13.4 12.0 - 16.0 g/dL    Hematocrit 39.9 36.0 - 46.0 %    MCV 96 80 - 100 fL    MCH 32.2 26.0 - 34.0 pg    MCHC 33.6 32.0 - 36.0 g/dL    RDW 13.1 11.5 - 14.5 %    Platelets 181 150 - 450 x10*3/uL   POCT GLUCOSE   Result Value Ref Range    POCT Glucose 87 74 - 99 mg/dL   POCT GLUCOSE   Result Value " Ref Range    POCT Glucose 128 (H) 74 - 99 mg/dL      Transthoracic Echo (TTE) Complete    Result Date: 10/19/2024            [facility] [address], [place] [address]             Phone [phone] TRANSTHORACIC ECHOCARDIOGRAM REPORT Patient Name:      JOSEPHINE ARTIS        Reading Physician:    [id number] Nirmala Hammond MD Study Date:        10/18/2024            Ordering Provider:    [id number] GOMES MRN/PID:           [id number]              Fellow: Accession#:        [id number]          Nurse: Date of Birth/Age: 1945 / 79 years  Sonographer:          Nicolasa Hogan RDCS Gender:            F                     Additional Staff: Height:            162.56 cm             Admit Date: Weight:            83.01 kg              Admission Status:     Inpatient -                                                                Routine BSA / BMI:         1.88 m2 / 31.41 kg/m2 Department Location:  [facility] Blood Pressure: 109 /97 mmHg Study Type:    TRANSTHORACIC ECHO (TTE) COMPLETE Diagnosis/ICD: Dilated cardiomyopathy-I42.0 Indication:    dilated cardiomyopathy CPT Codes:     Echo Complete w Full Doppler-[id number] Patient History: Pertinent History: CHF, HTN, cardiomyopathy, arteriosclerosis of CABG, altered                    mental status. Study Detail: The following Echo studies were performed: 2D, M-Mode, Doppler and               color flow. Technically challenging study due to patient lying in               supine position and body habitus.  PHYSICIAN INTERPRETATION: Left Ventricle: The left ventricular systolic function is moderately decreased, with a visually estimated ejection fraction of 40%. Wall motion is abnormal. The left ventricular cavity size is normal. There is moderate concentric left  ventricular hypertrophy. Spectral Doppler shows a normal pattern of left ventricular diastolic filling. Anteroapical wall severe Hypokinesis. Left Atrium: The left atrium is normal in size. Right Ventricle: The right ventricle is normal in size. There is normal right ventricular global systolic function. Right Atrium: The right atrium is normal in size. Aortic Valve: The aortic valve is trileaflet. The aortic valve dimensionless index is 0.67. There is no evidence of aortic valve regurgitation. The peak instantaneous gradient of the aortic valve is 3.2 mmHg. The mean gradient of the aortic valve is 2.0 mmHg. Mitral Valve: The mitral valve is normal in structure. There is no evidence of mitral valve regurgitation. Tricuspid Valve: The tricuspid valve is structurally normal. There is trace tricuspid regurgitation. The right ventricular systolic pressure is unable to be estimated. Pulmonic Valve: The pulmonic valve is structurally normal. There is physiologic pulmonic valve regurgitation. Pericardium: No pericardial effusion noted. Aorta: The aortic root is normal. Systemic Veins: The inferior vena cava was not well visualized.  CONCLUSIONS:  1. Poorly visualized anatomical structures due to suboptimal image quality.  2. The left ventricular systolic function is moderately decreased, with a visually estimated ejection fraction of 40%.  3. Abnormal wall motion.  4. Anteroapical wall severe Hypokinesis.  5. There is moderate concentric left ventricular hypertrophy.  6. There is normal right ventricular global systolic function.  7. No evidence of mitral valve regurgitation.  8. Trace tricuspid regurgitation is visualized. QUANTITATIVE DATA SUMMARY:  2D MEASUREMENTS:            Normal Ranges: IVSd:            1.68 cm    (0.6-1.1cm) LVPWd:           1.49 cm    (0.6-1.1cm) LVIDd:           3.33 cm    (3.9-5.9cm) LVIDs:           2.63 cm LV Mass Index:   105.3 g/m2 LV % FS          21.0 %  LA VOLUME:                     Normal Ranges: LA Vol A4C:        33.6 ml    (22+/-6mL/m2) LA Vol A2C:        28.4 ml LA Vol BP:         31.4 ml LA Vol Index A4C:  17.8ml/m2 LA Vol Index A2C:  15.1 ml/m2 LA Vol Index BP:   16.7 ml/m2 LA Area A4C:       13.8 cm2 LA Area A2C:       12.5 cm2 LA Major Axis A4C: 4.8 cm LA Major Axis A2C: 4.7 cm LA Volume Index:   16.9 ml/m2 LA Vol A4C:        34.7 ml LA Vol A2C:        28.5 ml LA Vol Index BSA:  16.8 ml/m2  M-MODE MEASUREMENTS:         Normal Ranges: Ao Root:             2.70 cm (2.0-3.7cm) LAs:                 4.30 cm (2.7-4.0cm)  AORTA MEASUREMENTS:         Normal Ranges: Ao Sinus, d:        2.98 cm (2.1-3.5cm) Ao STJ, d:          2.74 cm (1.7-3.4cm) Asc Ao, d:          3.40 cm (2.1-3.4cm)  LV SYSTOLIC FUNCTION BY 2D PLANIMETRY (MOD):                      Normal Ranges: EF-A4C View:    40 % (>=55%) EF-A2C View:    43 % EF-Biplane:     42 % EF-Visual:      40 % LV EF Reported: 40 %  LV DIASTOLIC FUNCTION:           Normal Ranges: MV Peak E:             0.90 m/s  (0.7-1.2 m/s) MV e'                  0.047 m/s (>8.0) MV lateral e'          0.04 m/s MV medial e'           0.05 m/s E/e' Ratio:            19.12     (<8.0)  MITRAL VALVE:          Normal Ranges: MV DT:        103 msec (150-240msec)  AORTIC VALVE:                     Normal Ranges: AoV Vmax:                0.90 m/s (<=1.7m/s) AoV Peak PG:             3.2 mmHg (<20mmHg) AoV Mean P.0 mmHg (1.7-11.5mmHg) LVOT Max Ravinder:            0.75 m/s (<=1.1m/s) AoV VTI:                 15.70 cm (18-25cm) LVOT VTI:                10.50 cm LVOT Diameter:           2.40 cm  (1.8-2.4cm) AoV Area, VTI:           3.03 cm2 (2.5-5.5cm2) AoV Area,Vmax:           3.77 cm2 (2.5-4.5cm2) AoV Dimensionless Index: 0.67  RIGHT VENTRICLE: RV s' 0.12 m/s  [id number] Nirmala Hammond MD Electronically signed on 10/19/2024 at 10:11:19 AM  ** Final **     Electrocardiogram, 12-lead ACS symptoms    Result Date: 10/18/2024  Normal sinus rhythm Nonspecific intraventricular  block Possible Lateral infarct , age undetermined Abnormal ECG When compared with ECG of 18-DEC-2023 21:29, QRS axis Shifted left Inverted T waves have replaced nonspecific T wave abnormality in Anterior leads QT has lengthened    CT abdomen pelvis w IV contrast    Result Date: 10/17/2024  Interpreted By:  Finkelstein, Evan, STUDY: CT ABDOMEN PELVIS W IV CONTRAST;  10/17/2024 10:52 pm   INDICATION: Signs/Symptoms:concern for sepsis.     COMPARISON: None.   ACCESSION NUMBER(S): [id number]   ORDERING CLINICIAN: TEREZA REYES   TECHNIQUE: Axial CT images of the abdomen and pelvis with coronal and sagittal reconstructed images obtained after intravenous administration of 75 mL Omnipaque 350   FINDINGS: LOWER CHEST: Emphysematous changes in the visualized lung bases with bibasilar opacities.   ABDOMEN:   LIVER: Normal attenuation and contour. BILE DUCTS: Normal caliber. GALLBLADDER: The gallbladder is distended without wall thickening or pericholecystic fluid. No calcified gallstones are evident. PORTAL VEIN: Patent SPLEEN: Unremarkable. PANCREAS: Unremarkable. ADRENALS: Unremarkable. KIDNEYS, URETERS and URINARY BLADDER: Symmetric renal enhancement. No hydronephrosis or perinephric fluid collection. Subcentimeter hypodensities in the kidneys are too small to characterize. A Carr catheter partially decompresses the bladder. There is associated wall thickening with mild adjacent stranding. Lucencies in the bladder likely related to instrumentation. REPRODUCTIVE ORGANS: Status post hysterectomy. Small amount of free pelvic fluid.   ABDOMINAL WALL: Within normal limits. PERITONEUM: No ascites, free air or fluid collection.   BOWEL: Gastric wall thickening. No small or large bowel dilatation. Scattered colonic diverticula. No pericolonic inflammatory stranding. Focal wall thickening of the cecum best seen on coronal image 39 of series 5.   VESSELS: No aortic aneurysm. Moderate aortoiliac calcifications.  RETROPERITONEUM: No pathologically enlarged retroperitoneal lymph nodes.   BONES: No acute osseous abnormality.       A Carr catheter partially decompresses the urinary bladder, which limits evaluation. There is, however, associated wall thickening with mild adjacent stranding. Recommend correlation with urinalysis as findings may be seen with cystitis.   There is gallbladder distention, however, no calcified gallstones or wall thickening are evident.   Gastric wall thickening. Correlate for symptoms of gastritis.   Partially imaged bibasilar opacities favored to represent atelectasis. Developing infection is not excluded in the appropriate clinical setting.   Scattered colonic diverticulosis. No CT evidence of acute diverticulitis.   Focal wall thickening of the cecum best seen on coronal image 39 of series 5, with appearance suspicious for a colonic mass. Recommend nonemergent colonoscopy to further evaluate.   MACRO: None.   Signed by: Evan Finkelstein 10/17/2024 11:26 PM Dictation workstation:   [id number]    CT head wo IV contrast    Result Date: 10/17/2024  Interpreted By:  Finkelstein, Evan, STUDY: CT HEAD WO IV CONTRAST;  10/17/2024 10:59 pm   INDICATION: Signs/Symptoms:AMS.     COMPARISON: CT brain 12/18/2023   ACCESSION NUMBER(S): [id number]   ORDERING CLINICIAN: TEREZA REYES   TECHNIQUE: Axial noncontrast CT images of the head with coronal and sagittal reconstructions.   FINDINGS: EXTRACRANIAL SOFT TISSUES: Unremarkable.   CALVARIUM: No depressed skull fracture. No destructive osseous lesion.   PARANASAL SINUSES/MASTOIDS: The visualized paranasal sinuses and mastoid air cells are aerated.   HEMORRHAGE: No acute intracranial hemorrhage.   BRAIN PARENCHYMA: Gray-white matter interfaces are preserved. No mass effect or midline shift. Aneurysmal coils at the skull base are again seen with adjacent streak artifact limiting evaluation. There are nonspecific scattered white matter hypodensities.    VENTRICLES and EXTRA-AXIAL SPACES: Parenchymal atrophy with prominence of the ventricles and cortical sulci.   OTHER FINDINGS: None.       No acute intracranial hemorrhage, mass effect or midline shift.   Nonspecific scattered white matter hypodensities favored to represent sequela of small vessel ischemia.       MACRO: None.   Signed by: Evan Finkelstein 10/17/2024 11:16 PM Dictation workstation:   [id number]    XR chest 1 view    Result Date: 10/17/2024  Interpreted By:  Ivana Levy, STUDY: XR CHEST 1 VIEW;  10/17/2024 8:50 pm   INDICATION: Signs/Symptoms:central line placement, right IJ.   COMPARISON: 10/17/2024 at 7:35 p.m.   ACCESSION NUMBER(S): [id number]   ORDERING CLINICIAN: MELBA PARK   FINDINGS: Right IJ central venous catheter tip is at the level of the mid SVC.   CARDIOMEDIASTINAL SILHOUETTE: Cardiomediastinal silhouette is normal in size and configuration.   LUNGS: No pulmonary consolidation, pleural effusion or pneumothorax. Stable chronic parenchymal scarring/fibrotic changes.   ABDOMEN: No remarkable upper abdominal findings.   BONES: No acute osseous abnormality.       Right IJ central venous catheter tip is at the level of the mid SVC.   No acute cardiopulmonary process.   MACRO: None   Signed by: Ivana Levy 10/17/2024 9:23 PM Dictation workstation:   [id number]    XR chest 1 view    Result Date: 10/17/2024  Interpreted By:  Dev Burch, STUDY: XR CHEST 1 VIEW;  10/17/2024 7:46 pm   INDICATION: Signs/Symptoms:AMS.     COMPARISON: 12/18/2023   ACCESSION NUMBER(S): [id number]   ORDERING CLINICIAN: TEREZA REYES   FINDINGS: PA and lateral radiographs of the chest were provided.       CARDIOMEDIASTINAL SILHOUETTE: Cardiomediastinal silhouette is normal in size and configuration.   LUNGS: Bibasilar atelectasis versus infiltrate.   ABDOMEN: No remarkable upper abdominal findings.   BONES: No acute osseous changes. There are sternotomy wires noted.       1.  Bibasilar atelectasis  versus infiltrate.       MACRO: None   Signed by: Dev Burch 10/17/2024 7:55 PM Dictation workstation:   [id number]    * Cannot find OR log *  Last relevant procedure:           This patient has a central line   Reason for the central line remaining today?     This patient has a urinary catheter   Reason for the urinary catheter remaining today?                Assessment/Plan   Assessment & Plan  Sepsis with encephalopathy and septic shock, due to unspecified organism (Multi)    Metabolic encephalopathy    Chronic systolic heart failure    Hypothyroidism    Seizure disorder (Multi)    Schizoaffective disorder (Multi)    Abnormal abdominal CT scan    Elevated troponin    Acute kidney injury (CMS-HCC)    Abnormal CT  -Concern for mass at cecum. With her hx of dementia, DNR status would be of limited benefit for colonscopy. I I had a long discussion with patient and family, patient and son are declining colonoscopy at this time and do not want any further aggressive workup.     Will sign off at this time.  If patient/family changes their mind feel free to reconsult  Norman Alvarez, ESTELLE-CNP

## 2024-10-20 NOTE — PROGRESS NOTES
Mariella Cuello is a 79 y.o. female on day 2 of admission presenting with Sepsis with encephalopathy and septic shock, due to unspecified organism (Multi).    Subjective   Interval History:   Afebrile, no chills  Son and daughter present  Awake, responsive  Remains off pressors        Review of Systems   All other systems reviewed and are negative.      Objective   Range of Vitals (last 24 hours)  Heart Rate:  []   Temp:  [36.3 °C (97.3 °F)-36.5 °C (97.7 °F)]   Resp:  [8-23]   BP: (114-149)/(55-93)   Weight:  [82.8 kg (182 lb 8.7 oz)-83.2 kg (183 lb 6.8 oz)]   SpO2:  [92 %-97 %]   Daily Weight  10/20/24 : 83.2 kg (183 lb 6.8 oz)    Body mass index is 31.48 kg/m².    Physical Exam  Constitutional:       Comments: Awake, alert  HENT:      Head: Normocephalic and atraumatic.      Nose: Nose normal.      Mouth/Throat:      Mouth: Mucous membranes are moist.      Pharynx: Oropharynx is clear.   Eyes:      General: No scleral icterus.  Neck:      Comments: Right internal jugular catheter   Cardiovascular:      Rate and Rhythm: Normal rate and regular rhythm.   Pulmonary:      Effort: Pulmonary effort is normal.      Breath sounds: Normal breath sounds.   Abdominal:      General: Bowel sounds are normal.      Palpations: Abdomen is soft.   Musculoskeletal:         General: Normal range of motion.      Cervical back: Normal range of motion and neck supple.   Skin:     General: Skin is warm and dry.   Neurological:      Comments: Awake, alert   Psychiatric:         Mood and Affect: Mood normal.         Behavior: Behavior normal.     Antibiotics  mupirocin - 2 %  piperacillin-tazobactam - 3.375 gram/50 mL    Relevant Results  Labs  Results from last 72 hours   Lab Units 10/20/24  0510 10/19/24  0414 10/18/24  0403 10/17/24  1927   WBC AUTO x10*3/uL 10.8 13.2* 15.5* 10.2   HEMOGLOBIN g/dL 13.4 14.2 15.5 17.7*   HEMATOCRIT % 39.9 42.1 46.0 52.5*   PLATELETS AUTO x10*3/uL 181 223 228 306   NEUTROS PCT AUTO %  --  60.0  --   "68.6   LYMPHS PCT AUTO %  --  31.1  --  27.3   MONOS PCT AUTO %  --  7.4  --  3.2   EOS PCT AUTO %  --  0.6  --  0.2     Results from last 72 hours   Lab Units 10/20/24  0510 10/19/24  0414 10/18/24  0403   SODIUM mmol/L 140 138 134*   POTASSIUM mmol/L 3.2* 3.6 3.7   CHLORIDE mmol/L 106 108* 106   CO2 mmol/L 24 23 19*   BUN mg/dL 15 19 21   CREATININE mg/dL 0.93 1.06* 1.16*   GLUCOSE mg/dL 84 96 109*   CALCIUM mg/dL 9.1 9.2 9.4   ANION GAP mmol/L 13 11 13   EGFR mL/min/1.73m*2 62 53* 48*   PHOSPHORUS mg/dL 2.3* 2.6  --      Results from last 72 hours   Lab Units 10/20/24  0510 10/19/24  0414 10/18/24  0403 10/17/24  1927   ALK PHOS U/L  --   --  94 116   BILIRUBIN TOTAL mg/dL  --   --  0.5 0.6   PROTEIN TOTAL g/dL  --   --  6.6 7.9   ALT U/L  --   --  18 23   AST U/L  --   --  31 29   ALBUMIN g/dL 3.3* 3.3* 3.5 4.3     Estimated Creatinine Clearance: 50.3 mL/min (by C-G formula based on SCr of 0.93 mg/dL).  No results found for: \"CRP\"  Microbiology  Susceptibility data from last 14 days.  Collected Specimen Info Organism   10/18/24 Swab from Anterior Nares Methicillin Resistant Staphylococcus aureus (MRSA)     Imaging  Transthoracic Echo (TTE) Complete    Result Date: 10/19/2024            [facility] [address], [place] [id number]             Phone [phone] TRANSTHORACIC ECHOCARDIOGRAM REPORT Patient Name:      JOSEPHINE Medina Physician:    [id number] Nirmala Hammond MD Study Date:        10/18/2024            Ordering Provider:    [id number] BENEDICT GOMES MRN/PID:           [id number]              Fellow: Accession#:        [id number]          Nurse: Date of Birth/Age: 1945 / 79 years  Sonographer:          Nicolasa Hogan RDCS Gender:            F                     Additional Staff: " Height:            162.56 cm             Admit Date: Weight:            83.01 kg              Admission Status:     Inpatient -                                                                Routine BSA / BMI:         1.88 m2 / 31.41 kg/m2 Department Location:  [facility] ICU Blood Pressure: 109 /97 mmHg Study Type:    TRANSTHORACIC ECHO (TTE) COMPLETE Diagnosis/ICD: Dilated cardiomyopathy-I42.0 Indication:    dilated cardiomyopathy CPT Codes:     Echo Complete w Full Doppler-[id number] Patient History: Pertinent History: CHF, HTN, cardiomyopathy, arteriosclerosis of CABG, altered                    mental status. Study Detail: The following Echo studies were performed: 2D, M-Mode, Doppler and               color flow. Technically challenging study due to patient lying in               supine position and body habitus.  PHYSICIAN INTERPRETATION: Left Ventricle: The left ventricular systolic function is moderately decreased, with a visually estimated ejection fraction of 40%. Wall motion is abnormal. The left ventricular cavity size is normal. There is moderate concentric left ventricular hypertrophy. Spectral Doppler shows a normal pattern of left ventricular diastolic filling. Anteroapical wall severe Hypokinesis. Left Atrium: The left atrium is normal in size. Right Ventricle: The right ventricle is normal in size. There is normal right ventricular global systolic function. Right Atrium: The right atrium is normal in size. Aortic Valve: The aortic valve is trileaflet. The aortic valve dimensionless index is 0.67. There is no evidence of aortic valve regurgitation. The peak instantaneous gradient of the aortic valve is 3.2 mmHg. The mean gradient of the aortic valve is 2.0 mmHg. Mitral Valve: The mitral valve is normal in structure. There is no evidence of mitral valve regurgitation. Tricuspid Valve: The tricuspid valve is structurally normal. There is trace tricuspid regurgitation. The right ventricular systolic pressure  is unable to be estimated. Pulmonic Valve: The pulmonic valve is structurally normal. There is physiologic pulmonic valve regurgitation. Pericardium: No pericardial effusion noted. Aorta: The aortic root is normal. Systemic Veins: The inferior vena cava was not well visualized.  CONCLUSIONS:  1. Poorly visualized anatomical structures due to suboptimal image quality.  2. The left ventricular systolic function is moderately decreased, with a visually estimated ejection fraction of 40%.  3. Abnormal wall motion.  4. Anteroapical wall severe Hypokinesis.  5. There is moderate concentric left ventricular hypertrophy.  6. There is normal right ventricular global systolic function.  7. No evidence of mitral valve regurgitation.  8. Trace tricuspid regurgitation is visualized. QUANTITATIVE DATA SUMMARY:  2D MEASUREMENTS:            Normal Ranges: IVSd:            1.68 cm    (0.6-1.1cm) LVPWd:           1.49 cm    (0.6-1.1cm) LVIDd:           3.33 cm    (3.9-5.9cm) LVIDs:           2.63 cm LV Mass Index:   105.3 g/m2 LV % FS          21.0 %  LA VOLUME:                    Normal Ranges: LA Vol A4C:        33.6 ml    (22+/-6mL/m2) LA Vol A2C:        28.4 ml LA Vol BP:         31.4 ml LA Vol Index A4C:  17.8ml/m2 LA Vol Index A2C:  15.1 ml/m2 LA Vol Index BP:   16.7 ml/m2 LA Area A4C:       13.8 cm2 LA Area A2C:       12.5 cm2 LA Major Axis A4C: 4.8 cm LA Major Axis A2C: 4.7 cm LA Volume Index:   16.9 ml/m2 LA Vol A4C:        34.7 ml LA Vol A2C:        28.5 ml LA Vol Index BSA:  16.8 ml/m2  M-MODE MEASUREMENTS:         Normal Ranges: Ao Root:             2.70 cm (2.0-3.7cm) LAs:                 4.30 cm (2.7-4.0cm)  AORTA MEASUREMENTS:         Normal Ranges: Ao Sinus, d:        2.98 cm (2.1-3.5cm) Ao STJ, d:          2.74 cm (1.7-3.4cm) Asc Ao, d:          3.40 cm (2.1-3.4cm)  LV SYSTOLIC FUNCTION BY 2D PLANIMETRY (MOD):                      Normal Ranges: EF-A4C View:    40 % (>=55%) EF-A2C View:    43 % EF-Biplane:     42 %  EF-Visual:      40 % LV EF Reported: 40 %  LV DIASTOLIC FUNCTION:           Normal Ranges: MV Peak E:             0.90 m/s  (0.7-1.2 m/s) MV e'                  0.047 m/s (>8.0) MV lateral e'          0.04 m/s MV medial e'           0.05 m/s E/e' Ratio:            19.12     (<8.0)  MITRAL VALVE:          Normal Ranges: MV DT:        103 msec (150-240msec)  AORTIC VALVE:                     Normal Ranges: AoV Vmax:                0.90 m/s (<=1.7m/s) AoV Peak PG:             3.2 mmHg (<20mmHg) AoV Mean P.0 mmHg (1.7-11.5mmHg) LVOT Max Ravinder:            0.75 m/s (<=1.1m/s) AoV VTI:                 15.70 cm (18-25cm) LVOT VTI:                10.50 cm LVOT Diameter:           2.40 cm  (1.8-2.4cm) AoV Area, VTI:           3.03 cm2 (2.5-5.5cm2) AoV Area,Vmax:           3.77 cm2 (2.5-4.5cm2) AoV Dimensionless Index: 0.67  RIGHT VENTRICLE: RV s' 0.12 m/s  [id number] Nirmala Hammond MD Electronically signed on 10/19/2024 at 10:11:19 AM  ** Final **     Electrocardiogram, 12-lead ACS symptoms    Result Date: 10/18/2024  Normal sinus rhythm Nonspecific intraventricular block Possible Lateral infarct , age undetermined Abnormal ECG When compared with ECG of 18-DEC-2023 21:29, QRS axis Shifted left Inverted T waves have replaced nonspecific T wave abnormality in Anterior leads QT has lengthened    CT abdomen pelvis w IV contrast    Result Date: 10/17/2024  Interpreted By:  Finkelstein, Evan, STUDY: CT ABDOMEN PELVIS W IV CONTRAST;  10/17/2024 10:52 pm   INDICATION: Signs/Symptoms:concern for sepsis.     COMPARISON: None.   ACCESSION NUMBER(S): [id number]   ORDERING CLINICIAN: TEREZA REYES   TECHNIQUE: Axial CT images of the abdomen and pelvis with coronal and sagittal reconstructed images obtained after intravenous administration of 75 mL Omnipaque 350   FINDINGS: LOWER CHEST: Emphysematous changes in the visualized lung bases with bibasilar opacities.   ABDOMEN:   LIVER: Normal attenuation and contour. BILE DUCTS:  Normal caliber. GALLBLADDER: The gallbladder is distended without wall thickening or pericholecystic fluid. No calcified gallstones are evident. PORTAL VEIN: Patent SPLEEN: Unremarkable. PANCREAS: Unremarkable. ADRENALS: Unremarkable. KIDNEYS, URETERS and URINARY BLADDER: Symmetric renal enhancement. No hydronephrosis or perinephric fluid collection. Subcentimeter hypodensities in the kidneys are too small to characterize. A Carr catheter partially decompresses the bladder. There is associated wall thickening with mild adjacent stranding. Lucencies in the bladder likely related to instrumentation. REPRODUCTIVE ORGANS: Status post hysterectomy. Small amount of free pelvic fluid.   ABDOMINAL WALL: Within normal limits. PERITONEUM: No ascites, free air or fluid collection.   BOWEL: Gastric wall thickening. No small or large bowel dilatation. Scattered colonic diverticula. No pericolonic inflammatory stranding. Focal wall thickening of the cecum best seen on coronal image 39 of series 5.   VESSELS: No aortic aneurysm. Moderate aortoiliac calcifications. RETROPERITONEUM: No pathologically enlarged retroperitoneal lymph nodes.   BONES: No acute osseous abnormality.       A Carr catheter partially decompresses the urinary bladder, which limits evaluation. There is, however, associated wall thickening with mild adjacent stranding. Recommend correlation with urinalysis as findings may be seen with cystitis.   There is gallbladder distention, however, no calcified gallstones or wall thickening are evident.   Gastric wall thickening. Correlate for symptoms of gastritis.   Partially imaged bibasilar opacities favored to represent atelectasis. Developing infection is not excluded in the appropriate clinical setting.   Scattered colonic diverticulosis. No CT evidence of acute diverticulitis.   Focal wall thickening of the cecum best seen on coronal image 39 of series 5, with appearance suspicious for a colonic mass. Recommend  nonemergent colonoscopy to further evaluate.   MACRO: None.   Signed by: Evan Finkelstein 10/17/2024 11:26 PM Dictation workstation:   [id number]    CT head wo IV contrast    Result Date: 10/17/2024  Interpreted By:  Finkelstein, Evan, STUDY: CT HEAD WO IV CONTRAST;  10/17/2024 10:59 pm   INDICATION: Signs/Symptoms:AMS.     COMPARISON: CT brain 12/18/2023   ACCESSION NUMBER(S): [id number]   ORDERING CLINICIAN: TEREZA REYES   TECHNIQUE: Axial noncontrast CT images of the head with coronal and sagittal reconstructions.   FINDINGS: EXTRACRANIAL SOFT TISSUES: Unremarkable.   CALVARIUM: No depressed skull fracture. No destructive osseous lesion.   PARANASAL SINUSES/MASTOIDS: The visualized paranasal sinuses and mastoid air cells are aerated.   HEMORRHAGE: No acute intracranial hemorrhage.   BRAIN PARENCHYMA: Gray-white matter interfaces are preserved. No mass effect or midline shift. Aneurysmal coils at the skull base are again seen with adjacent streak artifact limiting evaluation. There are nonspecific scattered white matter hypodensities.   VENTRICLES and EXTRA-AXIAL SPACES: Parenchymal atrophy with prominence of the ventricles and cortical sulci.   OTHER FINDINGS: None.       No acute intracranial hemorrhage, mass effect or midline shift.   Nonspecific scattered white matter hypodensities favored to represent sequela of small vessel ischemia.       MACRO: None.   Signed by: Evan Finkelstein 10/17/2024 11:16 PM Dictation workstation:   [id number]    XR chest 1 view    Result Date: 10/17/2024  Interpreted By:  Ivana Levy, STUDY: XR CHEST 1 VIEW;  10/17/2024 8:50 pm   INDICATION: Signs/Symptoms:central line placement, right IJ.   COMPARISON: 10/17/2024 at 7:35 p.m.   ACCESSION NUMBER(S): [id number]   ORDERING CLINICIAN: MELBA PARK   FINDINGS: Right IJ central venous catheter tip is at the level of the mid SVC.   CARDIOMEDIASTINAL SILHOUETTE: Cardiomediastinal silhouette is normal in size and  configuration.   LUNGS: No pulmonary consolidation, pleural effusion or pneumothorax. Stable chronic parenchymal scarring/fibrotic changes.   ABDOMEN: No remarkable upper abdominal findings.   BONES: No acute osseous abnormality.       Right IJ central venous catheter tip is at the level of the mid SVC.   No acute cardiopulmonary process.   MACRO: None   Signed by: Ivana Levy 10/17/2024 9:23 PM Dictation workstation:   [id number]    XR chest 1 view    Result Date: 10/17/2024  Interpreted By:  Dev Burch, STUDY: XR CHEST 1 VIEW;  10/17/2024 7:46 pm   INDICATION: Signs/Symptoms:AMS.     COMPARISON: 12/18/2023   ACCESSION NUMBER(S): [id number]   ORDERING CLINICIAN: TEREZA REYES   FINDINGS: PA and lateral radiographs of the chest were provided.       CARDIOMEDIASTINAL SILHOUETTE: Cardiomediastinal silhouette is normal in size and configuration.   LUNGS: Bibasilar atelectasis versus infiltrate.   ABDOMEN: No remarkable upper abdominal findings.   BONES: No acute osseous changes. There are sternotomy wires noted.       1.  Bibasilar atelectasis versus infiltrate.       MACRO: None   Signed by: Dev Burch 10/17/2024 7:55 PM Dictation workstation:   [id number]     Assessment/Plan   Septic shock, resolved  Toxic metabolic encephalopathy, resolved  Resolving acute kidney injury  Abnormal abdominal CT-gastroenterology on consult  Pyuria-negative urine culture  Abnormal chest imaging-atelectasis versus infiltrate  MRSA colonization of nares  History of recurrent urinary tract infection-positive cultures for Enterococcus and Enterococcus per my review  Decolonize per protocol  Continue Zosyn  Supportive care  Monitor temperature and WBC  Monitor renal function  Long-term plan is antibiotic till 10/25/2024-Augmentin is an oral option, followed by amoxicillin 500 mg p.o. daily long-term for secondary prophylaxis for recurrent urinary tract infection-discussed with son and daughter    Bruce Amaya MD

## 2024-10-20 NOTE — PROGRESS NOTES
Vancomycin Dosing by Pharmacy- Cessation of Therapy    Consult to pharmacy for vancomycin dosing has been discontinued by the prescriber, pharmacy will sign off at this time.    Please call pharmacy if there are further questions or re-enter a consult if vancomycin is resumed.     Nahid Jones, [facility]

## 2024-10-20 NOTE — ASSESSMENT & PLAN NOTE
Suspected sepsis secondary to UTI  She has been weaned off Levophed  Continue broad-spectrum antibiotic coverage: On IV Zosyn

## 2024-10-20 NOTE — CARE PLAN
The patient's goals for the shift include      The clinical goals for the shift include Pt safety and hemodynamic stability.    Over the shift, the patient did not make progress toward the following goals. Barriers to progression include . Recommendations to address these barriers include   Problem: Heart Failure  Goal: Improved gas exchange this shift  Outcome: Progressing  Goal: Improved urinary output this shift  Outcome: Progressing  Goal: Reduction in peripheral edema within 24 hours  Outcome: Progressing  Goal: Report improvement of dyspnea/breathlessness this shift  Outcome: Progressing  Goal: Weight from fluid excess reduced over 2-3 days, then stabilize  Outcome: Progressing  Goal: Increase self care and/or family involvement in 24 hours  Outcome: Progressing     Problem: Skin  Goal: Decreased wound size/increased tissue granulation at next dressing change  Outcome: Progressing  Flowsheets (Taken 10/19/2024 2327)  Decreased wound size/increased tissue granulation at next dressing change: Protective dressings over bony prominences  Goal: Participates in plan/prevention/treatment measures  Outcome: Progressing  Flowsheets (Taken 10/19/2024 2327)  Participates in plan/prevention/treatment measures: Elevate heels  Goal: Prevent/manage excess moisture  Outcome: Progressing  Flowsheets (Taken 10/19/2024 2327)  Prevent/manage excess moisture: Moisturize dry skin  Goal: Prevent/minimize sheer/friction injuries  Outcome: Progressing  Flowsheets (Taken 10/19/2024 2327)  Prevent/minimize sheer/friction injuries: HOB 30 degrees or less  Goal: Promote/optimize nutrition  Outcome: Progressing  Flowsheets (Taken 10/19/2024 2327)  Promote/optimize nutrition: Assist with feeding  Goal: Promote skin healing  Outcome: Progressing  Flowsheets (Taken 10/19/2024 2327)  Promote skin healing: Assess skin/pad under line(s)/device(s)     Problem: Infection prevention/bleeding  Goal: Infection s/sx managed  Outcome:  Progressing  Goal: No further progression of infection  Outcome: Progressing  Goal: No signs of bleeding  Outcome: Progressing  Goal: Normal coagulation studies  Outcome: Progressing     Problem: Perfusion/Cardiac  Goal: Adequate perfusion to organs/extremities  Outcome: Progressing  Goal: Hemodynamically stable  Outcome: Progressing  Goal: No cardiac arrhythmias  Outcome: Progressing     Problem: Respiratory/Oxygenation  Goal: No signs of respiratory compromise  Outcome: Progressing  Goal: Tolerates activity without increased O2 demands  Outcome: Progressing     Problem: Neuro/Coping  Goal: Minimal anxiety; utilize coping mechanisms  Outcome: Progressing  Goal: No signs of neurological compromise  Outcome: Progressing  Goal: Increase self care/family involvement  Outcome: Progressing     Problem: Fluid/Electrolyte/Nutrition  Goal: Fluid balance within 1 liter of normovolemia  Outcome: Progressing  Goal: No signs of renal failure  Outcome: Progressing  Goal: Normal electrolyte levels  Outcome: Progressing  Goal: Normal glucose levels  Outcome: Progressing  Goal: Tolerates nutritional intake  Outcome: Progressing   .

## 2024-10-20 NOTE — PROGRESS NOTES
"Mariella Cuello is a 79 y.o. female on day 2 of admission presenting with Sepsis with encephalopathy and septic shock, due to UTI.     Subjective   She had been on norepinephrine drip which was discontinued on 10/18.   She was more awake today.  She was oriented to self, place and stated the month and year correctly.  She reports she has no pain.  She had elevated troponin levels of admission and was started on a heparin drip.     Objective     Physical Exam  General: awake, alert, oriented, responsive  Neck: There was a right internal jugular triple-lumen catheter in place with no active bleeding, swelling, warmth or tenderness.  Cardiovascular: regular, normal S1 and S2  Lungs: good air entry bilaterally, clear to auscultation  Abdomen: soft, nontender, bowel sounds present, normoactive  Extremities: no peripheral cyanosis, no pedal edema  Neuro: awake, oriented to self and place, stated the year correctly but not the month.       Last Recorded Vitals  Blood pressure 114/65, pulse 104, temperature 36.3 °C (97.3 °F), temperature source Temporal, resp. rate 13, height 1.626 m (5' 4\"), weight 83.2 kg (183 lb 6.8 oz), SpO2 97%.  Intake/Output last 3 Shifts:  I/O last 3 completed shifts:  In: 347.5 (4.2 mL/kg) [I.V.:147.5 (1.8 mL/kg); IV Piggyback:200]  Out: 950 (11.5 mL/kg) [Urine:950 (0.3 mL/kg/hr)]  Weight: 82.8 kg     Relevant Results  Lab Results   Component Value Date    WBC 10.8 10/20/2024    HGB 13.4 10/20/2024    HCT 39.9 10/20/2024    MCV 96 10/20/2024     10/20/2024     Lab Results   Component Value Date    GLUCOSE 84 10/20/2024    CALCIUM 9.1 10/20/2024     10/20/2024    K 3.2 (L) 10/20/2024    CO2 24 10/20/2024     10/20/2024    BUN 15 10/20/2024    CREATININE 0.93 10/20/2024     Scheduled medications  aspirin, 81 mg, oral, Daily  atorvastatin, 20 mg, oral, Nightly  pantoprazole, 40 mg, oral, Daily before breakfast   Or  esomeprazole, 40 mg, nasoduodenal tube, Daily before breakfast   " Or  pantoprazole, 40 mg, intravenous, Daily before breakfast  insulin lispro, 0-5 Units, subcutaneous, q4h  lamoTRIgine, 100 mg, oral, BID  levothyroxine, 88 mcg, oral, Daily  metoprolol succinate XL, 25 mg, oral, Daily  mupirocin, , Topical, BID  perflutren protein A microsphere, 0.5 mL, intravenous, Once in imaging  piperacillin-tazobactam, 3.375 g, intravenous, q6h  sulfur hexafluoride microsphr, 2 mL, intravenous, Once in imaging  topiramate, 50 mg, oral, BID      Continuous medications  norepinephrine, 0-0.2 mcg/kg/min, Last Rate: Stopped (10/18/24 0215)      PRN medications  PRN medications: dextrose, dextrose, glucagon, ondansetron      Assessment/Plan   Assessment & Plan  Sepsis with encephalopathy and septic shock, due to unspecified organism (Multi)  Suspected sepsis secondary to UTI  She has been weaned off Levophed  Continue broad-spectrum antibiotic coverage: On IV Zosyn  Metabolic encephalopathy  Secondary to UTI with sepsis.  IV antibiotics as above  Clinically improved  Elevated troponin  She was treated medically for acute coronary syndrome.  Heparin drip has been discontinued.  Started on oral metoprolol  Acute kidney injury (CMS-HCC)  Secondary to sepsis and hypotension, improving  Chronic systolic heart failure  LV ejection fraction 40% on 2D echo done on 10/18  On oral metoprolol.  Cardiology following  Hypothyroidism  On levothyroxine  Seizure disorder (Multi)  On valproic acid.  Seizure precaution  Schizoaffective disorder (Multi)  Per previous records  Abnormal abdominal CT scan  Concern for colonic mass in the cecum on CT abdomen and pelvis done on 10/17  Gastroenterology on consult.    Plan  Continue IV Zosyn  MRSA colonization of the nares: On topical mupirocin for decolonization.  Started on low sodium diet  OK to transfer to SDU    Christoph Amaya MD

## 2024-10-21 LAB
ALBUMIN SERPL BCP-MCNC: 3.2 G/DL (ref 3.4–5)
ANION GAP SERPL CALCULATED.3IONS-SCNC: 10 MMOL/L (ref 10–20)
BUN SERPL-MCNC: 11 MG/DL (ref 6–23)
CALCIUM SERPL-MCNC: 9 MG/DL (ref 8.6–10.3)
CHLORIDE SERPL-SCNC: 109 MMOL/L (ref 98–107)
CO2 SERPL-SCNC: 26 MMOL/L (ref 21–32)
CREAT SERPL-MCNC: 1.05 MG/DL (ref 0.5–1.05)
EGFRCR SERPLBLD CKD-EPI 2021: 54 ML/MIN/1.73M*2
ERYTHROCYTE [DISTWIDTH] IN BLOOD BY AUTOMATED COUNT: 13.1 % (ref 11.5–14.5)
GLUCOSE BLD MANUAL STRIP-MCNC: 101 MG/DL (ref 74–99)
GLUCOSE BLD MANUAL STRIP-MCNC: 104 MG/DL (ref 74–99)
GLUCOSE BLD MANUAL STRIP-MCNC: 113 MG/DL (ref 74–99)
GLUCOSE BLD MANUAL STRIP-MCNC: 115 MG/DL (ref 74–99)
GLUCOSE BLD MANUAL STRIP-MCNC: 89 MG/DL (ref 74–99)
GLUCOSE BLD MANUAL STRIP-MCNC: 95 MG/DL (ref 74–99)
GLUCOSE SERPL-MCNC: 94 MG/DL (ref 74–99)
HCT VFR BLD AUTO: 36.8 % (ref 36–46)
HGB BLD-MCNC: 12.4 G/DL (ref 12–16)
MCH RBC QN AUTO: 32.2 PG (ref 26–34)
MCHC RBC AUTO-ENTMCNC: 33.7 G/DL (ref 32–36)
MCV RBC AUTO: 96 FL (ref 80–100)
NRBC BLD-RTO: 0 /100 WBCS (ref 0–0)
PHOSPHATE SERPL-MCNC: 2.1 MG/DL (ref 2.5–4.9)
PLATELET # BLD AUTO: 160 X10*3/UL (ref 150–450)
POTASSIUM SERPL-SCNC: 3.2 MMOL/L (ref 3.5–5.3)
RBC # BLD AUTO: 3.85 X10*6/UL (ref 4–5.2)
SODIUM SERPL-SCNC: 142 MMOL/L (ref 136–145)
WBC # BLD AUTO: 8.5 X10*3/UL (ref 4.4–11.3)

## 2024-10-21 PROCEDURE — 1200000002 HC GENERAL ROOM WITH TELEMETRY DAILY

## 2024-10-21 PROCEDURE — 2500000002 HC RX 250 W HCPCS SELF ADMINISTERED DRUGS (ALT 637 FOR MEDICARE OP, ALT 636 FOR OP/ED): Performed by: STUDENT IN AN ORGANIZED HEALTH CARE EDUCATION/TRAINING PROGRAM

## 2024-10-21 PROCEDURE — 82947 ASSAY GLUCOSE BLOOD QUANT: CPT

## 2024-10-21 PROCEDURE — 2500000001 HC RX 250 WO HCPCS SELF ADMINISTERED DRUGS (ALT 637 FOR MEDICARE OP): Performed by: INTERNAL MEDICINE

## 2024-10-21 PROCEDURE — 85027 COMPLETE CBC AUTOMATED: CPT | Performed by: INTERNAL MEDICINE

## 2024-10-21 PROCEDURE — 2500000001 HC RX 250 WO HCPCS SELF ADMINISTERED DRUGS (ALT 637 FOR MEDICARE OP): Performed by: STUDENT IN AN ORGANIZED HEALTH CARE EDUCATION/TRAINING PROGRAM

## 2024-10-21 PROCEDURE — 51702 INSERT TEMP BLADDER CATH: CPT

## 2024-10-21 PROCEDURE — 92526 ORAL FUNCTION THERAPY: CPT | Mod: GN

## 2024-10-21 PROCEDURE — 99232 SBSQ HOSP IP/OBS MODERATE 35: CPT | Performed by: INTERNAL MEDICINE

## 2024-10-21 PROCEDURE — 2500000002 HC RX 250 W HCPCS SELF ADMINISTERED DRUGS (ALT 637 FOR MEDICARE OP, ALT 636 FOR OP/ED): Performed by: INTERNAL MEDICINE

## 2024-10-21 PROCEDURE — 2500000004 HC RX 250 GENERAL PHARMACY W/ HCPCS (ALT 636 FOR OP/ED): Performed by: STUDENT IN AN ORGANIZED HEALTH CARE EDUCATION/TRAINING PROGRAM

## 2024-10-21 PROCEDURE — 80069 RENAL FUNCTION PANEL: CPT | Performed by: INTERNAL MEDICINE

## 2024-10-21 RX ORDER — AMOXICILLIN AND CLAVULANATE POTASSIUM 875; 125 MG/1; MG/1
1 TABLET, FILM COATED ORAL EVERY 12 HOURS
Qty: 8 TABLET | Refills: 0 | Status: SHIPPED | OUTPATIENT
Start: 2024-10-21 | End: 2024-10-25

## 2024-10-21 RX ORDER — AMOXICILLIN 500 MG/1
500 CAPSULE ORAL DAILY
Qty: 30 CAPSULE | Refills: 2 | Status: SHIPPED | OUTPATIENT
Start: 2024-10-26 | End: 2025-01-24

## 2024-10-21 RX ORDER — POTASSIUM CHLORIDE 20 MEQ/1
40 TABLET, EXTENDED RELEASE ORAL 2 TIMES DAILY
Status: COMPLETED | OUTPATIENT
Start: 2024-10-21 | End: 2024-10-21

## 2024-10-21 ASSESSMENT — PAIN SCALES - GENERAL
PAINLEVEL_OUTOF10: 0 - NO PAIN

## 2024-10-21 ASSESSMENT — PAIN - FUNCTIONAL ASSESSMENT
PAIN_FUNCTIONAL_ASSESSMENT: 0-10
PAIN_FUNCTIONAL_ASSESSMENT: FLACC (FACE, LEGS, ACTIVITY, CRY, CONSOLABILITY)
PAIN_FUNCTIONAL_ASSESSMENT: 0-10

## 2024-10-21 ASSESSMENT — PAIN SCALES - WONG BAKER
WONGBAKER_NUMERICALRESPONSE: NO HURT

## 2024-10-21 NOTE — NURSING NOTE
Vascular access note  Patient with Rt internal jugular TLC, dressing with old blood under tegaderm, 1 lumen in use without difficulty, other 2 lumens flush easily and with positive blood return, clamped and curos caps applied. Will verify continued need for central line, will either remove line or change dressing. Will follow up with RN.

## 2024-10-21 NOTE — PROGRESS NOTES
Mariella Cuello is a 79 y.o. female on day 3 of admission presenting with Sepsis with encephalopathy and septic shock, due to unspecified organism (Multi).      Subjective   No acute events overnight.  Son and daughter in room during my visit.       Objective     Last Recorded Vitals  /81   Pulse 86   Temp 36.3 °C (97.3 °F) (Temporal)   Resp 15   Wt 83.2 kg (183 lb 6.8 oz)   SpO2 95%   Intake/Output last 3 Shifts:    Intake/Output Summary (Last 24 hours) at 10/21/2024 1555  Last data filed at 10/21/2024 1229  Gross per 24 hour   Intake 480 ml   Output 1225 ml   Net -745 ml       Admission Weight  Weight: 81.6 kg (180 lb) (10/17/24 1946)    Daily Weight  10/21/24 : 83.2 kg (183 lb 6.8 oz)    Image Results  Transthoracic Echo (TTE) Complete              [facility]  [address] [place], [place] [address]              Phone [phone]    TRANSTHORACIC ECHOCARDIOGRAM REPORT    Patient Name:      MARIELLA CUELLO        Reading Physician:    [id number] Nirmala Hammond MD  Study Date:        10/18/2024            Ordering Provider:    [id number] BENEDICT GOMES  MRN/PID:           [id number]              Fellow:  Accession#:        [id number]          Nurse:  Date of Birth/Age: 1945 / 79 years  Sonographer:          Nicolasa Hogan RDCS  Gender:            F                     Additional Staff:  Height:            162.56 cm             Admit Date:  Weight:            83.01 kg              Admission Status:     Inpatient -                                                                 Routine  BSA / BMI:         1.88 m2 / 31.41 kg/m2 Department Location:  [facility] ICU  Blood Pressure: 109 /97 mmHg    Study Type:    TRANSTHORACIC ECHO (TTE) COMPLETE  Diagnosis/ICD: Dilated cardiomyopathy-I42.0  Indication:     dilated cardiomyopathy  CPT Codes:     Echo Complete w Full [id number]    Patient History:  Pertinent History: CHF, HTN, cardiomyopathy, arteriosclerosis of CABG, altered                     mental status.    Study Detail: The following Echo studies were performed: 2D, M-Mode, Doppler and                color flow. Technically challenging study due to patient lying in                supine position and body habitus.       PHYSICIAN INTERPRETATION:  Left Ventricle: The left ventricular systolic function is moderately decreased, with a visually estimated ejection fraction of 40%. Wall motion is abnormal. The left ventricular cavity size is normal. There is moderate concentric left ventricular hypertrophy. Spectral Doppler shows a normal pattern of left ventricular diastolic filling. Anteroapical wall severe Hypokinesis.  Left Atrium: The left atrium is normal in size.  Right Ventricle: The right ventricle is normal in size. There is normal right ventricular global systolic function.  Right Atrium: The right atrium is normal in size.  Aortic Valve: The aortic valve is trileaflet. The aortic valve dimensionless index is 0.67. There is no evidence of aortic valve regurgitation. The peak instantaneous gradient of the aortic valve is 3.2 mmHg. The mean gradient of the aortic valve is 2.0 mmHg.  Mitral Valve: The mitral valve is normal in structure. There is no evidence of mitral valve regurgitation.  Tricuspid Valve: The tricuspid valve is structurally normal. There is trace tricuspid regurgitation. The right ventricular systolic pressure is unable to be estimated.  Pulmonic Valve: The pulmonic valve is structurally normal. There is physiologic pulmonic valve regurgitation.  Pericardium: No pericardial effusion noted.  Aorta: The aortic root is normal.  Systemic Veins: The inferior vena cava was not well visualized.       CONCLUSIONS:   1. Poorly visualized anatomical structures due to suboptimal image quality.   2. The  left ventricular systolic function is moderately decreased, with a visually estimated ejection fraction of 40%.   3. Abnormal wall motion.   4. Anteroapical wall severe Hypokinesis.   5. There is moderate concentric left ventricular hypertrophy.   6. There is normal right ventricular global systolic function.   7. No evidence of mitral valve regurgitation.   8. Trace tricuspid regurgitation is visualized.    QUANTITATIVE DATA SUMMARY:     2D MEASUREMENTS:            Normal Ranges:  IVSd:            1.68 cm    (0.6-1.1cm)  LVPWd:           1.49 cm    (0.6-1.1cm)  LVIDd:           3.33 cm    (3.9-5.9cm)  LVIDs:           2.63 cm  LV Mass Index:   105.3 g/m2  LV % FS          21.0 %       LA VOLUME:                    Normal Ranges:  LA Vol A4C:        33.6 ml    (22+/-6mL/m2)  LA Vol A2C:        28.4 ml  LA Vol BP:         31.4 ml  LA Vol Index A4C:  17.8ml/m2  LA Vol Index A2C:  15.1 ml/m2  LA Vol Index BP:   16.7 ml/m2  LA Area A4C:       13.8 cm2  LA Area A2C:       12.5 cm2  LA Major Axis A4C: 4.8 cm  LA Major Axis A2C: 4.7 cm  LA Volume Index:   16.9 ml/m2  LA Vol A4C:        34.7 ml  LA Vol A2C:        28.5 ml  LA Vol Index BSA:  16.8 ml/m2       M-MODE MEASUREMENTS:         Normal Ranges:  Ao Root:             2.70 cm (2.0-3.7cm)  LAs:                 4.30 cm (2.7-4.0cm)       AORTA MEASUREMENTS:         Normal Ranges:  Ao Sinus, d:        2.98 cm (2.1-3.5cm)  Ao STJ, d:          2.74 cm (1.7-3.4cm)  Asc Ao, d:          3.40 cm (2.1-3.4cm)       LV SYSTOLIC FUNCTION BY 2D PLANIMETRY (MOD):                       Normal Ranges:  EF-A4C View:    40 % (>=55%)  EF-A2C View:    43 %  EF-Biplane:     42 %  EF-Visual:      40 %  LV EF Reported: 40 %       LV DIASTOLIC FUNCTION:           Normal Ranges:  MV Peak E:             0.90 m/s  (0.7-1.2 m/s)  MV e'                  0.047 m/s (>8.0)  MV lateral e'          0.04 m/s  MV medial e'           0.05 m/s  E/e' Ratio:            19.12     (<8.0)       MITRAL VALVE:           Normal Ranges:  MV DT:        103 msec (150-240msec)       AORTIC VALVE:                     Normal Ranges:  AoV Vmax:                0.90 m/s (<=1.7m/s)  AoV Peak PG:             3.2 mmHg (<20mmHg)  AoV Mean P.0 mmHg (1.7-11.5mmHg)  LVOT Max Ravinder:            0.75 m/s (<=1.1m/s)  AoV VTI:                 15.70 cm (18-25cm)  LVOT VTI:                10.50 cm  LVOT Diameter:           2.40 cm  (1.8-2.4cm)  AoV Area, VTI:           3.03 cm2 (2.5-5.5cm2)  AoV Area,Vmax:           3.77 cm2 (2.5-4.5cm2)  AoV Dimensionless Index: 0.67       RIGHT VENTRICLE:  RV s' 0.12 m/s       [id number] Nirmala Hammond MD  Electronically signed on 10/19/2024 at 10:11:19 AM       ** Final **      Physical Exam  Generally no acute distress  HEENT PERRL EOMI  Cardiovascular S1-S2 regular rate rhythm  Lungs diminished bilaterally bases  Abdomen bowel sounds present nontender  Extremities no clubbing cyanosis edema  Relevant Results  Scheduled medications  aspirin, 81 mg, oral, Daily  atorvastatin, 20 mg, oral, Nightly  pantoprazole, 40 mg, oral, Daily before breakfast   Or  esomeprazole, 40 mg, nasoduodenal tube, Daily before breakfast   Or  pantoprazole, 40 mg, intravenous, Daily before breakfast  insulin lispro, 0-5 Units, subcutaneous, q4h  lamoTRIgine, 100 mg, oral, BID  levothyroxine, 88 mcg, oral, Daily  metoprolol succinate XL, 25 mg, oral, Daily  mupirocin, , Topical, BID  perflutren protein A microsphere, 0.5 mL, intravenous, Once in imaging  piperacillin-tazobactam, 3.375 g, intravenous, q6h  potassium chloride CR, 40 mEq, oral, BID  sulfur hexafluoride microsphr, 2 mL, intravenous, Once in imaging  topiramate, 50 mg, oral, BID      Continuous medications  norepinephrine, 0-0.2 mcg/kg/min, Last Rate: Stopped (10/18/24 0215)      PRN medications  PRN medications: dextrose, dextrose, glucagon, ondansetron  Results for orders placed or performed during the hospital encounter of 10/17/24 (from the past 24 hours)   POCT  GLUCOSE   Result Value Ref Range    POCT Glucose 107 (H) 74 - 99 mg/dL   POCT GLUCOSE   Result Value Ref Range    POCT Glucose 109 (H) 74 - 99 mg/dL   POCT GLUCOSE   Result Value Ref Range    POCT Glucose 89 74 - 99 mg/dL   POCT GLUCOSE   Result Value Ref Range    POCT Glucose 101 (H) 74 - 99 mg/dL   CBC   Result Value Ref Range    WBC 8.5 4.4 - 11.3 x10*3/uL    nRBC 0.0 0.0 - 0.0 /100 WBCs    RBC 3.85 (L) 4.00 - 5.20 x10*6/uL    Hemoglobin 12.4 12.0 - 16.0 g/dL    Hematocrit 36.8 36.0 - 46.0 %    MCV 96 80 - 100 fL    MCH 32.2 26.0 - 34.0 pg    MCHC 33.7 32.0 - 36.0 g/dL    RDW 13.1 11.5 - 14.5 %    Platelets 160 150 - 450 x10*3/uL   Renal Function Panel   Result Value Ref Range    Glucose 94 74 - 99 mg/dL    Sodium 142 136 - 145 mmol/L    Potassium 3.2 (L) 3.5 - 5.3 mmol/L    Chloride 109 (H) 98 - 107 mmol/L    Bicarbonate 26 21 - 32 mmol/L    Anion Gap 10 10 - 20 mmol/L    Urea Nitrogen 11 6 - 23 mg/dL    Creatinine 1.05 0.50 - 1.05 mg/dL    eGFR 54 (L) >60 mL/min/1.73m*2    Calcium 9.0 8.6 - 10.3 mg/dL    Phosphorus 2.1 (L) 2.5 - 4.9 mg/dL    Albumin 3.2 (L) 3.4 - 5.0 g/dL   POCT GLUCOSE   Result Value Ref Range    POCT Glucose 95 74 - 99 mg/dL   POCT GLUCOSE   Result Value Ref Range    POCT Glucose 104 (H) 74 - 99 mg/dL     *Note: Due to a large number of results and/or encounters for the requested time period, some results have not been displayed. A complete set of results can be found in Results Review.     Impression: 70-year-old woman admitted for sepsis and found to have colonic mass.     Plan:     1.  Encephalopathy-sepsis  -Resolving, appreciate infectious disease input.  Will continue with the recommendations and long-term suppression for UTI with amoxicillin     2.  Acute renal failure  -Resolved, appreciate renal input     3.  Cecal mass  -Patient has significant dementia unable to make medical decision for self.  Speaking with family with my previous colleague and GI, decision was not to proceed with  colonoscopy as the family does not feel patient be a good candidate for chemotherapy or surgical resection given her bedbound state and mental decline due to dementia.  Spent time today with family again talking about this situation and I believe her acting in the best interest of their mother.     4.  Schizoaffective disorder  -Supportive care                Assessment/Plan        This patient has a central line   Reason for the central line remaining today? Line unnecessary, will be removed today    This patient has a urinary catheter   Reason for the urinary catheter remaining today? Urine catheter unnecessary, will be removed today          Assessment & Plan                  Moose Olson MD

## 2024-10-21 NOTE — CONSULTS
"Nutrition Assessement Note    Nutrition Assessment    Reason for Assessment: Admission nursing screening    Reason for Hospital Admission:  Mariella Cuello is a 79 y.o. female who is admitted for septic shock, CHF. Abnormal CT with mass in cecum - no plans for coloscopy per family. Hx dementia from LTC - pt provided minimal info.    Past Medical History:   Diagnosis Date    Alzheimer disease (Multi)     Anemia     Atherosclerotic heart disease of native coronary artery without angina pectoris 05/12/2021    Atherosclerosis of coronary artery without angina pectoris, unspecified vessel or lesion type, unspecified whether native or transplanted heart    Bipolar disorder (Multi)     Cardiomyopathy (Multi)     Chronic systolic (congestive) heart failure (Multi) 05/12/2021    Chronic systolic heart failure, ACC/AHA stage C    Depression     Dysphagia     Gastritis     History of COVID-19     Hyperlipidemia     Hypertension     Hypothyroidism     Major depressive disorder, recurrent, unspecified (CMS-HCC) 05/12/2021    Major depression, recurrent    Obstipation     Respiratory failure (Multi)     Schizoaffective disorder (Multi)     Spinal stenosis     Stroke (Multi)     Systolic heart failure (Multi)       Past Surgical History:   Procedure Laterality Date    CATARACT EXTRACTION  02/29/2016    Cataract Surgery    CORONARY ARTERY BYPASS GRAFT      MR HEAD ANGIO WO IV CONTRAST  01/22/2014    MR HEAD ANGIO WO IV CONTRAST [facility]    TONSILLECTOMY  02/29/2016    Tonsillectomy    TOTAL ABDOMINAL HYSTERECTOMY  02/29/2016    Total Abdominal Hysterectomy     Nutrition History:  Food and Nutrient History: kristine reports having a good appetite. eating lunch at this visit with no difficulties.  Energy Intake: Fair 50-75 %    Anthropometrics:  Ht: 162.6 cm (5' 4\"), Wt: 83.2 kg (183 lb 6.8 oz), BMI: 31.47  IBW/kg (Dietitian Calculated): 54.55 kg  Percent of IBW: 153 %  Adjusted Body Weight (kg): 61.7 kg    Weight " Change:  Daily Weight  10/21/24 : 83.2 kg (183 lb 6.8 oz)  09/27/24 : 81.7 kg (180 lb 3.2 oz)  09/12/24 : 81.7 kg (180 lb 3.2 oz)  08/08/24 : 80.1 kg (176 lb 9.6 oz)  07/05/24 : 83.1 kg (183 lb 3.2 oz)  06/17/24 : 85.3 kg (188 lb)  02/16/24 : 87.3 kg (192 lb 6.4 oz)  12/22/23 : 90.7 kg (200 lb)  12/21/23 : 98.8 kg (217 lb 13 oz)  10/05/23 : 94.1 kg (207 lb 8 oz)  04/17/23 : 91.4 kg (201 lb 9.6 oz)  04/07/23 : 91.4 kg (201 lb 9.6 oz)  03/10/23 : 91.8 kg (202 lb 6.4 oz)  03/03/23 : 91.3 kg (201 lb 4.8 oz)  02/24/23 : 91.3 kg (201 lb 4.8 oz)  08/25/22 : 89.3 kg (196 lb 12.8 oz)  07/11/22 : 88.1 kg (194 lb 4.8 oz)  07/08/22 : 88.1 kg (194 lb 4.8 oz)  06/23/22 : 88.9 kg (196 lb)  04/28/22 : 90.8 kg (200 lb 3.2 oz)     Weight History / % Weight Change: per wt hx, pt with no significant wt changes over the past ~3 months. prior to that, wt loss noted from Dec 2023 to July 2024.  Significant Weight Loss: No (non-significant yet undesirable)  Interpretation of Weight Loss:  (17# (8.5%0 wt loss over ~6.5 months (12/22/23-7/5/24))    Nutrition Focused Physical Exam Findings:   Subcutaneous Fat Loss  Orbital Fat Pads: Well nourished (slightly bulging fat pads)  Buccal Fat Pads: Well nourished (full, rounded cheeks)    Muscle Wasting  Temporalis: Well nourished (well-defined muscle)  Pectoralis (Clavicular Region): Well nourished (clavicle not visible)  Deltoid/Trapezius: Well nourished (rounded appearance at arm, shoulder, neck)    Nutrition Significant Labs:  Lab Results   Component Value Date    WBC 8.5 10/21/2024    HGB 12.4 10/21/2024    HCT 36.8 10/21/2024     10/21/2024    CHOL 138 10/02/2023    TRIG 96 10/02/2023    HDL 52.8 10/02/2023    ALT 18 10/18/2024    AST 31 10/18/2024     10/21/2024    K 3.2 (L) 10/21/2024     (H) 10/21/2024    CREATININE 1.05 10/21/2024    BUN 11 10/21/2024    CO2 26 10/21/2024    TSH 4.35 (H) 10/17/2024    INR 1.1 12/18/2023    HGBA1C 4.9 03/01/2022     Nutrition Specific  Medications:  aspirin, 81 mg, oral, Daily  atorvastatin, 20 mg, oral, Nightly  pantoprazole, 40 mg, oral, Daily before breakfast   Or  esomeprazole, 40 mg, nasoduodenal tube, Daily before breakfast   Or  pantoprazole, 40 mg, intravenous, Daily before breakfast  insulin lispro, 0-5 Units, subcutaneous, q4h  lamoTRIgine, 100 mg, oral, BID  levothyroxine, 88 mcg, oral, Daily  metoprolol succinate XL, 25 mg, oral, Daily  mupirocin, , Topical, BID  perflutren protein A microsphere, 0.5 mL, intravenous, Once in imaging  piperacillin-tazobactam, 3.375 g, intravenous, q6h  potassium chloride CR, 40 mEq, oral, BID  sulfur hexafluoride microsphr, 2 mL, intravenous, Once in imaging  topiramate, 50 mg, oral, BID      norepinephrine, 0-0.2 mcg/kg/min, Last Rate: Stopped (10/18/24 0215)      Dietary Orders (From admission, onward)       Start     Ordered    10/21/24 1450  Oral nutritional supplements  Until discontinued        Question Answer Comment   Deliver with Dinner    Select supplement: Ensure Compact        10/21/24 1449    10/19/24 0941  Adult diet 2-3 grams sodium; Soft and bite sized 6; Thin 0  Diet effective now        Question Answer Comment   Diet type 2-3 grams sodium    Texture Soft and bite sized 6    Fluid consistency Thin 0        10/19/24 0941    10/18/24 0237  May Participate in Room Service  ( ROOM SERVICE MAY PARTICIPATE)  Once        Question:  .  Answer:  Yes    10/18/24 0236                  Estimated Needs:   Estimated Energy Needs  Total Energy Estimated Needs (kCal): 1543 kCal  Total Estimated Energy Need per Day (kCal/kg): 25 kCal/kg  Method for Estimating Needs: ABW    Estimated Protein Needs  Total Protein Estimated Needs (g): 70 g  Total Protein Estimated Needs (g/kg): 1.2 g/kg  Method for Estimating Needs: ABW    Estimated Fluid Needs  Method for Estimating Needs: 1 ml/kcal or per MD        Nutrition Diagnosis   Nutrition Diagnosis:  Malnutrition Diagnosis  Patient has Malnutrition Diagnosis:  No    Nutrition Diagnosis  Patient has Nutrition Diagnosis: Yes  Diagnosis Status (1): New  Nutrition Diagnosis 1: Increased nutrient needs  Related to (1): increased demand for nutrients  As Evidenced by (1): conditions associated with diagnosis       Nutrition Interventions/Recommendations   Nutrition Interventions and Recommendations:    Nutrition Prescription:  Individualized Nutrition Prescription Provided for : 1543 kcals and 74g protein to be provided via diet and supplements    Nutrition Interventions:   Food and/or Nutrient Delivery Interventions  Interventions: Meals and snacks, Medical food supplement  Meals and Snacks: Texture-modified diet, Mineral-modified diet  Goal: provide per SLP recs  Medical Food Supplement: Commercial beverage  Goal: ensure compact once daily to provide 220 kcals and 9g protein    Education Documentation  No documentation found.           Nutrition Monitoring and Evaluation   Monitoring/Evaluation:   Food/Nutrient Related History Monitoring  Monitoring and Evaluation Plan: Energy intake  Energy Intake: Estimated energy intake  Criteria: pt to consume >/= 75% estimated needs       Time Spent/Follow-up:   Follow Up  Time Spent (min): 30 minutes  Last Date of Nutrition Visit: 10/21/24  Nutrition Follow-Up Needed?: 7-10 days  Follow up Comment: 10/29/24

## 2024-10-21 NOTE — PROGRESS NOTES
Mariella Cuello is a 79 y.o. female on day 3 of admission presenting with Sepsis with encephalopathy and septic shock, due to unspecified organism (Multi).    Subjective   Interval History:   Afebrile, no chills  Awake, responsive  Remains on room air  No nausea, vomiting or diarrhea       Objective   Range of Vitals (last 24 hours)  Heart Rate:  []   Temp:  [36.3 °C (97.3 °F)-36.9 °C (98.4 °F)]   Resp:  [14-21]   BP: ()/(61-84)   Weight:  [83.2 kg (183 lb 6.8 oz)]   SpO2:  [89 %-100 %]   Daily Weight  10/21/24 : 83.2 kg (183 lb 6.8 oz)    Body mass index is 31.48 kg/m².    Physical Exam  Constitutional:       Comments: Awake, alert  HENT:      Head: Normocephalic and atraumatic.      Nose: Nose normal.      Mouth/Throat:      Mouth: Mucous membranes are moist.      Pharynx: Oropharynx is clear.   Eyes:      General: No scleral icterus.  Neck:      Comments: Right internal jugular catheter   Cardiovascular:      Rate and Rhythm: Normal rate and regular rhythm.   Pulmonary:      Effort: Pulmonary effort is normal.      Breath sounds: Normal breath sounds.   Abdominal:      General: Bowel sounds are normal.      Palpations: Abdomen is soft.   Musculoskeletal:         General: Normal range of motion.      Cervical back: Normal range of motion and neck supple.   Skin:     General: Skin is warm and dry.   Neurological:      Comments: Awake, alert   Psychiatric:         Mood and Affect: Mood normal.         Behavior: Behavior normal.     Antibiotics  mupirocin - 2 %  piperacillin-tazobactam - 3.375 gram/50 mL    Relevant Results  Labs  Results from last 72 hours   Lab Units 10/21/24  0424 10/20/24  0510 10/19/24  0414   WBC AUTO x10*3/uL 8.5 10.8 13.2*   HEMOGLOBIN g/dL 12.4 13.4 14.2   HEMATOCRIT % 36.8 39.9 42.1   PLATELETS AUTO x10*3/uL 160 181 223   NEUTROS PCT AUTO %  --   --  60.0   LYMPHS PCT AUTO %  --   --  31.1   MONOS PCT AUTO %  --   --  7.4   EOS PCT AUTO %  --   --  0.6     Results from last 72  "hours   Lab Units 10/21/24  0424 10/20/24  0510 10/19/24  0414   SODIUM mmol/L 142 140 138   POTASSIUM mmol/L 3.2* 3.2* 3.6   CHLORIDE mmol/L 109* 106 108*   CO2 mmol/L 26 24 23   BUN mg/dL 11 15 19   CREATININE mg/dL 1.05 0.93 1.06*   GLUCOSE mg/dL 94 84 96   CALCIUM mg/dL 9.0 9.1 9.2   ANION GAP mmol/L 10 13 11   EGFR mL/min/1.73m*2 54* 62 53*   PHOSPHORUS mg/dL 2.1* 2.3* 2.6     Results from last 72 hours   Lab Units 10/21/24  0424 10/20/24  0510 10/19/24  0414   ALBUMIN g/dL 3.2* 3.3* 3.3*     Estimated Creatinine Clearance: 44.6 mL/min (by C-G formula based on SCr of 1.05 mg/dL).  No results found for: \"CRP\"  Microbiology  Susceptibility data from last 14 days.  Collected Specimen Info Organism   10/18/24 Swab from Anterior Nares Methicillin Resistant Staphylococcus aureus (MRSA)     Imaging  Transthoracic Echo (TTE) Complete    Result Date: 10/19/2024            [place] [address]             Phone [phone] TRANSTHORACIC ECHOCARDIOGRAM REPORT Patient Name:      JOSEPHINE ARTIS        Reading Physician:    [id number] Nirmala Hammond MD Study Date:        10/18/2024            Ordering Provider:    [id number] BENEDICT GOMES MRN/PID:           [id number]              Fellow: Accession#:        [id number]          Nurse: Date of Birth/Age: 1945 / 79 years  Sonographer:          Nicolasa Hogan RDCS Gender:            F                     Additional Staff: Height:            162.56 cm             Admit Date: Weight:            83.01 kg              Admission Status:     Inpatient -                                                                Routine BSA / BMI:         1.88 m2 / 31.41 kg/m2 Department Location:  [facility] Blood Pressure: 109 /97 mmHg Study Type:    " TRANSTHORACIC ECHO (TTE) COMPLETE Diagnosis/ICD: Dilated cardiomyopathy-I42.0 Indication:    dilated cardiomyopathy CPT Codes:     Echo Complete w Full [id number] Patient History: Pertinent History: CHF, HTN, cardiomyopathy, arteriosclerosis of CABG, altered                    mental status. Study Detail: The following Echo studies were performed: 2D, M-Mode, Doppler and               color flow. Technically challenging study due to patient lying in               supine position and body habitus.  PHYSICIAN INTERPRETATION: Left Ventricle: The left ventricular systolic function is moderately decreased, with a visually estimated ejection fraction of 40%. Wall motion is abnormal. The left ventricular cavity size is normal. There is moderate concentric left ventricular hypertrophy. Spectral Doppler shows a normal pattern of left ventricular diastolic filling. Anteroapical wall severe Hypokinesis. Left Atrium: The left atrium is normal in size. Right Ventricle: The right ventricle is normal in size. There is normal right ventricular global systolic function. Right Atrium: The right atrium is normal in size. Aortic Valve: The aortic valve is trileaflet. The aortic valve dimensionless index is 0.67. There is no evidence of aortic valve regurgitation. The peak instantaneous gradient of the aortic valve is 3.2 mmHg. The mean gradient of the aortic valve is 2.0 mmHg. Mitral Valve: The mitral valve is normal in structure. There is no evidence of mitral valve regurgitation. Tricuspid Valve: The tricuspid valve is structurally normal. There is trace tricuspid regurgitation. The right ventricular systolic pressure is unable to be estimated. Pulmonic Valve: The pulmonic valve is structurally normal. There is physiologic pulmonic valve regurgitation. Pericardium: No pericardial effusion noted. Aorta: The aortic root is normal. Systemic Veins: The inferior vena cava was not well visualized.  CONCLUSIONS:  1. Poorly visualized  anatomical structures due to suboptimal image quality.  2. The left ventricular systolic function is moderately decreased, with a visually estimated ejection fraction of 40%.  3. Abnormal wall motion.  4. Anteroapical wall severe Hypokinesis.  5. There is moderate concentric left ventricular hypertrophy.  6. There is normal right ventricular global systolic function.  7. No evidence of mitral valve regurgitation.  8. Trace tricuspid regurgitation is visualized. QUANTITATIVE DATA SUMMARY:  2D MEASUREMENTS:            Normal Ranges: IVSd:            1.68 cm    (0.6-1.1cm) LVPWd:           1.49 cm    (0.6-1.1cm) LVIDd:           3.33 cm    (3.9-5.9cm) LVIDs:           2.63 cm LV Mass Index:   105.3 g/m2 LV % FS          21.0 %  LA VOLUME:                    Normal Ranges: LA Vol A4C:        33.6 ml    (22+/-6mL/m2) LA Vol A2C:        28.4 ml LA Vol BP:         31.4 ml LA Vol Index A4C:  17.8ml/m2 LA Vol Index A2C:  15.1 ml/m2 LA Vol Index BP:   16.7 ml/m2 LA Area A4C:       13.8 cm2 LA Area A2C:       12.5 cm2 LA Major Axis A4C: 4.8 cm LA Major Axis A2C: 4.7 cm LA Volume Index:   16.9 ml/m2 LA Vol A4C:        34.7 ml LA Vol A2C:        28.5 ml LA Vol Index BSA:  16.8 ml/m2  M-MODE MEASUREMENTS:         Normal Ranges: Ao Root:             2.70 cm (2.0-3.7cm) LAs:                 4.30 cm (2.7-4.0cm)  AORTA MEASUREMENTS:         Normal Ranges: Ao Sinus, d:        2.98 cm (2.1-3.5cm) Ao STJ, d:          2.74 cm (1.7-3.4cm) Asc Ao, d:          3.40 cm (2.1-3.4cm)  LV SYSTOLIC FUNCTION BY 2D PLANIMETRY (MOD):                      Normal Ranges: EF-A4C View:    40 % (>=55%) EF-A2C View:    43 % EF-Biplane:     42 % EF-Visual:      40 % LV EF Reported: 40 %  LV DIASTOLIC FUNCTION:           Normal Ranges: MV Peak E:             0.90 m/s  (0.7-1.2 m/s) MV e'                  0.047 m/s (>8.0) MV lateral e'          0.04 m/s MV medial e'           0.05 m/s E/e' Ratio:            19.12     (<8.0)  MITRAL VALVE:          Normal  Ranges: MV DT:        103 msec (150-240msec)  AORTIC VALVE:                     Normal Ranges: AoV Vmax:                0.90 m/s (<=1.7m/s) AoV Peak PG:             3.2 mmHg (<20mmHg) AoV Mean P.0 mmHg (1.7-11.5mmHg) LVOT Max Ravinder:            0.75 m/s (<=1.1m/s) AoV VTI:                 15.70 cm (18-25cm) LVOT VTI:                10.50 cm LVOT Diameter:           2.40 cm  (1.8-2.4cm) AoV Area, VTI:           3.03 cm2 (2.5-5.5cm2) AoV Area,Vmax:           3.77 cm2 (2.5-4.5cm2) AoV Dimensionless Index: 0.67  RIGHT VENTRICLE: RV s' 0.12 m/s  [id number] Nirmala Hammond MD Electronically signed on 10/19/2024 at 10:11:19 AM  ** Final **     Electrocardiogram, 12-lead ACS symptoms    Result Date: 10/18/2024  Normal sinus rhythm Nonspecific intraventricular block Possible Lateral infarct , age undetermined Abnormal ECG When compared with ECG of 18-DEC-2023 21:29, QRS axis Shifted left Inverted T waves have replaced nonspecific T wave abnormality in Anterior leads QT has lengthened    CT abdomen pelvis w IV contrast    Result Date: 10/17/2024  Interpreted By:  Finkelstein, Evan, STUDY: CT ABDOMEN PELVIS W IV CONTRAST;  10/17/2024 10:52 pm   INDICATION: Signs/Symptoms:concern for sepsis.     COMPARISON: None.   ACCESSION NUMBER(S): [id number]   ORDERING CLINICIAN: TEREZA REYES   TECHNIQUE: Axial CT images of the abdomen and pelvis with coronal and sagittal reconstructed images obtained after intravenous administration of 75 mL Omnipaque 350   FINDINGS: LOWER CHEST: Emphysematous changes in the visualized lung bases with bibasilar opacities.   ABDOMEN:   LIVER: Normal attenuation and contour. BILE DUCTS: Normal caliber. GALLBLADDER: The gallbladder is distended without wall thickening or pericholecystic fluid. No calcified gallstones are evident. PORTAL VEIN: Patent SPLEEN: Unremarkable. PANCREAS: Unremarkable. ADRENALS: Unremarkable. KIDNEYS, URETERS and URINARY BLADDER: Symmetric renal enhancement. No  hydronephrosis or perinephric fluid collection. Subcentimeter hypodensities in the kidneys are too small to characterize. A Carr catheter partially decompresses the bladder. There is associated wall thickening with mild adjacent stranding. Lucencies in the bladder likely related to instrumentation. REPRODUCTIVE ORGANS: Status post hysterectomy. Small amount of free pelvic fluid.   ABDOMINAL WALL: Within normal limits. PERITONEUM: No ascites, free air or fluid collection.   BOWEL: Gastric wall thickening. No small or large bowel dilatation. Scattered colonic diverticula. No pericolonic inflammatory stranding. Focal wall thickening of the cecum best seen on coronal image 39 of series 5.   VESSELS: No aortic aneurysm. Moderate aortoiliac calcifications. RETROPERITONEUM: No pathologically enlarged retroperitoneal lymph nodes.   BONES: No acute osseous abnormality.       A Carr catheter partially decompresses the urinary bladder, which limits evaluation. There is, however, associated wall thickening with mild adjacent stranding. Recommend correlation with urinalysis as findings may be seen with cystitis.   There is gallbladder distention, however, no calcified gallstones or wall thickening are evident.   Gastric wall thickening. Correlate for symptoms of gastritis.   Partially imaged bibasilar opacities favored to represent atelectasis. Developing infection is not excluded in the appropriate clinical setting.   Scattered colonic diverticulosis. No CT evidence of acute diverticulitis.   Focal wall thickening of the cecum best seen on coronal image 39 of series 5, with appearance suspicious for a colonic mass. Recommend nonemergent colonoscopy to further evaluate.   MACRO: None.   Signed by: Evan Finkelstein 10/17/2024 11:26 PM Dictation workstation:   [id number]    CT head wo IV contrast    Result Date: 10/17/2024  Interpreted By:  Finkelstein, Evan, STUDY: CT HEAD WO IV CONTRAST;  10/17/2024 10:59 pm   INDICATION:  Signs/Symptoms:AMS.     COMPARISON: CT brain 12/18/2023   ACCESSION NUMBER(S): [id number]   ORDERING CLINICIAN: TEREZA REYES   TECHNIQUE: Axial noncontrast CT images of the head with coronal and sagittal reconstructions.   FINDINGS: EXTRACRANIAL SOFT TISSUES: Unremarkable.   CALVARIUM: No depressed skull fracture. No destructive osseous lesion.   PARANASAL SINUSES/MASTOIDS: The visualized paranasal sinuses and mastoid air cells are aerated.   HEMORRHAGE: No acute intracranial hemorrhage.   BRAIN PARENCHYMA: Gray-white matter interfaces are preserved. No mass effect or midline shift. Aneurysmal coils at the skull base are again seen with adjacent streak artifact limiting evaluation. There are nonspecific scattered white matter hypodensities.   VENTRICLES and EXTRA-AXIAL SPACES: Parenchymal atrophy with prominence of the ventricles and cortical sulci.   OTHER FINDINGS: None.       No acute intracranial hemorrhage, mass effect or midline shift.   Nonspecific scattered white matter hypodensities favored to represent sequela of small vessel ischemia.       MACRO: None.   Signed by: Evan Finkelstein 10/17/2024 11:16 PM Dictation workstation:   [id number]    XR chest 1 view    Result Date: 10/17/2024  Interpreted By:  Ivana eLvy, STUDY: XR CHEST 1 VIEW;  10/17/2024 8:50 pm   INDICATION: Signs/Symptoms:central line placement, right IJ.   COMPARISON: 10/17/2024 at 7:35 p.m.   ACCESSION NUMBER(S): [id number]   ORDERING CLINICIAN: MELBA PARK   FINDINGS: Right IJ central venous catheter tip is at the level of the mid SVC.   CARDIOMEDIASTINAL SILHOUETTE: Cardiomediastinal silhouette is normal in size and configuration.   LUNGS: No pulmonary consolidation, pleural effusion or pneumothorax. Stable chronic parenchymal scarring/fibrotic changes.   ABDOMEN: No remarkable upper abdominal findings.   BONES: No acute osseous abnormality.       Right IJ central venous catheter tip is at the level of the mid SVC.   No  acute cardiopulmonary process.   MACRO: None   Signed by: Ivana Levy 10/17/2024 9:23 PM Dictation workstation:   [id number]    XR chest 1 view    Result Date: 10/17/2024  Interpreted By:  Dev Burch, STUDY: XR CHEST 1 VIEW;  10/17/2024 7:46 pm   INDICATION: Signs/Symptoms:AMS.     COMPARISON: 12/18/2023   ACCESSION NUMBER(S): [id number]   ORDERING CLINICIAN: TEREZA REYES   FINDINGS: PA and lateral radiographs of the chest were provided.       CARDIOMEDIASTINAL SILHOUETTE: Cardiomediastinal silhouette is normal in size and configuration.   LUNGS: Bibasilar atelectasis versus infiltrate.   ABDOMEN: No remarkable upper abdominal findings.   BONES: No acute osseous changes. There are sternotomy wires noted.       1.  Bibasilar atelectasis versus infiltrate.       MACRO: None   Signed by: Dev Burch 10/17/2024 7:55 PM Dictation workstation:   [id number]     Assessment/Plan   Septic shock, resolved  Toxic metabolic encephalopathy, resolved  Resolving acute kidney injury  Abnormal abdominal CT-gastroenterology on consult  Pyuria-negative urine culture  Abnormal chest imaging-atelectasis versus infiltrate  MRSA colonization of nares  History of recurrent urinary tract infection-positive cultures for Enterococcus        Continue Zosyn  Supportive care  Monitor temperature and WBC  Monitor renal function  Decolonize per protocol  Long-term plan is antibiotic till 10/25/2024-Augmentin is an oral option, followed by amoxicillin 500 mg p.o. daily long-term for secondary prophylaxis for recurrent urinary tract infection Dr. Amaya discussed with son and daughter-see discharge rec    Total time spent caring for the patient today was 20 minutes. This includes time spent before the visit reviewing the chart, time spent during the visit, and time spent after the visit on documentation.   Luci Obando, APRN-CNP

## 2024-10-21 NOTE — PROGRESS NOTES
10/21/24 0904   Discharge Planning   Home or Post Acute Services Post acute facilities (Rehab/SNF/etc)   Type of Post Acute Facility Services Long term care  (LTC/[facility].  Return referral processing.  Awaiting updates.)   Expected Discharge Disposition Inter   Does the patient need discharge transport arranged? Yes   RoundTrip coordination needed? Yes   Has discharge transport been arranged? No     [id number]  Per Carol, patient is LTC and a bed hold.  She does not need auth to return.  She can return when medically ready.

## 2024-10-21 NOTE — CARE PLAN
The patient's goals for the shift include      The clinical goals for the shift include pt safety and hemodynamic stability    Over the shift, the patient did not make progress toward the following goals. Barriers to progression include  Recommendations to address these barriers include   Problem: Heart Failure  Goal: Improved gas exchange this shift  Outcome: Progressing  Goal: Improved urinary output this shift  Outcome: Progressing  Goal: Reduction in peripheral edema within 24 hours  Outcome: Progressing  Goal: Report improvement of dyspnea/breathlessness this shift  Outcome: Progressing  Goal: Weight from fluid excess reduced over 2-3 days, then stabilize  Outcome: Progressing  Goal: Increase self care and/or family involvement in 24 hours  Outcome: Progressing     Problem: Skin  Goal: Decreased wound size/increased tissue granulation at next dressing change  Outcome: Progressing  Flowsheets (Taken 10/20/2024 0841 by Chrissy Lopez RN)  Decreased wound size/increased tissue granulation at next dressing change: Protective dressings over bony prominences  Goal: Participates in plan/prevention/treatment measures  Outcome: Progressing  Flowsheets (Taken 10/20/2024 2301)  Participates in plan/prevention/treatment measures: Elevate heels  Goal: Prevent/manage excess moisture  Outcome: Progressing  Flowsheets (Taken 10/20/2024 2301)  Prevent/manage excess moisture: Moisturize dry skin  Goal: Prevent/minimize sheer/friction injuries  Outcome: Progressing  Flowsheets (Taken 10/20/2024 2301)  Prevent/minimize sheer/friction injuries: HOB 30 degrees or less  Goal: Promote/optimize nutrition  Outcome: Progressing  Flowsheets (Taken 10/20/2024 2301)  Promote/optimize nutrition: Consume > 50% meals/supplements  Goal: Promote skin healing  Outcome: Progressing     Problem: Infection prevention/bleeding  Goal: Infection s/sx managed  Outcome: Progressing  Goal: No further progression of infection  Outcome: Progressing  Goal:  No signs of bleeding  Outcome: Progressing  Goal: Normal coagulation studies  Outcome: Progressing     Problem: Perfusion/Cardiac  Goal: Adequate perfusion to organs/extremities  Outcome: Progressing  Goal: Hemodynamically stable  Outcome: Progressing  Goal: No cardiac arrhythmias  Outcome: Progressing     Problem: Respiratory/Oxygenation  Goal: No signs of respiratory compromise  Outcome: Progressing  Goal: Tolerates activity without increased O2 demands  Outcome: Progressing     Problem: Neuro/Coping  Goal: Minimal anxiety; utilize coping mechanisms  Outcome: Progressing  Goal: No signs of neurological compromise  Outcome: Progressing  Goal: Increase self care/family involvement  Outcome: Progressing     Problem: Fluid/Electrolyte/Nutrition  Goal: Fluid balance within 1 liter of normovolemia  Outcome: Progressing  Goal: No signs of renal failure  Outcome: Progressing  Goal: Normal electrolyte levels  Outcome: Progressing  Goal: Normal glucose levels  Outcome: Progressing  Goal: Tolerates nutritional intake  Outcome: Progressing

## 2024-10-21 NOTE — PROGRESS NOTES
Speech-Language Pathology    Inpatient Speech Language Pathology Dysphagia Treatment Note     Patient Name: Mariella Cuello  MRN: [id number]  : 1945  Today's Date: 10/21/24  Time Calculation  Start Time: 1345  Stop Time: 1359  Time Calculation (min): 14 min       Total Number of Visits: 2/3 sw    PLAN:  Skilled dysphagia treatment continues to be warranted to provide training and instruction regarding the use of compensatory swallow strategies, to determine ability to upgrade diet after PO trials with SLP  Plan  Inpatient/Swing Bed or Outpatient: Inpatient  SLP TX Plan: Continue Plan of Care  SLP Plan: Skilled SLP  SLP Frequency: 3x per week  Duration: 3 weeks  SLP Discharge Recommendations: Continue skilled SLP services at the next level of care  Next Treatment Priority: diet carlos enrique and upgrade trials  Discussed POC: Patient, Caregiver/family  Discussed Risks/Benefits: Yes  Patient/Caregiver Agreeable: Yes    Recommendations:   Solid Diet Recommendations: Soft & bite sized/chopped (IDDSI Level 6)  Liquid Diet Recommendations: Thin (IDDSI Level 0)  Compensatory strategies:   - Upright positioning as close to 90º as possible  - Small bites/sips    Medication administration: Whole with liquid or puree    Subjective:  Pt was positioned upright in bed and was pleasant and cooperative. Pt's daughter present in room.  Pain:  Pain Assessment  Pain Assessment: 0-10  0-10 (Numeric) Pain Score: 0 - No pain         Oxygen Status:   room air      Objective:  Therapeutic Swallow Intervention : PO Trials, Caregiver Education  Solid Diet Recommendations: Soft & bite sized/chopped (IDDSI Level 6)  Liquid Diet Recommendations: Thin (IDDSI Level 0)       Goals:   -Pt to participate in reassessment of oropharyngeal swallow function via bedside evaluation to assess possible aspiration and to determine least restrictive diet for meeting pt's nutritional and hydration needs.               Goal initiated:  10/18/2024      Target date:  30 days        Baseline: N/A              Today's progress: Pt participates in diagnostic re-evaluation of oropharyngeal swallow function with tolerance demonstrated at the soft/bite sized and thin liquid level. Recommend advancement to PO diet.               Status: Goal MET     -Pt to demonstrate tolerance of PO trials at recommended diet level (soft/bite sized and thin liquids) without overt s/s of aspiration noted in 9/10 trials in order to maintain nutrition/hydration standards.   Goal initiated:  10/19/2024      Target date: 3 weeks       Baseline: Regular and thin at [facility]  Today's progress: Good tolerance of soft and bite-sized foods noted per nurse, with 100% intake at lunch.  Occasional cough reported by nurse; not suspected to be related to PO intake.  Pt demonstrated no s/s aspiration with trials of thin liquid via straw provided during session.              Status: Improving tolerance of solid foods  -Pt to participate in trials of upgraded diet textures without overt s/s of aspiration present in 9/10 opportunities in order to ensure least restrictive diet.   Goal initiated:  10/19/2024      Target date: 3 weeks       Baseline: Regular and thin at [facility]              Today's progress: Pt declined trials of advanced consistency due to being full from lunch.              Status: N/A    SLP Assessment:  Pt is demonstrating good nutritional intake and no swallowing problems with soft and bite-sized foods.  Pt interested in returning to baseline regular diet, but declines advanced trials this session.   Pt is progressing as a result of current treatment interventions and would benefit from continued skilled dysphagia treatment.    Treatment Outcome:  SLP TX Intervention Outcome: Making Progress Towards Goals    Treatment Tolerance: Patient tolerated treatment well   Prognosis: Good   Medical Staff Made Aware: Yes     Inpatient Education:   Individual(s) Educated: Patient  Verbal Education : Strategies, POC, diet  recommendations  Risk and Benefits Discussed with Patient/Caregiver/Other: yes  Patient/Caregiver Demonstrated Understanding: yes  Plan of Care Discussed and Agreed Upon: yes  Patient Response to Education: Patient/Caregiver Verbalized Understanding of Information

## 2024-10-21 NOTE — NURSING NOTE
Vascular access note    Dr. Olson has given ok to remove TLC, catheter removed intact, bruising noted around insertion site. Occlusive dressing of petroleum jelly on 2x2 applied, pressure held for 5 min, no evidence of swelling or bleeding noted. Large tegaderm applied. Patient aware to lay flat for 30 min and to call for nurse for any signs of wetness or warmth to site. Call bell in reach, family at bedside. RN aware of removal.

## 2024-10-21 NOTE — CARE PLAN
The patient's goals for the shift include      The clinical goals for the shift include pt safety and hemodynamic stability

## 2024-10-22 VITALS
HEIGHT: 64 IN | WEIGHT: 187.61 LBS | HEART RATE: 88 BPM | BODY MASS INDEX: 32.03 KG/M2 | DIASTOLIC BLOOD PRESSURE: 56 MMHG | TEMPERATURE: 97 F | SYSTOLIC BLOOD PRESSURE: 117 MMHG | OXYGEN SATURATION: 75 % | RESPIRATION RATE: 18 BRPM

## 2024-10-22 PROBLEM — R79.89 ELEVATED TROPONIN: Status: RESOLVED | Noted: 2024-10-18 | Resolved: 2024-10-22

## 2024-10-22 PROBLEM — A41.9 SEPSIS WITH ENCEPHALOPATHY AND SEPTIC SHOCK, DUE TO UNSPECIFIED ORGANISM (MULTI): Status: RESOLVED | Noted: 2024-10-18 | Resolved: 2024-10-22

## 2024-10-22 PROBLEM — R65.21 SEPSIS WITH ENCEPHALOPATHY AND SEPTIC SHOCK, DUE TO UNSPECIFIED ORGANISM (MULTI): Status: RESOLVED | Noted: 2024-10-18 | Resolved: 2024-10-22

## 2024-10-22 PROBLEM — G93.41 SEPSIS WITH ENCEPHALOPATHY AND SEPTIC SHOCK, DUE TO UNSPECIFIED ORGANISM (MULTI): Status: RESOLVED | Noted: 2024-10-18 | Resolved: 2024-10-22

## 2024-10-22 PROBLEM — N17.9 ACUTE KIDNEY INJURY (CMS-HCC): Status: RESOLVED | Noted: 2024-10-18 | Resolved: 2024-10-22

## 2024-10-22 PROBLEM — G93.41 METABOLIC ENCEPHALOPATHY: Status: RESOLVED | Noted: 2024-10-18 | Resolved: 2024-10-22

## 2024-10-22 LAB
ANION GAP SERPL CALCULATED.3IONS-SCNC: 10 MMOL/L (ref 10–20)
BACTERIA BLD CULT: NORMAL
BACTERIA BLD CULT: NORMAL
BUN SERPL-MCNC: 10 MG/DL (ref 6–23)
CALCIUM SERPL-MCNC: 9 MG/DL (ref 8.6–10.3)
CHLORIDE SERPL-SCNC: 110 MMOL/L (ref 98–107)
CO2 SERPL-SCNC: 22 MMOL/L (ref 21–32)
CREAT SERPL-MCNC: 0.96 MG/DL (ref 0.5–1.05)
EGFRCR SERPLBLD CKD-EPI 2021: 60 ML/MIN/1.73M*2
GLUCOSE BLD MANUAL STRIP-MCNC: 100 MG/DL (ref 74–99)
GLUCOSE BLD MANUAL STRIP-MCNC: 103 MG/DL (ref 74–99)
GLUCOSE BLD MANUAL STRIP-MCNC: 104 MG/DL (ref 74–99)
GLUCOSE BLD MANUAL STRIP-MCNC: 107 MG/DL (ref 74–99)
GLUCOSE SERPL-MCNC: 93 MG/DL (ref 74–99)
POTASSIUM SERPL-SCNC: 3.6 MMOL/L (ref 3.5–5.3)
SODIUM SERPL-SCNC: 138 MMOL/L (ref 136–145)

## 2024-10-22 PROCEDURE — 80048 BASIC METABOLIC PNL TOTAL CA: CPT | Performed by: INTERNAL MEDICINE

## 2024-10-22 PROCEDURE — 82947 ASSAY GLUCOSE BLOOD QUANT: CPT

## 2024-10-22 PROCEDURE — 97116 GAIT TRAINING THERAPY: CPT | Mod: GP

## 2024-10-22 PROCEDURE — 97110 THERAPEUTIC EXERCISES: CPT | Mod: GP

## 2024-10-22 PROCEDURE — 92526 ORAL FUNCTION THERAPY: CPT | Mod: GN | Performed by: SPEECH-LANGUAGE PATHOLOGIST

## 2024-10-22 PROCEDURE — 2500000004 HC RX 250 GENERAL PHARMACY W/ HCPCS (ALT 636 FOR OP/ED): Performed by: STUDENT IN AN ORGANIZED HEALTH CARE EDUCATION/TRAINING PROGRAM

## 2024-10-22 PROCEDURE — 2500000001 HC RX 250 WO HCPCS SELF ADMINISTERED DRUGS (ALT 637 FOR MEDICARE OP): Performed by: INTERNAL MEDICINE

## 2024-10-22 PROCEDURE — 99239 HOSP IP/OBS DSCHRG MGMT >30: CPT | Performed by: INTERNAL MEDICINE

## 2024-10-22 PROCEDURE — 2500000002 HC RX 250 W HCPCS SELF ADMINISTERED DRUGS (ALT 637 FOR MEDICARE OP, ALT 636 FOR OP/ED): Performed by: STUDENT IN AN ORGANIZED HEALTH CARE EDUCATION/TRAINING PROGRAM

## 2024-10-22 PROCEDURE — 2500000001 HC RX 250 WO HCPCS SELF ADMINISTERED DRUGS (ALT 637 FOR MEDICARE OP): Performed by: STUDENT IN AN ORGANIZED HEALTH CARE EDUCATION/TRAINING PROGRAM

## 2024-10-22 RX ORDER — PANTOPRAZOLE SODIUM 40 MG/1
40 TABLET, DELAYED RELEASE ORAL
Start: 2024-10-23

## 2024-10-22 RX ORDER — POTASSIUM CHLORIDE 750 MG/1
20 TABLET, FILM COATED, EXTENDED RELEASE ORAL DAILY
COMMUNITY

## 2024-10-22 RX ORDER — LAMOTRIGINE 25 MG/1
25 TABLET ORAL DAILY
COMMUNITY

## 2024-10-22 RX ORDER — CRANBERRY FRUIT 450 MG
1 TABLET ORAL DAILY
COMMUNITY

## 2024-10-22 RX ORDER — LAMOTRIGINE 100 MG/1
25 TABLET ORAL 2 TIMES DAILY
Status: CANCELLED
Start: 2024-10-22

## 2024-10-22 RX ORDER — METOPROLOL SUCCINATE 25 MG/1
25 TABLET, EXTENDED RELEASE ORAL DAILY
Start: 2024-10-23

## 2024-10-22 ASSESSMENT — COGNITIVE AND FUNCTIONAL STATUS - GENERAL
PERSONAL GROOMING: A LITTLE
TOILETING: TOTAL
MOVING TO AND FROM BED TO CHAIR: A LOT
HELP NEEDED FOR BATHING: A LOT
STANDING UP FROM CHAIR USING ARMS: A LITTLE
CLIMB 3 TO 5 STEPS WITH RAILING: A LOT
WALKING IN HOSPITAL ROOM: A LOT
MOBILITY SCORE: 12
TURNING FROM BACK TO SIDE WHILE IN FLAT BAD: A LOT
DRESSING REGULAR LOWER BODY CLOTHING: TOTAL
MOVING TO AND FROM BED TO CHAIR: A LITTLE
MOVING FROM LYING ON BACK TO SITTING ON SIDE OF FLAT BED WITH BEDRAILS: A LOT
MOVING FROM LYING ON BACK TO SITTING ON SIDE OF FLAT BED WITH BEDRAILS: A LITTLE
DAILY ACTIVITIY SCORE: 12
CLIMB 3 TO 5 STEPS WITH RAILING: TOTAL
MOBILITY SCORE: 16
STANDING UP FROM CHAIR USING ARMS: A LITTLE
WALKING IN HOSPITAL ROOM: A LITTLE
EATING MEALS: A LITTLE
DRESSING REGULAR UPPER BODY CLOTHING: A LOT
TURNING FROM BACK TO SIDE WHILE IN FLAT BAD: A LOT

## 2024-10-22 ASSESSMENT — PAIN - FUNCTIONAL ASSESSMENT
PAIN_FUNCTIONAL_ASSESSMENT: 0-10

## 2024-10-22 ASSESSMENT — PAIN SCALES - GENERAL
PAINLEVEL_OUTOF10: 0 - NO PAIN

## 2024-10-22 NOTE — PROGRESS NOTES
10/22/24 0907   Discharge Planning   Home or Post Acute Services Post acute facilities (Rehab/SNF/etc)   Type of Post Acute Facility Services Long term care  (LTC [facility] [place] and a bed hold.  She does not need auth to return.  She can return when medically ready.)   Expected Discharge Disposition Inter   Does the patient need discharge transport arranged? Yes   RoundTrip coordination needed? Yes   Has discharge transport been arranged? No     1400  Transportation arranged for 1530 via [employer].  Family at bedside and aware.  Reviewed questions regarding medications with patient and family.  Patient's clothes are missing.  Family checking with other family members to see if they were taken by family.

## 2024-10-22 NOTE — PROGRESS NOTES
Inpatient Speech Language Pathology Dysphagia Treatment note     Patient Name: Mariella Cuello  MRN: [id number]  : 1945  Today's Date: 10/22/24  Time Calculation  Start Time: 1515  Stop Time: 1530  Time Calculation (min): 15 min       Total Number of Visits: 3/3 (, FRI)    PLAN:  Skilled speech therapy for dysphagia treatment continues to be warranted to assess tolerance of diet   SLP TX Plan: Discharge from Speech Therapy  SLP Frequency: 3x per week  Discussed POC: Patient, Caregiver/family  Discussed Risks/Benefits: Yes, Patient, Caregiver/Family  Patient/Caregiver Agreeable: Yes  SLP - OK to Discharge: Yes    Recommended Diet:   Solid Diet Recommendations: Regular (IDDSI Level 7)  Liquid Diet Recommendations: Thin (IDDSI Level 0)  Compensatory strategies: upright 90 degrees for intake , slow rate of intake  Medication administration: Whole with liquids    Subjective:  Pt. Seen at bedside for skilled dysphagia treatment. Pt was alert and participative. Her family was in the room upon SLP arrival.  Pain:  Pain Assessment  Pain Assessment: 0-10  0-10 (Numeric) Pain Score: 0 - No pain         Oxygen Status:   room air             Goals:   -Pt to participate in reassessment of oropharyngeal swallow function via bedside evaluation to assess possible aspiration and to determine least restrictive diet for meeting pt's nutritional and hydration needs.               Goal initiated:  10/18/2024      Target date: 30 days        Baseline: N/A              Today's progress: Pt participates in diagnostic re-evaluation of oropharyngeal swallow function with tolerance demonstrated at the soft/bite sized and thin liquid level. Recommend advancement to PO diet.               Status: Goal MET     -Pt to demonstrate tolerance of PO trials at recommended diet level (soft/bite sized and thin liquids) without overt s/s of aspiration noted in 9/10 trials in order to maintain nutrition/hydration standards.   Goal initiated:   10/19/2024      Target date: 3 weeks       Baseline: Regular and thin at [facility]  Today's progress: Pt tolerated sip of thin liquid without coughing/choking. Pt Sister in Law reported that pt tolerated last night's dinner and this morning's breakfast without coughing or choking as well.               Status: Goal Met  -Pt to participate in trials of upgraded diet textures without overt s/s of aspiration present in 9/10 opportunities in order to ensure least restrictive diet.   Goal initiated:  10/19/2024      Target date: 3 weeks       Baseline: Regular and thin at [facility]              Today's progress: Pt participated in trial of solids (I.e bobby cracker) and showed no s/s of aspiration (coughing/choking). Her mastication timing has decreased since last session, with improvement noted in overall mental status, which supports an upgraded diet texture to Solids (IDDSI Level 7) from Soft-bite sized foods (IDDSI Level 6).               Status: Goal Met      SLP Assessment:  Pt demonstrated adequate PO intake during mastication of a bobby cracker evident by her decreased mastication timing and fatigue while eating. Overall, pt presented better than previous session in regard to her alertness and participation. Pt has met goal of tolerating modified diet and qualifies for upgraded diet to Solids (IDDSI Level 7) and thin liquids (IDDSI Level 0).      Treatment Outcome:  SLP TX Intervention Outcome: Making Progress Towards Goals    Treatment Tolerance: Patient tolerated treatment well   Prognosis: Good      Medical Staff Made Aware: Yes       Education:  Pt. Given skilled instruction on safe swallowing strategies including upright postioning and slow rate of intake.   Pt. gave verbal understanding        Disclaimer: This was completed in collaboration with supervising therapist, Corinne Casey M.A. CCC-SLP

## 2024-10-22 NOTE — CARE PLAN
The patient's goals for the shift include      The clinical goals for the shift include pt safety and hemodynamically stable    Over the shift, the patient did not make progress toward the following goals. Barriers to progression include . Recommendations to address these barriers include   Problem: Heart Failure  Goal: Improved gas exchange this shift  Outcome: Progressing  Goal: Improved urinary output this shift  Outcome: Progressing  Goal: Reduction in peripheral edema within 24 hours  Outcome: Progressing  Goal: Report improvement of dyspnea/breathlessness this shift  Outcome: Progressing  Goal: Weight from fluid excess reduced over 2-3 days, then stabilize  Outcome: Progressing  Goal: Increase self care and/or family involvement in 24 hours  Outcome: Progressing     Problem: Skin  Goal: Decreased wound size/increased tissue granulation at next dressing change  Outcome: Progressing  Flowsheets (Taken 10/22/2024 0128)  Decreased wound size/increased tissue granulation at next dressing change: Protective dressings over bony prominences  Goal: Participates in plan/prevention/treatment measures  Outcome: Progressing  Flowsheets (Taken 10/22/2024 0128)  Participates in plan/prevention/treatment measures: Discuss with provider PT/OT consult  Goal: Prevent/manage excess moisture  Outcome: Progressing  Flowsheets (Taken 10/22/2024 0128)  Prevent/manage excess moisture:   Cleanse incontinence/protect with barrier cream   Moisturize dry skin  Goal: Prevent/minimize sheer/friction injuries  Outcome: Progressing  Flowsheets (Taken 10/22/2024 0128)  Prevent/minimize sheer/friction injuries: HOB 30 degrees or less  Goal: Promote/optimize nutrition  Outcome: Progressing  Flowsheets (Taken 10/22/2024 0128)  Promote/optimize nutrition: Consume > 50% meals/supplements  Goal: Promote skin healing  Outcome: Progressing     Problem: Infection prevention/bleeding  Goal: Infection s/sx managed  Outcome: Progressing  Goal: No further  progression of infection  Outcome: Progressing  Goal: No signs of bleeding  Outcome: Progressing  Goal: Normal coagulation studies  Outcome: Progressing     Problem: Perfusion/Cardiac  Goal: Adequate perfusion to organs/extremities  Outcome: Progressing  Goal: Hemodynamically stable  Outcome: Progressing  Goal: No cardiac arrhythmias  Outcome: Progressing     Problem: Respiratory/Oxygenation  Goal: No signs of respiratory compromise  Outcome: Progressing  Goal: Tolerates activity without increased O2 demands  Outcome: Progressing     Problem: Neuro/Coping  Goal: Minimal anxiety; utilize coping mechanisms  Outcome: Progressing  Goal: No signs of neurological compromise  Outcome: Progressing  Goal: Increase self care/family involvement  Outcome: Progressing     Problem: Fluid/Electrolyte/Nutrition  Goal: Fluid balance within 1 liter of normovolemia  Outcome: Progressing  Goal: No signs of renal failure  Outcome: Progressing  Goal: Normal electrolyte levels  Outcome: Progressing  Goal: Normal glucose levels  Outcome: Progressing  Goal: Tolerates nutritional intake  Outcome: Progressing     Problem: Nutrition  Goal: Oral intake greater 75%  Outcome: Progressing   .

## 2024-10-22 NOTE — PROGRESS NOTES
Physical Therapy    Physical Therapy Treatment    Patient Name: Mariella Cuello  MRN: [id number]  Department: [facility] 4 ICU  Room: [address] [place] [address]  Today's Date: 10/22/2024  Time Calculation  Start Time: 1252  Stop Time: 1315  Time Calculation (min): 23 min         Assessment/Plan   PT Assessment  PT Assessment Results: Decreased strength, Decreased endurance, Decreased mobility, Impaired balance, Decreased cognition, Impaired judgement, Decreased safety awareness  Rehab Prognosis: Fair  Barriers to Discharge: LTC resident  Evaluation/Treatment Tolerance: Patient limited by fatigue  Medical Staff Made Aware: Yes  End of Session Communication: Bedside nurse  End of Session Patient Position: Up in chair, Alarm on    Assessment Comment: Pt with improved OOB tolerance and able to ambulate about 8 ft with wheeled walker. Encouragement provided to promote increased activity. Remains an increased falls risk and requires assist x 1 for safe mobility and transfers .       PT Plan  Treatment/Interventions: Bed mobility, Transfer training, Gait training, Balance training, Neuromuscular re-education, Strengthening, Endurance training, Therapeutic exercise, Therapeutic activity  PT Plan: Ongoing PT  PT Frequency: 3 times per week  PT Discharge Recommendations: Other (comment) (return to LTC facility with PT involvement)  Equipment Recommended upon Discharge: Wheeled walker  PT Recommended Transfer Status: Assist x1, Assistive device  PT - OK to Discharge: Yes      General Visit Information:   PT  Visit  PT Received On: 10/22/24  General  Reason for Referral: impaired mobility; AMS  Past Medical History Relevant to Rehab: including but not limited to alzheimier's, anemia, depression, HTN, hypothyroidism  Family/Caregiver Present: No  Prior to Session Communication: Bedside nurse  Patient Position Received: Bed, 3 rail up, Alarm on  Preferred Learning Style: verbal, visual  General Comment: Cleared for mobility per nursing. Care transitions  requesting updated notes prior to pt dc. Pt supine in bed upon arrival, agreeable to participate.    Subjective   Precautions:  Precautions  Medical Precautions: Fall precautions      Objective   Pain:  Pain Assessment  Pain Assessment: 0-10  0-10 (Numeric) Pain Score: 0 - No pain  Cognition:  Cognition  Cognition Comments: able to follow simple commands, with increased time and cueing. Delayed processing. Slow movements and to perform tasks. Flat affect. minimal interaction  Processing Speed: Delayed    Postural Control:  Postural Control  Posture Comment: flexed posture, rounded shoulders  Extremity/Trunk Assessments:    Activity Tolerance:  Activity Tolerance  Endurance: Decreased tolerance for upright activites  Treatments:  Therapeutic Exercise  Therapeutic Exercise Performed: Yes  Therapeutic Exercise Activity 1: x15 B LAQs, B seated marches     Bed Mobility 1  Bed Mobility 1: Supine to sitting  Level of Assistance 1: Moderate assistance  Bed Mobility Comments 1: HOB elevated    Ambulation/Gait Training  Ambulation/Gait Training Performed: Yes  Ambulation/Gait Training 1  Surface 1: Level tile  Device 1: Rolling walker  Gait Support Devices: Wheelchair follow  Assistance 1: Moderate assistance  Quality of Gait 1: Narrow base of support, Inconsistent stride length, Decreased step length, Shuffling gait, Forward flexed posture, Soft knee(s)  Comments/Distance (ft) 1: about 7-8 ft fwd with close chair follow. Walker manipulation provided by therapist. Cueing to increase step heigth and length with each step. observed SOB;  cueing for pursed-lip breathing.      Transfer 1  Technique 1: Sit to stand, Stand to sit  Transfer Device 1: Walker  Transfer Level of Assistance 1: Minimum assistance  Trials/Comments 1: elevated EOB  Transfers 2  Transfer From 2: Bed to  Transfer to 2: Chair with arms  Technique 2: Sit to stand, Stand to sit  Transfer Device 2: Walker  Transfer Level of Assistance 2: Minimum assistance,  +2  Trials/Comments 2: shufflelike pattern about 1 ft from bed to chair. Walker manipulation provided for turning.  Transfers 3  Technique 3: Sit to stand, Stand to sit  Transfer Device 3: Walker  Transfer Level of Assistance 3: Minimum assistance, +2  Trials/Comments 3: x 2 trials from chair with arms        Outcome Measures:  [facility] Basic Mobility  Turning from your back to your side while in a flat bed without using bedrails: A lot  Moving from lying on your back to sitting on the side of a flat bed without using bedrails: A lot  Moving to and from bed to chair (including a wheelchair): A lot  Standing up from a chair using your arms (e.g. wheelchair or bedside chair): A little  To walk in hospital room: A lot  Climbing 3-5 steps with railing: Total  Basic Mobility - Total Score: 12    Education Documentation  Precautions, taught by Christina Dickson PT at 10/22/2024  1:30 PM.  Learner: Patient  Readiness: Acceptance  Method: Explanation  Response: Needs Reinforcement    Body Mechanics, taught by Christina Dickson PT at 10/22/2024  1:30 PM.  Learner: Patient  Readiness: Acceptance  Method: Explanation  Response: Needs Reinforcement    Mobility Training, taught by Christina Dickson PT at 10/22/2024  1:30 PM.  Learner: Patient  Readiness: Acceptance  Method: Explanation  Response: Needs Reinforcement    Education Comments  No comments found.        OP EDUCATION:       Encounter Problems       Encounter Problems (Active)       Balance       sitting (Progressing)       Start:  10/19/24    Expected End:  11/02/24       Pt will sit on EOB with supervision and no LOB, during activities         standing (Progressing)       Start:  10/19/24    Expected End:  11/02/24       Pt will stand with UE support of walker and CGA            Mobility       bed mobility (Progressing)       Start:  10/19/24    Expected End:  11/02/24       Pt will perform sup to/from sit transfer with supervision             PT Transfers       sit to stand (Progressing)        Start:  10/19/24    Expected End:  11/02/24       Pt will perform sit to stand transfer with walker and CGA         bed to chair (Progressing)       Start:  10/19/24    Expected End:  11/02/24       Pt will perform bed to chair transfer with walker and CGA            Safety       LTG - Patient will utilize safety techniques (Progressing)       Start:  10/19/24    Expected End:  11/02/24

## 2024-10-22 NOTE — PROGRESS NOTES
Mariella Cuello is a 79 y.o. female on day 4 of admission presenting with Sepsis with encephalopathy and septic shock, due to unspecified organism (Multi).    Subjective   Interval History:   Feeling better  Afebrile, no chills  Awake, responsive  Remains on room air  No nausea, vomiting or diarrhea   No new complaints     Objective   Range of Vitals (last 24 hours)  Heart Rate:  [83-89]   Temp:  [36.1 °C (97 °F)-37 °C (98.6 °F)]   Resp:  [18-23]   BP: ()/(38-92)   Weight:  [83.2 kg (183 lb 6.8 oz)-85.1 kg (187 lb 9.8 oz)]   SpO2:  [75 %-93 %]   Daily Weight  10/22/24 : 85.1 kg (187 lb 9.8 oz)    Body mass index is 32.2 kg/m².    Physical Exam  Constitutional:       Comments: Awake, alert  HENT:      Head: Normocephalic and atraumatic.      Nose: Nose normal.      Mouth/Throat:      Mouth: Mucous membranes are moist.      Pharynx: Oropharynx is clear.   Eyes:      General: No scleral icterus.  Neck:     supple  Cardiovascular:      Rate and Rhythm: Normal rate and regular rhythm.   Pulmonary:      Effort: Pulmonary effort is normal.      Breath sounds: Normal breath sounds.   Abdominal:      General: Bowel sounds are normal.      Palpations: Abdomen is soft.   Musculoskeletal:         General: Normal range of motion.      Cervical back: Normal range of motion and neck supple.   Skin:     General: Skin is warm and dry.   Neurological:      Comments: Awake, alert   Psychiatric:         Mood and Affect: Mood normal.         Behavior: Behavior normal.     Antibiotics  amoxicillin - 500 mg  amoxicillin-pot clavulanate - 875-125 mg  mupirocin - 2 %  piperacillin-tazobactam - 3.375 gram/50 mL    Relevant Results  Labs  Results from last 72 hours   Lab Units 10/21/24  0424 10/20/24  0510   WBC AUTO x10*3/uL 8.5 10.8   HEMOGLOBIN g/dL 12.4 13.4   HEMATOCRIT % 36.8 39.9   PLATELETS AUTO x10*3/uL 160 181     Results from last 72 hours   Lab Units 10/22/24  0748 10/21/24  0424 10/20/24  0510   SODIUM mmol/L 138 142 140  "  POTASSIUM mmol/L 3.6 3.2* 3.2*   CHLORIDE mmol/L 110* 109* 106   CO2 mmol/L 22 26 24   BUN mg/dL 10 11 15   CREATININE mg/dL 0.96 1.05 0.93   GLUCOSE mg/dL 93 94 84   CALCIUM mg/dL 9.0 9.0 9.1   ANION GAP mmol/L 10 10 13   EGFR mL/min/1.73m*2 60* 54* 62   PHOSPHORUS mg/dL  --  2.1* 2.3*     Results from last 72 hours   Lab Units 10/21/24  0424 10/20/24  0510   ALBUMIN g/dL 3.2* 3.3*     Estimated Creatinine Clearance: 49.4 mL/min (by C-G formula based on SCr of 0.96 mg/dL).  No results found for: \"CRP\"  Microbiology  Susceptibility data from last 14 days.  Collected Specimen Info Organism   10/18/24 Swab from Anterior Nares Methicillin Resistant Staphylococcus aureus (MRSA)     Imaging  Transthoracic Echo (TTE) Complete    Result Date: 10/19/2024            [place] [address]             Phone [phone] TRANSTHORACIC ECHOCARDIOGRAM REPORT Patient Name:      JOSEPHINE ARTIS        Reading Physician:    [id number] Nirmala Hammond MD Study Date:        10/18/2024            Ordering Provider:    [id number] BENEDICT GOMES MRN/PID:           [id number]              Fellow: Accession#:        [id number]          Nurse: Date of Birth/Age: 1945 / 79 years  Sonographer:          Nicolasa Hogan RDCS Gender:            F                     Additional Staff: Height:            162.56 cm             Admit Date: Weight:            83.01 kg              Admission Status:     Inpatient -                                                                Routine BSA / BMI:         1.88 m2 / 31.41 kg/m2 Department Location:  [facility] Blood Pressure: 109 /97 mmHg Study Type:    TRANSTHORACIC ECHO (TTE) COMPLETE Diagnosis/ICD: Dilated cardiomyopathy-I42.0 Indication:    dilated cardiomyopathy CPT " Codes:     Echo Complete w Full [id number] Patient History: Pertinent History: CHF, HTN, cardiomyopathy, arteriosclerosis of CABG, altered                    mental status. Study Detail: The following Echo studies were performed: 2D, M-Mode, Doppler and               color flow. Technically challenging study due to patient lying in               supine position and body habitus.  PHYSICIAN INTERPRETATION: Left Ventricle: The left ventricular systolic function is moderately decreased, with a visually estimated ejection fraction of 40%. Wall motion is abnormal. The left ventricular cavity size is normal. There is moderate concentric left ventricular hypertrophy. Spectral Doppler shows a normal pattern of left ventricular diastolic filling. Anteroapical wall severe Hypokinesis. Left Atrium: The left atrium is normal in size. Right Ventricle: The right ventricle is normal in size. There is normal right ventricular global systolic function. Right Atrium: The right atrium is normal in size. Aortic Valve: The aortic valve is trileaflet. The aortic valve dimensionless index is 0.67. There is no evidence of aortic valve regurgitation. The peak instantaneous gradient of the aortic valve is 3.2 mmHg. The mean gradient of the aortic valve is 2.0 mmHg. Mitral Valve: The mitral valve is normal in structure. There is no evidence of mitral valve regurgitation. Tricuspid Valve: The tricuspid valve is structurally normal. There is trace tricuspid regurgitation. The right ventricular systolic pressure is unable to be estimated. Pulmonic Valve: The pulmonic valve is structurally normal. There is physiologic pulmonic valve regurgitation. Pericardium: No pericardial effusion noted. Aorta: The aortic root is normal. Systemic Veins: The inferior vena cava was not well visualized.  CONCLUSIONS:  1. Poorly visualized anatomical structures due to suboptimal image quality.  2. The left ventricular systolic function is moderately decreased,  with a visually estimated ejection fraction of 40%.  3. Abnormal wall motion.  4. Anteroapical wall severe Hypokinesis.  5. There is moderate concentric left ventricular hypertrophy.  6. There is normal right ventricular global systolic function.  7. No evidence of mitral valve regurgitation.  8. Trace tricuspid regurgitation is visualized. QUANTITATIVE DATA SUMMARY:  2D MEASUREMENTS:            Normal Ranges: IVSd:            1.68 cm    (0.6-1.1cm) LVPWd:           1.49 cm    (0.6-1.1cm) LVIDd:           3.33 cm    (3.9-5.9cm) LVIDs:           2.63 cm LV Mass Index:   105.3 g/m2 LV % FS          21.0 %  LA VOLUME:                    Normal Ranges: LA Vol A4C:        33.6 ml    (22+/-6mL/m2) LA Vol A2C:        28.4 ml LA Vol BP:         31.4 ml LA Vol Index A4C:  17.8ml/m2 LA Vol Index A2C:  15.1 ml/m2 LA Vol Index BP:   16.7 ml/m2 LA Area A4C:       13.8 cm2 LA Area A2C:       12.5 cm2 LA Major Axis A4C: 4.8 cm LA Major Axis A2C: 4.7 cm LA Volume Index:   16.9 ml/m2 LA Vol A4C:        34.7 ml LA Vol A2C:        28.5 ml LA Vol Index BSA:  16.8 ml/m2  M-MODE MEASUREMENTS:         Normal Ranges: Ao Root:             2.70 cm (2.0-3.7cm) LAs:                 4.30 cm (2.7-4.0cm)  AORTA MEASUREMENTS:         Normal Ranges: Ao Sinus, d:        2.98 cm (2.1-3.5cm) Ao STJ, d:          2.74 cm (1.7-3.4cm) Asc Ao, d:          3.40 cm (2.1-3.4cm)  LV SYSTOLIC FUNCTION BY 2D PLANIMETRY (MOD):                      Normal Ranges: EF-A4C View:    40 % (>=55%) EF-A2C View:    43 % EF-Biplane:     42 % EF-Visual:      40 % LV EF Reported: 40 %  LV DIASTOLIC FUNCTION:           Normal Ranges: MV Peak E:             0.90 m/s  (0.7-1.2 m/s) MV e'                  0.047 m/s (>8.0) MV lateral e'          0.04 m/s MV medial e'           0.05 m/s E/e' Ratio:            19.12     (<8.0)  MITRAL VALVE:          Normal Ranges: MV DT:        103 msec (150-240msec)  AORTIC VALVE:                     Normal Ranges: AoV Vmax:                0.90  m/s (<=1.7m/s) AoV Peak PG:             3.2 mmHg (<20mmHg) AoV Mean P.0 mmHg (1.7-11.5mmHg) LVOT Max Ravinder:            0.75 m/s (<=1.1m/s) AoV VTI:                 15.70 cm (18-25cm) LVOT VTI:                10.50 cm LVOT Diameter:           2.40 cm  (1.8-2.4cm) AoV Area, VTI:           3.03 cm2 (2.5-5.5cm2) AoV Area,Vmax:           3.77 cm2 (2.5-4.5cm2) AoV Dimensionless Index: 0.67  RIGHT VENTRICLE: RV s' 0.12 m/s  [id number] Nirmala Hammond MD Electronically signed on 10/19/2024 at 10:11:19 AM  ** Final **     Electrocardiogram, 12-lead ACS symptoms    Result Date: 10/18/2024  Normal sinus rhythm Nonspecific intraventricular block Possible Lateral infarct , age undetermined Abnormal ECG When compared with ECG of 18-DEC-2023 21:29, QRS axis Shifted left Inverted T waves have replaced nonspecific T wave abnormality in Anterior leads QT has lengthened    CT abdomen pelvis w IV contrast    Result Date: 10/17/2024  Interpreted By:  Finkelstein, Evan, STUDY: CT ABDOMEN PELVIS W IV CONTRAST;  10/17/2024 10:52 pm   INDICATION: Signs/Symptoms:concern for sepsis.     COMPARISON: None.   ACCESSION NUMBER(S): [id number]   ORDERING CLINICIAN: TEREZA REYES   TECHNIQUE: Axial CT images of the abdomen and pelvis with coronal and sagittal reconstructed images obtained after intravenous administration of 75 mL Omnipaque 350   FINDINGS: LOWER CHEST: Emphysematous changes in the visualized lung bases with bibasilar opacities.   ABDOMEN:   LIVER: Normal attenuation and contour. BILE DUCTS: Normal caliber. GALLBLADDER: The gallbladder is distended without wall thickening or pericholecystic fluid. No calcified gallstones are evident. PORTAL VEIN: Patent SPLEEN: Unremarkable. PANCREAS: Unremarkable. ADRENALS: Unremarkable. KIDNEYS, URETERS and URINARY BLADDER: Symmetric renal enhancement. No hydronephrosis or perinephric fluid collection. Subcentimeter hypodensities in the kidneys are too small to characterize. A Carr  catheter partially decompresses the bladder. There is associated wall thickening with mild adjacent stranding. Lucencies in the bladder likely related to instrumentation. REPRODUCTIVE ORGANS: Status post hysterectomy. Small amount of free pelvic fluid.   ABDOMINAL WALL: Within normal limits. PERITONEUM: No ascites, free air or fluid collection.   BOWEL: Gastric wall thickening. No small or large bowel dilatation. Scattered colonic diverticula. No pericolonic inflammatory stranding. Focal wall thickening of the cecum best seen on coronal image 39 of series 5.   VESSELS: No aortic aneurysm. Moderate aortoiliac calcifications. RETROPERITONEUM: No pathologically enlarged retroperitoneal lymph nodes.   BONES: No acute osseous abnormality.       A Carr catheter partially decompresses the urinary bladder, which limits evaluation. There is, however, associated wall thickening with mild adjacent stranding. Recommend correlation with urinalysis as findings may be seen with cystitis.   There is gallbladder distention, however, no calcified gallstones or wall thickening are evident.   Gastric wall thickening. Correlate for symptoms of gastritis.   Partially imaged bibasilar opacities favored to represent atelectasis. Developing infection is not excluded in the appropriate clinical setting.   Scattered colonic diverticulosis. No CT evidence of acute diverticulitis.   Focal wall thickening of the cecum best seen on coronal image 39 of series 5, with appearance suspicious for a colonic mass. Recommend nonemergent colonoscopy to further evaluate.   MACRO: None.   Signed by: Evan Finkelstein 10/17/2024 11:26 PM Dictation workstation:   [id number]    CT head wo IV contrast    Result Date: 10/17/2024  Interpreted By:  Finkelstein, Evan, STUDY: CT HEAD WO IV CONTRAST;  10/17/2024 10:59 pm   INDICATION: Signs/Symptoms:AMS.     COMPARISON: CT brain 12/18/2023   ACCESSION NUMBER(S): [id number]   ORDERING CLINICIAN: TEREZA REYES    TECHNIQUE: Axial noncontrast CT images of the head with coronal and sagittal reconstructions.   FINDINGS: EXTRACRANIAL SOFT TISSUES: Unremarkable.   CALVARIUM: No depressed skull fracture. No destructive osseous lesion.   PARANASAL SINUSES/MASTOIDS: The visualized paranasal sinuses and mastoid air cells are aerated.   HEMORRHAGE: No acute intracranial hemorrhage.   BRAIN PARENCHYMA: Gray-white matter interfaces are preserved. No mass effect or midline shift. Aneurysmal coils at the skull base are again seen with adjacent streak artifact limiting evaluation. There are nonspecific scattered white matter hypodensities.   VENTRICLES and EXTRA-AXIAL SPACES: Parenchymal atrophy with prominence of the ventricles and cortical sulci.   OTHER FINDINGS: None.       No acute intracranial hemorrhage, mass effect or midline shift.   Nonspecific scattered white matter hypodensities favored to represent sequela of small vessel ischemia.       MACRO: None.   Signed by: Evan Finkelstein 10/17/2024 11:16 PM Dictation workstation:   [id number]    XR chest 1 view    Result Date: 10/17/2024  Interpreted By:  Ivana Levy, STUDY: XR CHEST 1 VIEW;  10/17/2024 8:50 pm   INDICATION: Signs/Symptoms:central line placement, right IJ.   COMPARISON: 10/17/2024 at 7:35 p.m.   ACCESSION NUMBER(S): [id number]   ORDERING CLINICIAN: MELBA PARK   FINDINGS: Right IJ central venous catheter tip is at the level of the mid SVC.   CARDIOMEDIASTINAL SILHOUETTE: Cardiomediastinal silhouette is normal in size and configuration.   LUNGS: No pulmonary consolidation, pleural effusion or pneumothorax. Stable chronic parenchymal scarring/fibrotic changes.   ABDOMEN: No remarkable upper abdominal findings.   BONES: No acute osseous abnormality.       Right IJ central venous catheter tip is at the level of the mid SVC.   No acute cardiopulmonary process.   MACRO: None   Signed by: Ivana Levy 10/17/2024 9:23 PM Dictation workstation:    [id number]    XR chest 1 view    Result Date: 10/17/2024  Interpreted By:  Dev Burch, STUDY: XR CHEST 1 VIEW;  10/17/2024 7:46 pm   INDICATION: Signs/Symptoms:AMS.     COMPARISON: 12/18/2023   ACCESSION NUMBER(S): [id number]   ORDERING CLINICIAN: TEREZA REYES   FINDINGS: PA and lateral radiographs of the chest were provided.       CARDIOMEDIASTINAL SILHOUETTE: Cardiomediastinal silhouette is normal in size and configuration.   LUNGS: Bibasilar atelectasis versus infiltrate.   ABDOMEN: No remarkable upper abdominal findings.   BONES: No acute osseous changes. There are sternotomy wires noted.       1.  Bibasilar atelectasis versus infiltrate.       MACRO: None   Signed by: Dev Burch 10/17/2024 7:55 PM Dictation workstation:   [id number]     Assessment/Plan   Septic shock, resolved  Toxic metabolic encephalopathy, resolved  Resolving acute kidney injury  Abnormal abdominal CT-gastroenterology on consult  Pyuria-negative urine culture  Abnormal chest imaging-atelectasis versus infiltrate  MRSA colonization of nares  History of recurrent urinary tract infection-positive cultures for Enterococcus        Continue Zosyn  Supportive care  Monitor temperature and WBC  Monitor renal function  Decolonize per protocol  Long-term plan is antibiotic till 10/25/2024-Augmentin is an oral option, followed by amoxicillin 500 mg p.o. daily long-term for secondary prophylaxis for recurrent urinary tract infection Dr. Amaya discussed with son and daughter-see discharge rec    Total time spent caring for the patient today was 20 minutes. This includes time spent before the visit reviewing the chart, time spent during the visit, and time spent after the visit on documentation.   Luci Obando, APRN-CNP

## 2024-10-23 ENCOUNTER — NURSING HOME VISIT (OUTPATIENT)
Dept: POST ACUTE CARE | Facility: EXTERNAL LOCATION | Age: 79
End: 2024-10-23
Payer: COMMERCIAL

## 2024-10-23 DIAGNOSIS — K63.89 CECUM MASS: ICD-10-CM

## 2024-10-23 DIAGNOSIS — F25.9 SCHIZOAFFECTIVE DISORDER, UNSPECIFIED TYPE: ICD-10-CM

## 2024-10-23 DIAGNOSIS — N39.0 URINARY TRACT INFECTION WITHOUT HEMATURIA, SITE UNSPECIFIED: ICD-10-CM

## 2024-10-23 DIAGNOSIS — A41.9 SEPSIS, DUE TO UNSPECIFIED ORGANISM, UNSPECIFIED WHETHER ACUTE ORGAN DYSFUNCTION PRESENT (MULTI): Primary | ICD-10-CM

## 2024-10-23 LAB
ATRIAL RATE: 82 BPM
P AXIS: 12 DEGREES
P OFFSET: 194 MS
P ONSET: 140 MS
PR INTERVAL: 144 MS
Q ONSET: 212 MS
QRS COUNT: 13 BEATS
QRS DURATION: 132 MS
QT INTERVAL: 492 MS
QTC CALCULATION(BAZETT): 574 MS
QTC FREDERICIA: 546 MS
R AXIS: 224 DEGREES
T AXIS: 156 DEGREES
T OFFSET: 458 MS
VENTRICULAR RATE: 82 BPM

## 2024-10-23 PROCEDURE — 99306 1ST NF CARE HIGH MDM 50: CPT | Performed by: INTERNAL MEDICINE

## 2024-10-23 ASSESSMENT — ENCOUNTER SYMPTOMS
ABDOMINAL PAIN: 0
FEVER: 0
SHORTNESS OF BREATH: 0

## 2024-10-23 NOTE — PROGRESS NOTES
HISTORY AND PHYSICAL    History of present illness:    Mariella Cuello is a 79 y.o. female admitted to SNF after hospitalization for altered mental status, fever, sepsis.  Felt to be a urinary source, I do not see a positive urine culture.  Placed on Zosyn.  Prior cultures positive for Enterococcus.  Placed on Zosyn, de-escalated to Augmentin with a long-term plan for amoxicillin prophylaxis.  She was also found with a cecal mass.  She is seen by gastroenterology.  Given her DNR status and overall context family declined further evaluation with the understanding that this could be cancer.  She had a bowel movement this morning and is getting cleaned up in the shower and it seems to be pretty much at her baseline according to the staff on the unit.  Denies pain.    Past Medical History:   Diagnosis Date    Alzheimer disease (Multi)     Anemia     Atherosclerotic heart disease of native coronary artery without angina pectoris 05/12/2021    Atherosclerosis of coronary artery without angina pectoris, unspecified vessel or lesion type, unspecified whether native or transplanted heart    Bipolar disorder (Multi)     Cardiomyopathy (Multi)     Chronic systolic (congestive) heart failure (Multi) 05/12/2021    Chronic systolic heart failure, ACC/AHA stage C    Depression     Dysphagia     Gastritis     History of COVID-19     Hyperlipidemia     Hypertension     Hypothyroidism     Major depressive disorder, recurrent, unspecified ([facility]) 05/12/2021    Major depression, recurrent    Obstipation     Respiratory failure (Multi)     Schizoaffective disorder (Multi)     Spinal stenosis     Stroke (Multi)     Systolic heart failure (Multi)         Past Surgical History:   Procedure Laterality Date    CATARACT EXTRACTION  02/29/2016    Cataract Surgery    CORONARY ARTERY BYPASS GRAFT      MR HEAD ANGIO WO IV CONTRAST  01/22/2014    MR HEAD ANGIO WO IV CONTRAST [facility]    TONSILLECTOMY  02/29/2016    Tonsillectomy     TOTAL ABDOMINAL HYSTERECTOMY  02/29/2016    Total Abdominal Hysterectomy        No family history on file.    Social History     Socioeconomic History    Marital status:      Spouse name: Not on file    Number of children: Not on file    Years of education: Not on file    Highest education level: Not on file   Occupational History    Not on file   Tobacco Use    Smoking status: Never     Passive exposure: Never    Smokeless tobacco: Not on file   Vaping Use    Vaping status: Unknown   Substance and Sexual Activity    Alcohol use: Not on file    Drug use: Not on file    Sexual activity: Not on file   Other Topics Concern    Not on file   Social History Narrative    Not on file     Social Drivers of Health     Financial Resource Strain: Patient Unable To Answer (10/18/2024)    Overall Financial Resource Strain (CARDIA)     Difficulty of Paying Living Expenses: Patient unable to answer   Food Insecurity: Patient Unable To Answer (10/18/2024)    Hunger Vital Sign     Worried About Running Out of Food in the Last Year: Patient unable to answer     Ran Out of Food in the Last Year: Patient unable to answer   Transportation Needs: Patient Unable To Answer (10/18/2024)    PRAPARE - Transportation     Lack of Transportation (Medical): Patient unable to answer     Lack of Transportation (Non-Medical): Patient unable to answer   Physical Activity: Not on file   Stress: Not on file   Social Connections: Not on file   Intimate Partner Violence: Not At Risk (10/18/2024)    Humiliation, Afraid, Rape, and Kick questionnaire     Fear of Current or Ex-Partner: No     Emotionally Abused: No     Physically Abused: No     Sexually Abused: No   Housing Stability: Patient Unable To Answer (10/18/2024)    Housing Stability Vital Sign     Unable to Pay for Housing in the Last Year: Patient unable to answer     Number of Times Moved in the Last Year: 0     Homeless in the Last Year: Patient unable to answer       Review of Systems    Constitutional:  Negative for fever.   Respiratory:  Negative for shortness of breath.    Cardiovascular:  Negative for chest pain.   Gastrointestinal:  Negative for abdominal pain.   All other systems reviewed and are negative.       Objective   Vital signs reviewed in [facility].    Physical Exam  Vitals reviewed.   Constitutional:       Appearance: Normal appearance.   Cardiovascular:      Rate and Rhythm: Normal rate and regular rhythm.      Heart sounds: No murmur heard.  Pulmonary:      Breath sounds: Normal breath sounds. No wheezing, rhonchi or rales.   Musculoskeletal:      Right lower leg: No edema.      Left lower leg: No edema.          Assessment/Plan   Diagnoses and all orders for this visit:  Sepsis, due to unspecified organism, unspecified whether acute organ dysfunction present (Multi) (Primary)  Urinary tract infection without hematuria, site unspecified  Cecum mass  Schizoaffective disorder, unspecified type    Recurrent urinary tract infection, coccus noted on culture in the past, complete the Augmentin and then amoxicillin prophylaxis.    Cecal mass, further evaluation declined given follow-up health context.    Hypertension stable on metoprolol.    Hypothyroidism, clinically and chemically euthyroid.    Anxiety, schizoaffective/bipolar affective disorder continue the prior medications, Abilify, lamictal, bupropion or which have been effective at target symptoms would not like to make any changes at this time.  Seen by psychiatry.    The essential elements of the hospital History and Physical as well as the Discharge Summary have been confirmed to the best of my ability by means of interviewing the patient plus a review of the hospital records. Please note that this entry was created in a shared electronic medical record.     All available hospital and outpatient records have been reviewed. Will continue other current drug therapies as ordered on the continuation of care documents.  Physical and Occupational Therapy will assess and treat per POC. Analgesia for identified pain symptoms. Continue the various medicines for bowel and bladder symptoms as well as the vitamins and supplements per hospital instructions. Will assess and provide local care to abnormalities of skin integrity. Drug to drug interaction data as noted by the pharmacy has been reviewed. The patient's condition warrants the continuation of these drugs as prescribed by the medical specialists. Discharge planning will be coordinated through the  department. I have reviewed any advanced directive documentation that is contained in the admission packet as directives indicating whether a surrogate decision maker has been identified.

## 2024-10-23 NOTE — LETTER
Patient: Mariella Cuello  : 1945    Encounter Date: 10/23/2024    HISTORY AND PHYSICAL    History of present illness:    Mariella Cuello is a 79 y.o. female admitted to SNF after hospitalization for altered mental status, fever, sepsis.  Felt to be a urinary source, I do not see a positive urine culture.  Placed on Zosyn.  Prior cultures positive for Enterococcus.  Placed on Zosyn, de-escalated to Augmentin with a long-term plan for amoxicillin prophylaxis.  She was also found with a cecal mass.  She is seen by gastroenterology.  Given her DNR status and overall context family declined further evaluation with the understanding that this could be cancer.  She had a bowel movement this morning and is getting cleaned up in the shower and it seems to be pretty much at her baseline according to the staff on the unit.  Denies pain.    Past Medical History:   Diagnosis Date   • Alzheimer disease (Multi)    • Anemia    • Atherosclerotic heart disease of native coronary artery without angina pectoris 2021    Atherosclerosis of coronary artery without angina pectoris, unspecified vessel or lesion type, unspecified whether native or transplanted heart   • Bipolar disorder (Multi)    • Cardiomyopathy (Multi)    • Chronic systolic (congestive) heart failure (Multi) 2021    Chronic systolic heart failure, ACC/AHA stage C   • Depression    • Dysphagia    • Gastritis    • History of COVID-19    • Hyperlipidemia    • Hypertension    • Hypothyroidism    • Major depressive disorder, recurrent, unspecified (CMS-HCC) 2021    Major depression, recurrent   • Obstipation    • Respiratory failure (Multi)    • Schizoaffective disorder (Multi)    • Spinal stenosis    • Stroke (Multi)    • Systolic heart failure (Multi)         Past Surgical History:   Procedure Laterality Date   • CATARACT EXTRACTION  2016    Cataract Surgery   • CORONARY ARTERY BYPASS GRAFT     • MR HEAD ANGIO WO IV CONTRAST  2014    MR HEAD  ANGIO WO IV CONTRAST [facility]   • TONSILLECTOMY  02/29/2016    Tonsillectomy   • TOTAL ABDOMINAL HYSTERECTOMY  02/29/2016    Total Abdominal Hysterectomy        No family history on file.    Social History     Socioeconomic History   • Marital status:      Spouse name: Not on file   • Number of children: Not on file   • Years of education: Not on file   • Highest education level: Not on file   Occupational History   • Not on file   Tobacco Use   • Smoking status: Never     Passive exposure: Never   • Smokeless tobacco: Not on file   Vaping Use   • Vaping status: Unknown   Substance and Sexual Activity   • Alcohol use: Not on file   • Drug use: Not on file   • Sexual activity: Not on file   Other Topics Concern   • Not on file   Social History Narrative   • Not on file     Social Drivers of Health     Financial Resource Strain: Patient Unable To Answer (10/18/2024)    Overall Financial Resource Strain (CARDIA)    • Difficulty of Paying Living Expenses: Patient unable to answer   Food Insecurity: Patient Unable To Answer (10/18/2024)    Hunger Vital Sign    • Worried About Running Out of Food in the Last Year: Patient unable to answer    • Ran Out of Food in the Last Year: Patient unable to answer   Transportation Needs: Patient Unable To Answer (10/18/2024)    PRAPARE - Transportation    • Lack of Transportation (Medical): Patient unable to answer    • Lack of Transportation (Non-Medical): Patient unable to answer   Physical Activity: Not on file   Stress: Not on file   Social Connections: Not on file   Intimate Partner Violence: Not At Risk (10/18/2024)    Humiliation, Afraid, Rape, and Kick questionnaire    • Fear of Current or Ex-Partner: No    • Emotionally Abused: No    • Physically Abused: No    • Sexually Abused: No   Housing Stability: Patient Unable To Answer (10/18/2024)    Housing Stability Vital Sign    • Unable to Pay for Housing in the Last Year: Patient unable to answer    • Number of  Times Moved in the Last Year: 0    • Homeless in the Last Year: Patient unable to answer       Review of Systems   Constitutional:  Negative for fever.   Respiratory:  Negative for shortness of breath.    Cardiovascular:  Negative for chest pain.   Gastrointestinal:  Negative for abdominal pain.   All other systems reviewed and are negative.       Objective  Vital signs reviewed in [facility].    Physical Exam  Vitals reviewed.   Constitutional:       Appearance: Normal appearance.   Cardiovascular:      Rate and Rhythm: Normal rate and regular rhythm.      Heart sounds: No murmur heard.  Pulmonary:      Breath sounds: Normal breath sounds. No wheezing, rhonchi or rales.   Musculoskeletal:      Right lower leg: No edema.      Left lower leg: No edema.          Assessment/Plan  Diagnoses and all orders for this visit:  Sepsis, due to unspecified organism, unspecified whether acute organ dysfunction present (Multi) (Primary)  Urinary tract infection without hematuria, site unspecified  Cecum mass  Schizoaffective disorder, unspecified type    Recurrent urinary tract infection, coccus noted on culture in the past, complete the Augmentin and then amoxicillin prophylaxis.    Cecal mass, further evaluation declined given follow-up health context.    Hypertension stable on metoprolol.    Hypothyroidism, clinically and chemically euthyroid.    Anxiety, schizoaffective/bipolar affective disorder continue the prior medications, Abilify, lamictal, bupropion or which have been effective at target symptoms would not like to make any changes at this time.  Seen by psychiatry.    The essential elements of the hospital History and Physical as well as the Discharge Summary have been confirmed to the best of my ability by means of interviewing the patient plus a review of the hospital records. Please note that this entry was created in a shared electronic medical record.     All available hospital and outpatient records have  been reviewed. Will continue other current drug therapies as ordered on the continuation of care documents. Physical and Occupational Therapy will assess and treat per POC. Analgesia for identified pain symptoms. Continue the various medicines for bowel and bladder symptoms as well as the vitamins and supplements per hospital instructions. Will assess and provide local care to abnormalities of skin integrity. Drug to drug interaction data as noted by the pharmacy has been reviewed. The patient's condition warrants the continuation of these drugs as prescribed by the medical specialists. Discharge planning will be coordinated through the  department. I have reviewed any advanced directive documentation that is contained in the admission packet as directives indicating whether a surrogate decision maker has been identified.       Electronically Signed By: Alirio Augustine MD   10/23/24 10:25 AM

## 2024-10-24 ENCOUNTER — NURSING HOME VISIT (OUTPATIENT)
Dept: POST ACUTE CARE | Facility: EXTERNAL LOCATION | Age: 79
End: 2024-10-24
Payer: COMMERCIAL

## 2024-10-24 VITALS
OXYGEN SATURATION: 97 % | SYSTOLIC BLOOD PRESSURE: 111 MMHG | BODY MASS INDEX: 30.9 KG/M2 | DIASTOLIC BLOOD PRESSURE: 58 MMHG | TEMPERATURE: 97.2 F | RESPIRATION RATE: 18 BRPM | WEIGHT: 180 LBS | HEART RATE: 80 BPM

## 2024-10-24 DIAGNOSIS — E03.9 ACQUIRED HYPOTHYROIDISM: ICD-10-CM

## 2024-10-24 DIAGNOSIS — N39.0 URINARY TRACT INFECTION WITHOUT HEMATURIA, SITE UNSPECIFIED: ICD-10-CM

## 2024-10-24 DIAGNOSIS — I50.9 CHRONIC CONGESTIVE HEART FAILURE, UNSPECIFIED HEART FAILURE TYPE: ICD-10-CM

## 2024-10-24 DIAGNOSIS — F25.9 SCHIZOAFFECTIVE DISORDER, UNSPECIFIED TYPE: ICD-10-CM

## 2024-10-24 DIAGNOSIS — K63.89 CECUM MASS: ICD-10-CM

## 2024-10-24 DIAGNOSIS — A41.9 SEPSIS, DUE TO UNSPECIFIED ORGANISM, UNSPECIFIED WHETHER ACUTE ORGAN DYSFUNCTION PRESENT (MULTI): Primary | ICD-10-CM

## 2024-10-24 DIAGNOSIS — I25.810 ATHEROSCLEROSIS OF CORONARY ARTERY BYPASS GRAFT OF NATIVE HEART WITHOUT ANGINA PECTORIS: ICD-10-CM

## 2024-10-24 PROCEDURE — 99309 SBSQ NF CARE MODERATE MDM 30: CPT | Performed by: PHYSICIAN ASSISTANT

## 2024-10-24 ASSESSMENT — ENCOUNTER SYMPTOMS
DYSURIA: 0
SHORTNESS OF BREATH: 0
CONSTIPATION: 0
FEVER: 0
DIZZINESS: 0
WEAKNESS: 0
DIARRHEA: 0
ABDOMINAL PAIN: 0
NERVOUS/ANXIOUS: 0
FLANK PAIN: 0
HEADACHES: 0
FREQUENCY: 0
VOMITING: 0
WHEEZING: 0
APPETITE CHANGE: 0
NAUSEA: 0
HEMATURIA: 0
CHILLS: 0
TREMORS: 0
COUGH: 0
CONFUSION: 0

## 2024-10-24 NOTE — PROGRESS NOTES
Subjective   [id number] : Mariella Cuello is a 79 y.o. female LTC resident at [facility].   HPI  Pt  with h/o CHF, hypothyroid and bipolar disorder.  She was admitted to the hospital with sepsis secondary to urinary source.  She was treated with zosyn and discharged on course of augmentin.  She will continue on amoxicillin for uti prophylaxis.   Pt was also found to have a cecal mass and was seen by GI.  Family has declined further work up for this mass.  She also noted to have elevated troponins and was seen by cardiology.   She has a h/o CAD s/p cabg and CHF, she declined a cardiac cath.  She was started on metoprolol and will continue on aspirin.   Pt seen while resting in bed.  She is a poor historian due to dementia.  She offers no new complaints or concerns.  She denies any pain.  She appears to be at baseline.   She denies any shortness of breath or cough.   She has no lower leg edema.   She has been eating and drinking well.  Her weight is stable.  No n/v/d/c.   Code status is DNR- CCA.  Review of Systems   Constitutional:  Negative for appetite change, chills and fever.   Respiratory:  Negative for cough, shortness of breath and wheezing.    Cardiovascular:  Negative for chest pain and leg swelling.   Gastrointestinal:  Negative for abdominal pain, constipation, diarrhea, nausea and vomiting.   Genitourinary:  Negative for dysuria, flank pain, frequency and hematuria.   Neurological:  Negative for dizziness, tremors, weakness and headaches.   Psychiatric/Behavioral:  Negative for confusion. The patient is not nervous/anxious.    All other systems reviewed and are negative.      Objective   /58   Pulse 80   Temp 36.2 °C (97.2 °F)   Resp 18   Wt 81.6 kg (180 lb)   SpO2 97%   BMI 30.90 kg/m²    Physical Exam  Constitutional:       General: She is not in acute distress.  Eyes:      Pupils: Pupils are equal, round, and reactive to light.   Cardiovascular:      Rate and Rhythm: Normal rate and  "regular rhythm.      Heart sounds: No murmur heard.  Pulmonary:      Effort: Pulmonary effort is normal.      Breath sounds: No wheezing, rhonchi or rales.   Abdominal:      General: Abdomen is flat. Bowel sounds are normal. There is no distension.      Palpations: Abdomen is soft. There is no mass.      Tenderness: There is no abdominal tenderness.   Musculoskeletal:         General: No swelling. Normal range of motion.   Skin:     General: Skin is warm and dry.      Findings: No rash.   Neurological:      General: No focal deficit present.      Mental Status: She is alert and oriented to person, place, and time. Mental status is at baseline.       No lab exists for component: \"CBC BMP\"  Assessment/Plan   Chronic congestive heart failure (CMS/HCC) I50.9        Continue lasix once weekly.  Stable.   Monitor weights and labs.            Acquired hypothyroidism E03.9       Synthroid 100mcg daily.     last TSH elevated - recheck TSH.          Bipolar affective disorder, currently depressed, moderate (CMS/HCC) F31.32       Continue current meds.  Pt follows with psych.  Now on xanax for anxiety symptoms.            CAD s/p CABG:  continue medical management - metoprolol added.   Continue aspirin and statin    Recurrent UTI:  complete treatment for acute UTI - continue amoxicillin daily for UTI prophylaxis    Sepsis:  complete course of augmentin.      Cecum mass:  due to dementia family has declined further work up for abdominal mass.         Time spent: 30 min in review of chart, labs and orders, consultation with pt and documentation.   "

## 2024-10-24 NOTE — LETTER
Patient: Mariella Cuello  : 1945    Encounter Date: 10/24/2024      Subjective  [id number] : Mariella Cuello is a 79 y.o. female LTC resident at [facility].   HPI  Pt  with h/o CHF, hypothyroid and bipolar disorder.  She was admitted to the hospital with sepsis secondary to urinary source.  She was treated with zosyn and discharged on course of augmentin.  She will continue on amoxicillin for uti prophylaxis.   Pt was also found to have a cecal mass and was seen by GI.  Family has declined further work up for this mass.  She also noted to have elevated troponins and was seen by cardiology.   She has a h/o CAD s/p cabg and CHF, she declined a cardiac cath.  She was started on metoprolol and will continue on aspirin.   Pt seen while resting in bed.  She is a poor historian due to dementia.  She offers no new complaints or concerns.  She denies any pain.  She appears to be at baseline.   She denies any shortness of breath or cough.   She has no lower leg edema.   She has been eating and drinking well.  Her weight is stable.  No n/v/d/c.   Code status is DNR- CCA.  Review of Systems   Constitutional:  Negative for appetite change, chills and fever.   Respiratory:  Negative for cough, shortness of breath and wheezing.    Cardiovascular:  Negative for chest pain and leg swelling.   Gastrointestinal:  Negative for abdominal pain, constipation, diarrhea, nausea and vomiting.   Genitourinary:  Negative for dysuria, flank pain, frequency and hematuria.   Neurological:  Negative for dizziness, tremors, weakness and headaches.   Psychiatric/Behavioral:  Negative for confusion. The patient is not nervous/anxious.    All other systems reviewed and are negative.      Objective  /58   Pulse 80   Temp 36.2 °C (97.2 °F)   Resp 18   Wt 81.6 kg (180 lb)   SpO2 97%   BMI 30.90 kg/m²    Physical Exam  Constitutional:       General: She is not in acute distress.  Eyes:      Pupils: Pupils are equal, round, and reactive  "to light.   Cardiovascular:      Rate and Rhythm: Normal rate and regular rhythm.      Heart sounds: No murmur heard.  Pulmonary:      Effort: Pulmonary effort is normal.      Breath sounds: No wheezing, rhonchi or rales.   Abdominal:      General: Abdomen is flat. Bowel sounds are normal. There is no distension.      Palpations: Abdomen is soft. There is no mass.      Tenderness: There is no abdominal tenderness.   Musculoskeletal:         General: No swelling. Normal range of motion.   Skin:     General: Skin is warm and dry.      Findings: No rash.   Neurological:      General: No focal deficit present.      Mental Status: She is alert and oriented to person, place, and time. Mental status is at baseline.       No lab exists for component: \"CBC BMP\"  Assessment/Plan  Chronic congestive heart failure (CMS/HCC) I50.9        Continue lasix once weekly.  Stable.   Monitor weights and labs.            Acquired hypothyroidism E03.9       Synthroid 100mcg daily.     last TSH elevated - recheck TSH.          Bipolar affective disorder, currently depressed, moderate (CMS/HCC) F31.32       Continue current meds.  Pt follows with psych.  Now on xanax for anxiety symptoms.            CAD s/p CABG:  continue medical management - metoprolol added.   Continue aspirin and statin    Recurrent UTI:  complete treatment for acute UTI - continue amoxicillin daily for UTI prophylaxis    Sepsis:  complete course of augmentin.      Cecum mass:  due to dementia family has declined further work up for abdominal mass.         Time spent: 30 min in review of chart, labs and orders, consultation with pt and documentation.       Electronically Signed By: Ana Paula Paul PA-C   10/24/24 12:07 PM  "

## 2024-10-24 NOTE — DISCHARGE SUMMARY
Discharge Diagnosis  Sepsis with encephalopathy and septic shock, due to unspecified organism (Multi)    Issues Requiring Follow-Up  General Primary care follow-up    Discharge Meds     Medication List      START taking these medications     amoxicillin 500 mg capsule; Commonly known as: Amoxil; Take 1 capsule   (500 mg) by mouth once daily. Do not fill before October 26, 2024.; Start   taking on: October 26, 2024   amoxicillin-pot clavulanate 875-125 mg tablet; Commonly known as:   Augmentin; Take 1 tablet by mouth every 12 hours for 4 days.   metoprolol succinate XL 25 mg 24 hr tablet; Commonly known as:   Toprol-XL; Take 1 tablet (25 mg) by mouth once daily. Do not crush or   chew.   pantoprazole 40 mg EC tablet; Commonly known as: ProtoNix; Take 1 tablet   (40 mg) by mouth once daily in the morning. Take before meals. Do not   crush, chew, or split.     CONTINUE taking these medications     * acetaminophen 650 mg suppository; Commonly known as: Tylenol   * acetaminophen 325 mg tablet; Commonly known as: Tylenol   albuterol sulfate 90 mcg/actuation aero powdr breath act w/sensor   inhaler; Commonly known as: Proair Digihaler   ALPRAZolam 0.5 mg tablet; Commonly known as: Xanax; Take 1 tablet (0.5   mg) by mouth every 8 hours if needed for anxiety.   alum-mag hydroxide-simeth 200-200-20 mg/5 mL oral suspension; Commonly   known as: Mylanta   ARIPiprazole 10 mg tablet; Commonly known as: Abilify   aspirin 81 mg EC tablet   atorvastatin 20 mg tablet; Commonly known as: Lipitor   bisacodyl 10 mg suppository; Commonly known as: Dulcolax   * buPROPion  mg 24 hr tablet; Commonly known as: Wellbutrin XL   * buPROPion  mg 24 hr tablet; Commonly known as: Wellbutrin XL   busPIRone 5 mg tablet; Commonly known as: Buspar   cholecalciferol 5,000 Units tablet; Commonly known as: Vitamin D-3   cranberry 450 mg tablet; Generic drug: cranberry fruit   diclofenac epolamine 1.3 % patch   docusate sodium 100 mg capsule;  Commonly known as: Colace   furosemide 20 mg tablet; Commonly known as: Lasix   hypromellose 2.5 % ophthalmic solution; Commonly known as: Vista Gonio   lamoTRIgine 25 mg tablet; Commonly known as: LaMICtal   levothyroxine 100 mcg tablet; Commonly known as: Synthroid, Levoxyl   magnesium hydroxide 2,400 mg/10 mL suspension suspension; Commonly known   as: Milk of Magnesia   Nuedexta 20-10 mg capsule; Generic drug: dextromethorphan-quinidine   potassium chloride CR 10 mEq ER tablet; Commonly known as: Klor-Con   sennosides 8.6 mg tablet; Commonly known as: Senokot   topiramate 50 mg tablet; Commonly known as: Topamax   traMADol 50 mg tablet; Commonly known as: Ultram   traZODone 50 mg tablet; Commonly known as: Desyrel  * This list has 4 medication(s) that are the same as other medications   prescribed for you. Read the directions carefully, and ask your doctor or   other care provider to review them with you.       Test Results Pending At Discharge  Pending Labs       Order Current Status    Extra Urine Gray Tube Collected (10/17/24 1938)    Urinalysis with Reflex Culture and Microscopic In process            Hospital Course  Patient was admitted to hospital after having altered mental status found to be secondary to possible recurrent urinary tract infection.  Patient with history of recurrent Enterococcus UTIs.  Patient was treated empirically with IV antibiotics and eventually de-escalated to Augmentin.  Infectious disease was consulted and discussion was had with son and daughter regarding prophylactic treatment after treatment on this hospital stay.  Patient will continue amoxicillin 500 mg daily long-term for secondary prophylaxis for recurrent UTI.  There were no positive blood cultures on this admission however the urinalysis was indicative of probable infection.  In any case infectious disease decided to treat with the plan as noted above.  Patient was discharged back to rehab in stable and improved  condition with Augmentin followed by amoxicillin prophylaxis.  Patient had acute renal insufficiency as well that resolved.    Additionally, it should be known that patient has a cecal mass that family, son and daughter as primary decision makers, declined to have evaluated further as patient would not want chemotherapy or surgery.    Pertinent Physical Exam At Time of Discharge  Physical Exam  Generally no acute distress  HEENT PERRLA my  Cardiovascular S 1 S2 regular rate rhythm  Lungs clear to auscultation bilaterally  Abdomen bowel sounds present nontender  Outpatient Follow-Up  No future appointments.    Time spent on discharge was 40 minutes  Moose Olson MD

## 2024-11-01 ENCOUNTER — NURSING HOME VISIT (OUTPATIENT)
Dept: POST ACUTE CARE | Facility: EXTERNAL LOCATION | Age: 79
End: 2024-11-01
Payer: COMMERCIAL

## 2024-11-01 VITALS
TEMPERATURE: 96.7 F | BODY MASS INDEX: 30.9 KG/M2 | WEIGHT: 180 LBS | SYSTOLIC BLOOD PRESSURE: 139 MMHG | HEART RATE: 75 BPM | RESPIRATION RATE: 16 BRPM | OXYGEN SATURATION: 97 % | DIASTOLIC BLOOD PRESSURE: 65 MMHG

## 2024-11-01 DIAGNOSIS — A41.9 SEPSIS, DUE TO UNSPECIFIED ORGANISM, UNSPECIFIED WHETHER ACUTE ORGAN DYSFUNCTION PRESENT (MULTI): Primary | ICD-10-CM

## 2024-11-01 DIAGNOSIS — F25.9 SCHIZOAFFECTIVE DISORDER, UNSPECIFIED TYPE: ICD-10-CM

## 2024-11-01 DIAGNOSIS — N39.0 URINARY TRACT INFECTION WITHOUT HEMATURIA, SITE UNSPECIFIED: ICD-10-CM

## 2024-11-01 DIAGNOSIS — K63.89 CECUM MASS: ICD-10-CM

## 2024-11-01 DIAGNOSIS — I25.810 ATHEROSCLEROSIS OF CORONARY ARTERY BYPASS GRAFT OF NATIVE HEART WITHOUT ANGINA PECTORIS: ICD-10-CM

## 2024-11-01 DIAGNOSIS — I50.9 CHRONIC CONGESTIVE HEART FAILURE, UNSPECIFIED HEART FAILURE TYPE: ICD-10-CM

## 2024-11-01 DIAGNOSIS — E03.9 ACQUIRED HYPOTHYROIDISM: ICD-10-CM

## 2024-11-01 PROCEDURE — 99309 SBSQ NF CARE MODERATE MDM 30: CPT | Performed by: PHYSICIAN ASSISTANT

## 2024-11-01 ASSESSMENT — ENCOUNTER SYMPTOMS
CONFUSION: 0
FREQUENCY: 0
COUGH: 0
DYSURIA: 0
WHEEZING: 0
SHORTNESS OF BREATH: 0
HEMATURIA: 0
APPETITE CHANGE: 0
HEADACHES: 0
TREMORS: 0
DIZZINESS: 0
CONSTIPATION: 0
NERVOUS/ANXIOUS: 0
FEVER: 0
WEAKNESS: 0
CHILLS: 0
DIARRHEA: 0
VOMITING: 0
FLANK PAIN: 0
NAUSEA: 0
ABDOMINAL PAIN: 0

## 2024-12-12 ENCOUNTER — NURSING HOME VISIT (OUTPATIENT)
Dept: POST ACUTE CARE | Facility: EXTERNAL LOCATION | Age: 79
End: 2024-12-12
Payer: COMMERCIAL

## 2024-12-12 DIAGNOSIS — I50.9 CHRONIC CONGESTIVE HEART FAILURE, UNSPECIFIED HEART FAILURE TYPE: Primary | ICD-10-CM

## 2024-12-12 DIAGNOSIS — I25.810 ATHEROSCLEROSIS OF CORONARY ARTERY BYPASS GRAFT OF NATIVE HEART WITHOUT ANGINA PECTORIS: ICD-10-CM

## 2024-12-12 DIAGNOSIS — E03.9 ACQUIRED HYPOTHYROIDISM: ICD-10-CM

## 2024-12-12 PROCEDURE — 99308 SBSQ NF CARE LOW MDM 20: CPT | Performed by: INTERNAL MEDICINE

## 2024-12-12 NOTE — PROGRESS NOTES
PROGRESS NOTE      Mariella Cuello is a 79 y.o. female seen in follow up at [facility].    ASSESSMENT / PLAN:  Cecal mass, further evaluation declined given follow-up health context.  Seems overall stable.  Weight stable 183.    Hypertension stable on metoprolol.    Hypothyroidism, clinically and chemically euthyroid.    Anxiety, schizoaffective/bipolar affective disorder continue the prior medications, Abilify, lamictal, bupropion, nudexta    Subjective   This is a 79 y.o. female who resides at at long term care facility, seen today for routine follow up.  The nursing facility documentation is reviewed including orders.  There are currently no concerns over adverse reactions to the prescribed medications.  I reviewed any pertinent laboratory studies.  I discussed the patient's condition with the staff, including current functional status.     Objective   Vital signs reviewed in [facility].  Physical Exam  Vitals reviewed.   Constitutional:       Appearance: Normal appearance.   Cardiovascular:      Rate and Rhythm: Normal rate and regular rhythm.      Heart sounds: No murmur heard.  Pulmonary:      Breath sounds: Normal breath sounds. No wheezing, rhonchi or rales.   Musculoskeletal:      Right lower leg: No edema.      Left lower leg: No edema.         Medical History as seen below has been reviewed and updated in the chart.  Past Medical History:   Diagnosis Date    Alzheimer disease (Multi)     Anemia     Atherosclerotic heart disease of native coronary artery without angina pectoris 05/12/2021    Atherosclerosis of coronary artery without angina pectoris, unspecified vessel or lesion type, unspecified whether native or transplanted heart    Bipolar disorder (Multi)     Cardiomyopathy (Multi)     Chronic systolic (congestive) heart failure (Multi) 05/12/2021    Chronic systolic heart failure, ACC/AHA stage C    Depression     Dysphagia     Gastritis     History of COVID-19     Hyperlipidemia      Hypertension     Hypothyroidism     Major depressive disorder, recurrent, unspecified (CMS-[facility]) 05/12/2021    Major depression, recurrent    Obstipation     Respiratory failure (Multi)     Schizoaffective disorder (Multi)     Spinal stenosis     Stroke (Multi)     Systolic heart failure (Multi)      Past Surgical History:   Procedure Laterality Date    CATARACT EXTRACTION  02/29/2016    Cataract Surgery    CORONARY ARTERY BYPASS GRAFT      MR HEAD ANGIO WO IV CONTRAST  01/22/2014    MR HEAD ANGIO WO IV CONTRAST [facility]    TONSILLECTOMY  02/29/2016    Tonsillectomy    TOTAL ABDOMINAL HYSTERECTOMY  02/29/2016    Total Abdominal Hysterectomy     No family history on file.  No Known Allergies  Current Outpatient Medications on File Prior to Visit   Medication Sig Dispense Refill    acetaminophen (Tylenol) 325 mg tablet Take 2 tablets (650 mg) by mouth every 6 hours if needed for mild pain (1 - 3) or fever (temp greater than 38.0 C).      acetaminophen (Tylenol) 650 mg suppository Insert 1 suppository (650 mg) into the rectum every 6 hours if needed for mild pain (1 - 3) or fever (temp greater than 38.0 C).      albuterol sulfate (Proair Digihaler) 90 mcg/actuation aero powdr breath act w/sensor inhaler Inhale 2 puffs every 6 hours if needed for wheezing.      ALPRAZolam (Xanax) 0.5 mg tablet Take 1 tablet (0.5 mg) by mouth every 8 hours if needed for anxiety. 90 tablet 5    alum-mag hydroxide-simeth (Mylanta) 200-200-20 mg/5 mL oral suspension Take 30 mL by mouth every 4 hours if needed for indigestion or heartburn.      amoxicillin (Amoxil) 500 mg capsule Take 1 capsule (500 mg) by mouth once daily. Do not fill before October 26, 2024. 30 capsule 2    ARIPiprazole (Abilify) 10 mg tablet once every 24 hours.      aspirin 81 mg EC tablet Take 1 tablet (81 mg) by mouth once daily.      atorvastatin (Lipitor) 20 mg tablet Take 1 tablet (20 mg) by mouth once daily.      bisacodyl (Dulcolax) 10 mg suppository  Insert 1 suppository (10 mg) into the rectum once daily as needed for constipation.      buPROPion XL (Wellbutrin XL) 150 mg 24 hr tablet       buPROPion XL (Wellbutrin XL) 300 mg 24 hr tablet       busPIRone (Buspar) 5 mg tablet Take 1 tablet (5 mg) by mouth 3 times a day.      cholecalciferol (Vitamin D-3) 5,000 Units tablet Take 1 tablet (5,000 Units) by mouth once daily.      cranberry fruit (cranberry) 450 mg tablet Take 1 capsule by mouth once daily.      dextromethorphan-quinidine (Nuedexta) 20-10 mg capsule Take 1 capsule by mouth once daily.      diclofenac epolamine 1.3 % patch Place 1 patch on the skin every 12 hours.      docusate sodium (Colace) 100 mg capsule Take 1 capsule (100 mg) by mouth 2 times a day.      furosemide (Lasix) 20 mg tablet Take 1 tablet (20 mg) by mouth once daily. Every Friday      hypromellose (Vista Gonio) 2.5 % ophthalmic solution Administer 1 drop into both eyes 2 times a day as needed for dry eyes.      lamoTRIgine (LaMICtal) 25 mg tablet Take 1 tablet (25 mg) by mouth once daily. Discontinue 10/27/24      levothyroxine (Synthroid, Levoxyl) 100 mcg tablet Take 88 mcg by mouth once daily.      magnesium hydroxide (Milk of Magnesia) 2,400 mg/10 mL suspension suspension Take 10 mL by mouth once daily as needed for constipation.      metoprolol succinate XL (Toprol-XL) 25 mg 24 hr tablet Take 1 tablet (25 mg) by mouth once daily. Do not crush or chew.      pantoprazole (ProtoNix) 40 mg EC tablet Take 1 tablet (40 mg) by mouth once daily in the morning. Take before meals. Do not crush, chew, or split.      potassium chloride CR 10 mEq ER tablet Take 2 tablets (20 mEq) by mouth once daily. Do not crush, chew, or split.      sennosides (Senokot) 8.6 mg tablet Take 2 tablets (17.2 mg) by mouth once daily.      topiramate (Topamax) 50 mg tablet Take 1 tablet (50 mg) by mouth 2 times a day.      traMADol (Ultram) 50 mg tablet Take 1 tablet (50 mg) by mouth every 12 hours if needed for  severe pain (7 - 10).      traZODone (Desyrel) 50 mg tablet Take 0.5 tablets (25 mg) by mouth once daily.       No current facility-administered medications on file prior to visit.     Immunization History   Administered Date(s) Administered    Pfizer Purple Cap SARS-CoV-2 10/29/2021     Alirio Augustine MD

## 2024-12-12 NOTE — LETTER
Patient: Mariella Cuello  : 1945    Encounter Date: 2024    PROGRESS NOTE      Mariella Cuello is a 79 y.o. female seen in follow up at [facility].    ASSESSMENT / PLAN:  Cecal mass, further evaluation declined given follow-up health context.  Seems overall stable.  Weight stable 183.    Hypertension stable on metoprolol.    Hypothyroidism, clinically and chemically euthyroid.    Anxiety, schizoaffective/bipolar affective disorder continue the prior medications, Abilify, lamictal, bupropion, nudexta    Subjective  This is a 79 y.o. female who resides at at long term care facility, seen today for routine follow up.  The nursing facility documentation is reviewed including orders.  There are currently no concerns over adverse reactions to the prescribed medications.  I reviewed any pertinent laboratory studies.  I discussed the patient's condition with the staff, including current functional status.     Objective  Vital signs reviewed in [facility].  Physical Exam  Vitals reviewed.   Constitutional:       Appearance: Normal appearance.   Cardiovascular:      Rate and Rhythm: Normal rate and regular rhythm.      Heart sounds: No murmur heard.  Pulmonary:      Breath sounds: Normal breath sounds. No wheezing, rhonchi or rales.   Musculoskeletal:      Right lower leg: No edema.      Left lower leg: No edema.         Medical History as seen below has been reviewed and updated in the chart.  Past Medical History:   Diagnosis Date   • Alzheimer disease (Multi)    • Anemia    • Atherosclerotic heart disease of native coronary artery without angina pectoris 2021    Atherosclerosis of coronary artery without angina pectoris, unspecified vessel or lesion type, unspecified whether native or transplanted heart   • Bipolar disorder (Multi)    • Cardiomyopathy (Multi)    • Chronic systolic (congestive) heart failure (Multi) 2021    Chronic systolic heart failure, ACC/AHA stage C   • Depression    •  Dysphagia    • Gastritis    • History of COVID-19    • Hyperlipidemia    • Hypertension    • Hypothyroidism    • Major depressive disorder, recurrent, unspecified (CMS-[facility]) 05/12/2021    Major depression, recurrent   • Obstipation    • Respiratory failure (Multi)    • Schizoaffective disorder (Multi)    • Spinal stenosis    • Stroke (Multi)    • Systolic heart failure (Multi)      Past Surgical History:   Procedure Laterality Date   • CATARACT EXTRACTION  02/29/2016    Cataract Surgery   • CORONARY ARTERY BYPASS GRAFT     • MR HEAD ANGIO WO IV CONTRAST  01/22/2014    MR HEAD ANGIO WO IV CONTRAST [facility]   • TONSILLECTOMY  02/29/2016    Tonsillectomy   • TOTAL ABDOMINAL HYSTERECTOMY  02/29/2016    Total Abdominal Hysterectomy     No family history on file.  No Known Allergies  Current Outpatient Medications on File Prior to Visit   Medication Sig Dispense Refill   • acetaminophen (Tylenol) 325 mg tablet Take 2 tablets (650 mg) by mouth every 6 hours if needed for mild pain (1 - 3) or fever (temp greater than 38.0 C).     • acetaminophen (Tylenol) 650 mg suppository Insert 1 suppository (650 mg) into the rectum every 6 hours if needed for mild pain (1 - 3) or fever (temp greater than 38.0 C).     • albuterol sulfate (Proair Digihaler) 90 mcg/actuation aero powdr breath act w/sensor inhaler Inhale 2 puffs every 6 hours if needed for wheezing.     • ALPRAZolam (Xanax) 0.5 mg tablet Take 1 tablet (0.5 mg) by mouth every 8 hours if needed for anxiety. 90 tablet 5   • alum-mag hydroxide-simeth (Mylanta) 200-200-20 mg/5 mL oral suspension Take 30 mL by mouth every 4 hours if needed for indigestion or heartburn.     • amoxicillin (Amoxil) 500 mg capsule Take 1 capsule (500 mg) by mouth once daily. Do not fill before October 26, 2024. 30 capsule 2   • ARIPiprazole (Abilify) 10 mg tablet once every 24 hours.     • aspirin 81 mg EC tablet Take 1 tablet (81 mg) by mouth once daily.     • atorvastatin (Lipitor) 20 mg  tablet Take 1 tablet (20 mg) by mouth once daily.     • bisacodyl (Dulcolax) 10 mg suppository Insert 1 suppository (10 mg) into the rectum once daily as needed for constipation.     • buPROPion XL (Wellbutrin XL) 150 mg 24 hr tablet      • buPROPion XL (Wellbutrin XL) 300 mg 24 hr tablet      • busPIRone (Buspar) 5 mg tablet Take 1 tablet (5 mg) by mouth 3 times a day.     • cholecalciferol (Vitamin D-3) 5,000 Units tablet Take 1 tablet (5,000 Units) by mouth once daily.     • cranberry fruit (cranberry) 450 mg tablet Take 1 capsule by mouth once daily.     • dextromethorphan-quinidine (Nuedexta) 20-10 mg capsule Take 1 capsule by mouth once daily.     • diclofenac epolamine 1.3 % patch Place 1 patch on the skin every 12 hours.     • docusate sodium (Colace) 100 mg capsule Take 1 capsule (100 mg) by mouth 2 times a day.     • furosemide (Lasix) 20 mg tablet Take 1 tablet (20 mg) by mouth once daily. Every Friday     • hypromellose (Vista Gonio) 2.5 % ophthalmic solution Administer 1 drop into both eyes 2 times a day as needed for dry eyes.     • lamoTRIgine (LaMICtal) 25 mg tablet Take 1 tablet (25 mg) by mouth once daily. Discontinue 10/27/24     • levothyroxine (Synthroid, Levoxyl) 100 mcg tablet Take 88 mcg by mouth once daily.     • magnesium hydroxide (Milk of Magnesia) 2,400 mg/10 mL suspension suspension Take 10 mL by mouth once daily as needed for constipation.     • metoprolol succinate XL (Toprol-XL) 25 mg 24 hr tablet Take 1 tablet (25 mg) by mouth once daily. Do not crush or chew.     • pantoprazole (ProtoNix) 40 mg EC tablet Take 1 tablet (40 mg) by mouth once daily in the morning. Take before meals. Do not crush, chew, or split.     • potassium chloride CR 10 mEq ER tablet Take 2 tablets (20 mEq) by mouth once daily. Do not crush, chew, or split.     • sennosides (Senokot) 8.6 mg tablet Take 2 tablets (17.2 mg) by mouth once daily.     • topiramate (Topamax) 50 mg tablet Take 1 tablet (50 mg) by mouth  2 times a day.     • traMADol (Ultram) 50 mg tablet Take 1 tablet (50 mg) by mouth every 12 hours if needed for severe pain (7 - 10).     • traZODone (Desyrel) 50 mg tablet Take 0.5 tablets (25 mg) by mouth once daily.       No current facility-administered medications on file prior to visit.     Immunization History   Administered Date(s) Administered   • Pfizer Purple Cap SARS-CoV-2 10/29/2021     Alirio Augustine MD      Electronically Signed By: Alirio Augustine MD   12/12/24  9:19 AM

## 2025-01-10 PROCEDURE — 87086 URINE CULTURE/COLONY COUNT: CPT | Mod: OUT,TRILAB | Performed by: INTERNAL MEDICINE

## 2025-01-10 PROCEDURE — 81001 URINALYSIS AUTO W/SCOPE: CPT | Mod: OUT | Performed by: INTERNAL MEDICINE

## 2025-01-11 ENCOUNTER — LAB REQUISITION (OUTPATIENT)
Dept: LAB | Facility: HOSPITAL | Age: 80
End: 2025-01-11
Payer: COMMERCIAL

## 2025-01-11 LAB
APPEARANCE UR: ABNORMAL
BACTERIA #/AREA URNS AUTO: ABNORMAL /HPF
BILIRUB UR STRIP.AUTO-MCNC: NEGATIVE MG/DL
COLOR UR: ABNORMAL
GLUCOSE UR STRIP.AUTO-MCNC: NORMAL MG/DL
HOLD SPECIMEN: NORMAL
KETONES UR STRIP.AUTO-MCNC: NEGATIVE MG/DL
LEUKOCYTE ESTERASE UR QL STRIP.AUTO: ABNORMAL
MUCOUS THREADS #/AREA URNS AUTO: ABNORMAL /LPF
NITRITE UR QL STRIP.AUTO: ABNORMAL
PH UR STRIP.AUTO: 6 [PH]
PROT UR STRIP.AUTO-MCNC: NEGATIVE MG/DL
RBC # UR STRIP.AUTO: NEGATIVE /UL
RBC #/AREA URNS AUTO: ABNORMAL /HPF
SP GR UR STRIP.AUTO: 1
UROBILINOGEN UR STRIP.AUTO-MCNC: NORMAL MG/DL
WBC #/AREA URNS AUTO: >50 /HPF

## 2025-01-12 ENCOUNTER — NURSING HOME VISIT (OUTPATIENT)
Dept: POST ACUTE CARE | Facility: EXTERNAL LOCATION | Age: 80
End: 2025-01-12
Payer: COMMERCIAL

## 2025-01-12 DIAGNOSIS — K63.89 CECUM MASS: ICD-10-CM

## 2025-01-12 DIAGNOSIS — I50.9 CHRONIC CONGESTIVE HEART FAILURE, UNSPECIFIED HEART FAILURE TYPE: Primary | ICD-10-CM

## 2025-01-12 DIAGNOSIS — I25.810 ATHEROSCLEROSIS OF CORONARY ARTERY BYPASS GRAFT OF NATIVE HEART WITHOUT ANGINA PECTORIS: ICD-10-CM

## 2025-01-12 DIAGNOSIS — F25.9 SCHIZOAFFECTIVE DISORDER, UNSPECIFIED TYPE: ICD-10-CM

## 2025-01-12 PROCEDURE — 99308 SBSQ NF CARE LOW MDM 20: CPT | Performed by: INTERNAL MEDICINE

## 2025-01-12 NOTE — LETTER
Patient: Mariella Cuello  : 1945    Encounter Date: 2025    PROGRESS NOTE      Mariella Cuello is a 79 y.o. female seen in follow up at [facility].    ASSESSMENT / PLAN:  Cecal mass, further evaluation declined given follow-up health context.  She is losing weight which would be consistent with the likely underlying malignancy.  Continue palliative measures, nursing care.    Hypertension stable on metoprolol.    Hypothyroidism, clinically and chemically euthyroid.    Anxiety, schizoaffective/bipolar affective disorder continue the prior medications, Abilify, lamictal, bupropion, nudexta    Subjective  This is a 79 y.o. female who resides at at long term care facility, no new issues.  Known cecal mass that is not being worked up.  No new or uncontrolled pain or other symptoms per staff.    Objective  Vital signs reviewed in [facility].  Physical Exam  Vitals reviewed.   Constitutional:       Appearance: Normal appearance.   Cardiovascular:      Rate and Rhythm: Normal rate and regular rhythm.      Heart sounds: No murmur heard.  Pulmonary:      Breath sounds: Normal breath sounds. No wheezing, rhonchi or rales.   Musculoskeletal:      Right lower leg: No edema.      Left lower leg: No edema.         Medical History as seen below has been reviewed and updated in the chart.  Past Medical History:   Diagnosis Date   • Alzheimer disease (Multi)    • Anemia    • Atherosclerotic heart disease of native coronary artery without angina pectoris 2021    Atherosclerosis of coronary artery without angina pectoris, unspecified vessel or lesion type, unspecified whether native or transplanted heart   • Bipolar disorder (Multi)    • Cardiomyopathy (Multi)    • Chronic systolic (congestive) heart failure (Multi) 2021    Chronic systolic heart failure, ACC/AHA stage C   • Depression    • Dysphagia    • Gastritis    • History of COVID-19    • Hyperlipidemia    • Hypertension    • Hypothyroidism    •  Major depressive disorder, recurrent, unspecified (CMS-[facility]) 05/12/2021    Major depression, recurrent   • Obstipation    • Respiratory failure (Multi)    • Schizoaffective disorder (Multi)    • Spinal stenosis    • Stroke (Multi)    • Systolic heart failure (Multi)      Past Surgical History:   Procedure Laterality Date   • CATARACT EXTRACTION  02/29/2016    Cataract Surgery   • CORONARY ARTERY BYPASS GRAFT     • MR HEAD ANGIO WO IV CONTRAST  01/22/2014    MR HEAD ANGIO WO IV CONTRAST [facility]   • TONSILLECTOMY  02/29/2016    Tonsillectomy   • TOTAL ABDOMINAL HYSTERECTOMY  02/29/2016    Total Abdominal Hysterectomy     No family history on file.  No Known Allergies  Current Outpatient Medications on File Prior to Visit   Medication Sig Dispense Refill   • acetaminophen (Tylenol) 325 mg tablet Take 2 tablets (650 mg) by mouth every 6 hours if needed for mild pain (1 - 3) or fever (temp greater than 38.0 C).     • acetaminophen (Tylenol) 650 mg suppository Insert 1 suppository (650 mg) into the rectum every 6 hours if needed for mild pain (1 - 3) or fever (temp greater than 38.0 C).     • albuterol sulfate (Proair Digihaler) 90 mcg/actuation aero powdr breath act w/sensor inhaler Inhale 2 puffs every 6 hours if needed for wheezing.     • ALPRAZolam (Xanax) 0.5 mg tablet Take 1 tablet (0.5 mg) by mouth every 8 hours if needed for anxiety. 90 tablet 5   • alum-mag hydroxide-simeth (Mylanta) 200-200-20 mg/5 mL oral suspension Take 30 mL by mouth every 4 hours if needed for indigestion or heartburn.     • amoxicillin (Amoxil) 500 mg capsule Take 1 capsule (500 mg) by mouth once daily. Do not fill before October 26, 2024. 30 capsule 2   • ARIPiprazole (Abilify) 10 mg tablet once every 24 hours.     • aspirin 81 mg EC tablet Take 1 tablet (81 mg) by mouth once daily.     • atorvastatin (Lipitor) 20 mg tablet Take 1 tablet (20 mg) by mouth once daily.     • bisacodyl (Dulcolax) 10 mg suppository Insert 1  suppository (10 mg) into the rectum once daily as needed for constipation.     • buPROPion XL (Wellbutrin XL) 150 mg 24 hr tablet      • buPROPion XL (Wellbutrin XL) 300 mg 24 hr tablet      • busPIRone (Buspar) 5 mg tablet Take 1 tablet (5 mg) by mouth 3 times a day.     • cholecalciferol (Vitamin D-3) 5,000 Units tablet Take 1 tablet (5,000 Units) by mouth once daily.     • cranberry fruit (cranberry) 450 mg tablet Take 1 capsule by mouth once daily.     • dextromethorphan-quinidine (Nuedexta) 20-10 mg capsule Take 1 capsule by mouth once daily.     • diclofenac epolamine 1.3 % patch Place 1 patch on the skin every 12 hours.     • docusate sodium (Colace) 100 mg capsule Take 1 capsule (100 mg) by mouth 2 times a day.     • furosemide (Lasix) 20 mg tablet Take 1 tablet (20 mg) by mouth once daily. Every Friday     • hypromellose (Vista Gonio) 2.5 % ophthalmic solution Administer 1 drop into both eyes 2 times a day as needed for dry eyes.     • lamoTRIgine (LaMICtal) 25 mg tablet Take 1 tablet (25 mg) by mouth once daily. Discontinue 10/27/24     • levothyroxine (Synthroid, Levoxyl) 100 mcg tablet Take 88 mcg by mouth once daily.     • magnesium hydroxide (Milk of Magnesia) 2,400 mg/10 mL suspension suspension Take 10 mL by mouth once daily as needed for constipation.     • metoprolol succinate XL (Toprol-XL) 25 mg 24 hr tablet Take 1 tablet (25 mg) by mouth once daily. Do not crush or chew.     • pantoprazole (ProtoNix) 40 mg EC tablet Take 1 tablet (40 mg) by mouth once daily in the morning. Take before meals. Do not crush, chew, or split.     • potassium chloride CR 10 mEq ER tablet Take 2 tablets (20 mEq) by mouth once daily. Do not crush, chew, or split.     • sennosides (Senokot) 8.6 mg tablet Take 2 tablets (17.2 mg) by mouth once daily.     • topiramate (Topamax) 50 mg tablet Take 1 tablet (50 mg) by mouth 2 times a day.     • traMADol (Ultram) 50 mg tablet Take 1 tablet (50 mg) by mouth every 12 hours if  needed for severe pain (7 - 10).     • traZODone (Desyrel) 50 mg tablet Take 0.5 tablets (25 mg) by mouth once daily.       No current facility-administered medications on file prior to visit.     Immunization History   Administered Date(s) Administered   • Pfizer Purple Cap SARS-CoV-2 10/29/2021     Alirio Augustine MD      Electronically Signed By: Alirio Augustine MD   1/12/25 12:39 PM

## 2025-01-12 NOTE — PROGRESS NOTES
PROGRESS NOTE      Mariella Cuello is a 79 y.o. female seen in follow up at [facility].    ASSESSMENT / PLAN:  Cecal mass, further evaluation declined given follow-up health context.  She is losing weight which would be consistent with the likely underlying malignancy.  Continue palliative measures, nursing care.    Hypertension stable on metoprolol.    Hypothyroidism, clinically and chemically euthyroid.    Anxiety, schizoaffective/bipolar affective disorder continue the prior medications, Abilify, lamictal, bupropion, nudexta    Subjective   This is a 79 y.o. female who resides at at long term care facility, no new issues.  Known cecal mass that is not being worked up.  No new or uncontrolled pain or other symptoms per staff.    Objective   Vital signs reviewed in [facility].  Physical Exam  Vitals reviewed.   Constitutional:       Appearance: Normal appearance.   Cardiovascular:      Rate and Rhythm: Normal rate and regular rhythm.      Heart sounds: No murmur heard.  Pulmonary:      Breath sounds: Normal breath sounds. No wheezing, rhonchi or rales.   Musculoskeletal:      Right lower leg: No edema.      Left lower leg: No edema.         Medical History as seen below has been reviewed and updated in the chart.  Past Medical History:   Diagnosis Date    Alzheimer disease (Multi)     Anemia     Atherosclerotic heart disease of native coronary artery without angina pectoris 05/12/2021    Atherosclerosis of coronary artery without angina pectoris, unspecified vessel or lesion type, unspecified whether native or transplanted heart    Bipolar disorder (Multi)     Cardiomyopathy (Multi)     Chronic systolic (congestive) heart failure (Multi) 05/12/2021    Chronic systolic heart failure, ACC/AHA stage C    Depression     Dysphagia     Gastritis     History of COVID-19     Hyperlipidemia     Hypertension     Hypothyroidism     Major depressive disorder, recurrent, unspecified (CMS-[facility]) 05/12/2021    Major  depression, recurrent    Obstipation     Respiratory failure (Multi)     Schizoaffective disorder (Multi)     Spinal stenosis     Stroke (Multi)     Systolic heart failure (Multi)      Past Surgical History:   Procedure Laterality Date    CATARACT EXTRACTION  02/29/2016    Cataract Surgery    CORONARY ARTERY BYPASS GRAFT      MR HEAD ANGIO WO IV CONTRAST  01/22/2014    MR HEAD ANGIO WO IV CONTRAST [facility]    TONSILLECTOMY  02/29/2016    Tonsillectomy    TOTAL ABDOMINAL HYSTERECTOMY  02/29/2016    Total Abdominal Hysterectomy     No family history on file.  No Known Allergies  Current Outpatient Medications on File Prior to Visit   Medication Sig Dispense Refill    acetaminophen (Tylenol) 325 mg tablet Take 2 tablets (650 mg) by mouth every 6 hours if needed for mild pain (1 - 3) or fever (temp greater than 38.0 C).      acetaminophen (Tylenol) 650 mg suppository Insert 1 suppository (650 mg) into the rectum every 6 hours if needed for mild pain (1 - 3) or fever (temp greater than 38.0 C).      albuterol sulfate (Proair Digihaler) 90 mcg/actuation aero powdr breath act w/sensor inhaler Inhale 2 puffs every 6 hours if needed for wheezing.      ALPRAZolam (Xanax) 0.5 mg tablet Take 1 tablet (0.5 mg) by mouth every 8 hours if needed for anxiety. 90 tablet 5    alum-mag hydroxide-simeth (Mylanta) 200-200-20 mg/5 mL oral suspension Take 30 mL by mouth every 4 hours if needed for indigestion or heartburn.      amoxicillin (Amoxil) 500 mg capsule Take 1 capsule (500 mg) by mouth once daily. Do not fill before October 26, 2024. 30 capsule 2    ARIPiprazole (Abilify) 10 mg tablet once every 24 hours.      aspirin 81 mg EC tablet Take 1 tablet (81 mg) by mouth once daily.      atorvastatin (Lipitor) 20 mg tablet Take 1 tablet (20 mg) by mouth once daily.      bisacodyl (Dulcolax) 10 mg suppository Insert 1 suppository (10 mg) into the rectum once daily as needed for constipation.      buPROPion XL (Wellbutrin XL)  150 mg 24 hr tablet       buPROPion XL (Wellbutrin XL) 300 mg 24 hr tablet       busPIRone (Buspar) 5 mg tablet Take 1 tablet (5 mg) by mouth 3 times a day.      cholecalciferol (Vitamin D-3) 5,000 Units tablet Take 1 tablet (5,000 Units) by mouth once daily.      cranberry fruit (cranberry) 450 mg tablet Take 1 capsule by mouth once daily.      dextromethorphan-quinidine (Nuedexta) 20-10 mg capsule Take 1 capsule by mouth once daily.      diclofenac epolamine 1.3 % patch Place 1 patch on the skin every 12 hours.      docusate sodium (Colace) 100 mg capsule Take 1 capsule (100 mg) by mouth 2 times a day.      furosemide (Lasix) 20 mg tablet Take 1 tablet (20 mg) by mouth once daily. Every Friday      hypromellose (Vista Gonio) 2.5 % ophthalmic solution Administer 1 drop into both eyes 2 times a day as needed for dry eyes.      lamoTRIgine (LaMICtal) 25 mg tablet Take 1 tablet (25 mg) by mouth once daily. Discontinue 10/27/24      levothyroxine (Synthroid, Levoxyl) 100 mcg tablet Take 88 mcg by mouth once daily.      magnesium hydroxide (Milk of Magnesia) 2,400 mg/10 mL suspension suspension Take 10 mL by mouth once daily as needed for constipation.      metoprolol succinate XL (Toprol-XL) 25 mg 24 hr tablet Take 1 tablet (25 mg) by mouth once daily. Do not crush or chew.      pantoprazole (ProtoNix) 40 mg EC tablet Take 1 tablet (40 mg) by mouth once daily in the morning. Take before meals. Do not crush, chew, or split.      potassium chloride CR 10 mEq ER tablet Take 2 tablets (20 mEq) by mouth once daily. Do not crush, chew, or split.      sennosides (Senokot) 8.6 mg tablet Take 2 tablets (17.2 mg) by mouth once daily.      topiramate (Topamax) 50 mg tablet Take 1 tablet (50 mg) by mouth 2 times a day.      traMADol (Ultram) 50 mg tablet Take 1 tablet (50 mg) by mouth every 12 hours if needed for severe pain (7 - 10).      traZODone (Desyrel) 50 mg tablet Take 0.5 tablets (25 mg) by mouth once daily.       No  current facility-administered medications on file prior to visit.     Immunization History   Administered Date(s) Administered    Pfizer Purple Cap SARS-CoV-2 10/29/2021     Alirio Augustine MD

## 2025-01-13 LAB — BACTERIA UR CULT: ABNORMAL

## 2025-01-16 DIAGNOSIS — F31.32 BIPOLAR AFFECTIVE DISORDER, CURRENTLY DEPRESSED, MODERATE (MULTI): ICD-10-CM

## 2025-01-17 RX ORDER — ALPRAZOLAM 0.5 MG/1
0.5 TABLET ORAL EVERY 8 HOURS PRN
Qty: 90 TABLET | Refills: 5 | Status: SHIPPED | OUTPATIENT
Start: 2025-01-17 | End: 2025-07-16

## 2025-02-13 ENCOUNTER — NURSING HOME VISIT (OUTPATIENT)
Dept: POST ACUTE CARE | Facility: EXTERNAL LOCATION | Age: 80
End: 2025-02-13
Payer: COMMERCIAL

## 2025-02-13 DIAGNOSIS — I50.9 CHRONIC CONGESTIVE HEART FAILURE, UNSPECIFIED HEART FAILURE TYPE: Primary | ICD-10-CM

## 2025-02-13 DIAGNOSIS — I25.810 ATHEROSCLEROSIS OF CORONARY ARTERY BYPASS GRAFT OF NATIVE HEART WITHOUT ANGINA PECTORIS: ICD-10-CM

## 2025-02-13 DIAGNOSIS — F25.9 SCHIZOAFFECTIVE DISORDER, UNSPECIFIED TYPE: ICD-10-CM

## 2025-02-13 PROCEDURE — 99308 SBSQ NF CARE LOW MDM 20: CPT | Performed by: INTERNAL MEDICINE

## 2025-02-13 NOTE — LETTER
Patient: Mariella Cuello  : 1945    Encounter Date: 2025    PROGRESS NOTE      Mariella Cuello is a 79 y.o. female seen in follow up at [facility].    ASSESSMENT / PLAN:  Cecal mass, as noted previously, further evaluation declined given follow-up health context.  Weight down about 10 lbs in the last 5 months.  Continue palliative measures, nursing care.    Hypertension stable on metoprolol.    Hypothyroidism, clinically and chemically euthyroid.    Anxiety, schizoaffective/bipolar affective disorder continue the prior medications, Abilify, lamictal, bupropion, nudexta    Subjective  This is a 79 y.o. female who resides at at long term care facility, no new issues.  No issues per patient or staff.  Appetite fair.  Bowels moving.  No abdominal pain appreciated.    Objective  Vital signs reviewed in [facility].  Physical Exam  Vitals reviewed.   Constitutional:       Appearance: Normal appearance.   Cardiovascular:      Rate and Rhythm: Normal rate and regular rhythm.      Heart sounds: No murmur heard.  Pulmonary:      Breath sounds: Normal breath sounds. No wheezing, rhonchi or rales.   Musculoskeletal:      Right lower leg: No edema.      Left lower leg: No edema.         Medical History as seen below has been reviewed and updated in the chart.  Past Medical History:   Diagnosis Date   • Alzheimer disease (Multi)    • Anemia    • Atherosclerotic heart disease of native coronary artery without angina pectoris 2021    Atherosclerosis of coronary artery without angina pectoris, unspecified vessel or lesion type, unspecified whether native or transplanted heart   • Bipolar disorder (Multi)    • Cardiomyopathy (Multi)    • Chronic systolic (congestive) heart failure (Multi) 2021    Chronic systolic heart failure, ACC/AHA stage C   • Depression    • Dysphagia    • Gastritis    • History of COVID-19    • Hyperlipidemia    • Hypertension    • Hypothyroidism    • Major depressive disorder,  recurrent, unspecified (CMS-[facility]) 05/12/2021    Major depression, recurrent   • Obstipation    • Respiratory failure (Multi)    • Schizoaffective disorder (Multi)    • Spinal stenosis    • Stroke (Multi)    • Systolic heart failure (Multi)      Past Surgical History:   Procedure Laterality Date   • CATARACT EXTRACTION  02/29/2016    Cataract Surgery   • CORONARY ARTERY BYPASS GRAFT     • MR HEAD ANGIO WO IV CONTRAST  01/22/2014    MR HEAD ANGIO WO IV CONTRAST [facility]   • TONSILLECTOMY  02/29/2016    Tonsillectomy   • TOTAL ABDOMINAL HYSTERECTOMY  02/29/2016    Total Abdominal Hysterectomy     No family history on file.  No Known Allergies  Current Outpatient Medications on File Prior to Visit   Medication Sig Dispense Refill   • acetaminophen (Tylenol) 325 mg tablet Take 2 tablets (650 mg) by mouth every 6 hours if needed for mild pain (1 - 3) or fever (temp greater than 38.0 C).     • acetaminophen (Tylenol) 650 mg suppository Insert 1 suppository (650 mg) into the rectum every 6 hours if needed for mild pain (1 - 3) or fever (temp greater than 38.0 C).     • albuterol sulfate (Proair Digihaler) 90 mcg/actuation aero powdr breath act w/sensor inhaler Inhale 2 puffs every 6 hours if needed for wheezing.     • ALPRAZolam (Xanax) 0.5 mg tablet Take 1 tablet (0.5 mg) by mouth every 8 hours if needed for anxiety. 90 tablet 5   • alum-mag hydroxide-simeth (Mylanta) 200-200-20 mg/5 mL oral suspension Take 30 mL by mouth every 4 hours if needed for indigestion or heartburn.     • ARIPiprazole (Abilify) 10 mg tablet once every 24 hours.     • aspirin 81 mg EC tablet Take 1 tablet (81 mg) by mouth once daily.     • atorvastatin (Lipitor) 20 mg tablet Take 1 tablet (20 mg) by mouth once daily.     • bisacodyl (Dulcolax) 10 mg suppository Insert 1 suppository (10 mg) into the rectum once daily as needed for constipation.     • buPROPion XL (Wellbutrin XL) 150 mg 24 hr tablet      • buPROPion XL (Wellbutrin XL) 300  mg 24 hr tablet      • busPIRone (Buspar) 5 mg tablet Take 1 tablet (5 mg) by mouth 3 times a day.     • cholecalciferol (Vitamin D-3) 5,000 Units tablet Take 1 tablet (5,000 Units) by mouth once daily.     • cranberry fruit (cranberry) 450 mg tablet Take 1 capsule by mouth once daily.     • dextromethorphan-quinidine (Nuedexta) 20-10 mg capsule Take 1 capsule by mouth once daily.     • diclofenac epolamine 1.3 % patch Place 1 patch on the skin every 12 hours.     • docusate sodium (Colace) 100 mg capsule Take 1 capsule (100 mg) by mouth 2 times a day.     • furosemide (Lasix) 20 mg tablet Take 1 tablet (20 mg) by mouth once daily. Every Friday     • hypromellose (Vista Gonio) 2.5 % ophthalmic solution Administer 1 drop into both eyes 2 times a day as needed for dry eyes.     • lamoTRIgine (LaMICtal) 25 mg tablet Take 1 tablet (25 mg) by mouth once daily. Discontinue 10/27/24     • levothyroxine (Synthroid, Levoxyl) 100 mcg tablet Take 88 mcg by mouth once daily.     • magnesium hydroxide (Milk of Magnesia) 2,400 mg/10 mL suspension suspension Take 10 mL by mouth once daily as needed for constipation.     • metoprolol succinate XL (Toprol-XL) 25 mg 24 hr tablet Take 1 tablet (25 mg) by mouth once daily. Do not crush or chew.     • pantoprazole (ProtoNix) 40 mg EC tablet Take 1 tablet (40 mg) by mouth once daily in the morning. Take before meals. Do not crush, chew, or split.     • potassium chloride CR 10 mEq ER tablet Take 2 tablets (20 mEq) by mouth once daily. Do not crush, chew, or split.     • sennosides (Senokot) 8.6 mg tablet Take 2 tablets (17.2 mg) by mouth once daily.     • topiramate (Topamax) 50 mg tablet Take 1 tablet (50 mg) by mouth 2 times a day.     • traMADol (Ultram) 50 mg tablet Take 1 tablet (50 mg) by mouth every 12 hours if needed for severe pain (7 - 10).     • traZODone (Desyrel) 50 mg tablet Take 0.5 tablets (25 mg) by mouth once daily.       No current facility-administered medications on  file prior to visit.     Immunization History   Administered Date(s) Administered   • Pfizer Purple Cap SARS-CoV-2 10/29/2021     Alirio Augustine MD      Electronically Signed By: Alirio Augustine MD   2/13/25  4:24 PM

## 2025-02-13 NOTE — PROGRESS NOTES
PROGRESS NOTE      Mariella Cuello is a 79 y.o. female seen in follow up at [facility].    ASSESSMENT / PLAN:  Cecal mass, as noted previously, further evaluation declined given follow-up health context.  Weight down about 10 lbs in the last 5 months.  Continue palliative measures, nursing care.    Hypertension stable on metoprolol.    Hypothyroidism, clinically and chemically euthyroid.    Anxiety, schizoaffective/bipolar affective disorder continue the prior medications, Abilify, lamictal, bupropion, nudexta    Subjective   This is a 79 y.o. female who resides at at long term care facility, no new issues.  No issues per patient or staff.  Appetite fair.  Bowels moving.  No abdominal pain appreciated.    Objective   Vital signs reviewed in [facility].  Physical Exam  Vitals reviewed.   Constitutional:       Appearance: Normal appearance.   Cardiovascular:      Rate and Rhythm: Normal rate and regular rhythm.      Heart sounds: No murmur heard.  Pulmonary:      Breath sounds: Normal breath sounds. No wheezing, rhonchi or rales.   Musculoskeletal:      Right lower leg: No edema.      Left lower leg: No edema.         Medical History as seen below has been reviewed and updated in the chart.  Past Medical History:   Diagnosis Date    Alzheimer disease (Multi)     Anemia     Atherosclerotic heart disease of native coronary artery without angina pectoris 05/12/2021    Atherosclerosis of coronary artery without angina pectoris, unspecified vessel or lesion type, unspecified whether native or transplanted heart    Bipolar disorder (Multi)     Cardiomyopathy (Multi)     Chronic systolic (congestive) heart failure (Multi) 05/12/2021    Chronic systolic heart failure, ACC/AHA stage C    Depression     Dysphagia     Gastritis     History of COVID-19     Hyperlipidemia     Hypertension     Hypothyroidism     Major depressive disorder, recurrent, unspecified (CMS-[facility]) 05/12/2021    Major depression, recurrent     Obstipation     Respiratory failure (Multi)     Schizoaffective disorder (Multi)     Spinal stenosis     Stroke (Multi)     Systolic heart failure (Multi)      Past Surgical History:   Procedure Laterality Date    CATARACT EXTRACTION  02/29/2016    Cataract Surgery    CORONARY ARTERY BYPASS GRAFT      MR HEAD ANGIO WO IV CONTRAST  01/22/2014    MR HEAD ANGIO WO IV CONTRAST [facility]    TONSILLECTOMY  02/29/2016    Tonsillectomy    TOTAL ABDOMINAL HYSTERECTOMY  02/29/2016    Total Abdominal Hysterectomy     No family history on file.  No Known Allergies  Current Outpatient Medications on File Prior to Visit   Medication Sig Dispense Refill    acetaminophen (Tylenol) 325 mg tablet Take 2 tablets (650 mg) by mouth every 6 hours if needed for mild pain (1 - 3) or fever (temp greater than 38.0 C).      acetaminophen (Tylenol) 650 mg suppository Insert 1 suppository (650 mg) into the rectum every 6 hours if needed for mild pain (1 - 3) or fever (temp greater than 38.0 C).      albuterol sulfate (Proair Digihaler) 90 mcg/actuation aero powdr breath act w/sensor inhaler Inhale 2 puffs every 6 hours if needed for wheezing.      ALPRAZolam (Xanax) 0.5 mg tablet Take 1 tablet (0.5 mg) by mouth every 8 hours if needed for anxiety. 90 tablet 5    alum-mag hydroxide-simeth (Mylanta) 200-200-20 mg/5 mL oral suspension Take 30 mL by mouth every 4 hours if needed for indigestion or heartburn.      ARIPiprazole (Abilify) 10 mg tablet once every 24 hours.      aspirin 81 mg EC tablet Take 1 tablet (81 mg) by mouth once daily.      atorvastatin (Lipitor) 20 mg tablet Take 1 tablet (20 mg) by mouth once daily.      bisacodyl (Dulcolax) 10 mg suppository Insert 1 suppository (10 mg) into the rectum once daily as needed for constipation.      buPROPion XL (Wellbutrin XL) 150 mg 24 hr tablet       buPROPion XL (Wellbutrin XL) 300 mg 24 hr tablet       busPIRone (Buspar) 5 mg tablet Take 1 tablet (5 mg) by mouth 3 times a day.       cholecalciferol (Vitamin D-3) 5,000 Units tablet Take 1 tablet (5,000 Units) by mouth once daily.      cranberry fruit (cranberry) 450 mg tablet Take 1 capsule by mouth once daily.      dextromethorphan-quinidine (Nuedexta) 20-10 mg capsule Take 1 capsule by mouth once daily.      diclofenac epolamine 1.3 % patch Place 1 patch on the skin every 12 hours.      docusate sodium (Colace) 100 mg capsule Take 1 capsule (100 mg) by mouth 2 times a day.      furosemide (Lasix) 20 mg tablet Take 1 tablet (20 mg) by mouth once daily. Every Friday      hypromellose (Vista Gonio) 2.5 % ophthalmic solution Administer 1 drop into both eyes 2 times a day as needed for dry eyes.      lamoTRIgine (LaMICtal) 25 mg tablet Take 1 tablet (25 mg) by mouth once daily. Discontinue 10/27/24      levothyroxine (Synthroid, Levoxyl) 100 mcg tablet Take 88 mcg by mouth once daily.      magnesium hydroxide (Milk of Magnesia) 2,400 mg/10 mL suspension suspension Take 10 mL by mouth once daily as needed for constipation.      metoprolol succinate XL (Toprol-XL) 25 mg 24 hr tablet Take 1 tablet (25 mg) by mouth once daily. Do not crush or chew.      pantoprazole (ProtoNix) 40 mg EC tablet Take 1 tablet (40 mg) by mouth once daily in the morning. Take before meals. Do not crush, chew, or split.      potassium chloride CR 10 mEq ER tablet Take 2 tablets (20 mEq) by mouth once daily. Do not crush, chew, or split.      sennosides (Senokot) 8.6 mg tablet Take 2 tablets (17.2 mg) by mouth once daily.      topiramate (Topamax) 50 mg tablet Take 1 tablet (50 mg) by mouth 2 times a day.      traMADol (Ultram) 50 mg tablet Take 1 tablet (50 mg) by mouth every 12 hours if needed for severe pain (7 - 10).      traZODone (Desyrel) 50 mg tablet Take 0.5 tablets (25 mg) by mouth once daily.       No current facility-administered medications on file prior to visit.     Immunization History   Administered Date(s) Administered    Pfizer Purple Cap SARS-CoV-2  10/29/2021     Alirio Augustine MD

## 2025-03-29 ENCOUNTER — LAB REQUISITION (OUTPATIENT)
Dept: LAB | Facility: HOSPITAL | Age: 80
End: 2025-03-29
Payer: COMMERCIAL

## 2025-03-29 DIAGNOSIS — N39.9 DISORDER OF URINARY SYSTEM, UNSPECIFIED: ICD-10-CM

## 2025-03-29 LAB
APPEARANCE UR: ABNORMAL
BACTERIA #/AREA URNS AUTO: ABNORMAL /HPF
BILIRUB UR STRIP.AUTO-MCNC: NEGATIVE MG/DL
CAOX CRY #/AREA UR COMP ASSIST: ABNORMAL /HPF
COLOR UR: ABNORMAL
GLUCOSE UR STRIP.AUTO-MCNC: NORMAL MG/DL
KETONES UR STRIP.AUTO-MCNC: NEGATIVE MG/DL
LEUKOCYTE ESTERASE UR QL STRIP.AUTO: ABNORMAL
MUCOUS THREADS #/AREA URNS AUTO: ABNORMAL /LPF
NITRITE UR QL STRIP.AUTO: NEGATIVE
PH UR STRIP.AUTO: 6 [PH]
PROT UR STRIP.AUTO-MCNC: ABNORMAL MG/DL
RBC # UR STRIP.AUTO: ABNORMAL MG/DL
RBC #/AREA URNS AUTO: >20 /HPF
SP GR UR STRIP.AUTO: 1.01
SQUAMOUS #/AREA URNS AUTO: ABNORMAL /HPF
UROBILINOGEN UR STRIP.AUTO-MCNC: NORMAL MG/DL
WBC #/AREA URNS AUTO: >50 /HPF

## 2025-03-29 PROCEDURE — 81001 URINALYSIS AUTO W/SCOPE: CPT | Mod: OUT | Performed by: INTERNAL MEDICINE

## 2025-03-29 PROCEDURE — 87086 URINE CULTURE/COLONY COUNT: CPT | Mod: OUT,TRILAB | Performed by: INTERNAL MEDICINE

## 2025-04-01 LAB — BACTERIA UR CULT: ABNORMAL

## 2025-04-04 ENCOUNTER — NURSING HOME VISIT (OUTPATIENT)
Dept: POST ACUTE CARE | Facility: EXTERNAL LOCATION | Age: 80
End: 2025-04-04
Payer: COMMERCIAL

## 2025-04-04 VITALS
SYSTOLIC BLOOD PRESSURE: 105 MMHG | HEART RATE: 74 BPM | TEMPERATURE: 97.7 F | DIASTOLIC BLOOD PRESSURE: 56 MMHG | RESPIRATION RATE: 18 BRPM | OXYGEN SATURATION: 93 % | WEIGHT: 173.7 LBS | BODY MASS INDEX: 29.82 KG/M2

## 2025-04-04 DIAGNOSIS — I25.810 ATHEROSCLEROSIS OF CORONARY ARTERY BYPASS GRAFT OF NATIVE HEART WITHOUT ANGINA PECTORIS: ICD-10-CM

## 2025-04-04 DIAGNOSIS — F25.9 SCHIZOAFFECTIVE DISORDER, UNSPECIFIED TYPE: ICD-10-CM

## 2025-04-04 DIAGNOSIS — K63.89 CECUM MASS: ICD-10-CM

## 2025-04-04 DIAGNOSIS — N39.0 URINARY TRACT INFECTION WITHOUT HEMATURIA, SITE UNSPECIFIED: Primary | ICD-10-CM

## 2025-04-04 DIAGNOSIS — E03.9 ACQUIRED HYPOTHYROIDISM: ICD-10-CM

## 2025-04-04 DIAGNOSIS — I50.9 CHRONIC CONGESTIVE HEART FAILURE, UNSPECIFIED HEART FAILURE TYPE: ICD-10-CM

## 2025-04-04 PROCEDURE — 99309 SBSQ NF CARE MODERATE MDM 30: CPT | Performed by: PHYSICIAN ASSISTANT

## 2025-04-04 ASSESSMENT — ENCOUNTER SYMPTOMS
DIARRHEA: 0
NAUSEA: 0
HEMATURIA: 0
DYSURIA: 0
CHILLS: 0
DIZZINESS: 0
WHEEZING: 0
ABDOMINAL PAIN: 0
SHORTNESS OF BREATH: 0
CONFUSION: 0
FEVER: 0
HEADACHES: 0
TREMORS: 0
FREQUENCY: 0
NERVOUS/ANXIOUS: 0
CONSTIPATION: 0
WEAKNESS: 0
VOMITING: 0
APPETITE CHANGE: 0
FLANK PAIN: 0
COUGH: 0

## 2025-04-04 NOTE — LETTER
Patient: Mariella Cuello  : 1945    Encounter Date: 2025      Subjective  [id number] : Mariella Cuello is a 79 y.o. female LTC resident at [facility].   HPI  Pt  with h/o CHF, CAD s/p CABG, hypothyroid and bipolar disorder and cecal mass under hospice care.   Pt seen while resting in bed.  Pt has h/o recurrent UTI's and is on prophylactic antibiotics.   UA is positive for UTI and culture has been reviewed.  She is eating and drinking well.  She is a poor historian due to dementia.  She offers no new complaints or concerns.  She denies any pain.  She appears to be at baseline.   She denies any shortness of breath or cough.   She has no lower leg edema.   No n/v/d/c.   Code status is DNR- CCA.  Review of Systems   Constitutional:  Negative for appetite change, chills and fever.   Respiratory:  Negative for cough, shortness of breath and wheezing.    Cardiovascular:  Negative for chest pain and leg swelling.   Gastrointestinal:  Negative for abdominal pain, constipation, diarrhea, nausea and vomiting.   Genitourinary:  Negative for dysuria, flank pain, frequency and hematuria.   Neurological:  Negative for dizziness, tremors, weakness and headaches.   Psychiatric/Behavioral:  Negative for confusion. The patient is not nervous/anxious.    All other systems reviewed and are negative.      Objective  /56   Pulse 74   Temp 36.5 °C (97.7 °F)   Resp 18   Wt 78.8 kg (173 lb 11.2 oz)   SpO2 93%   BMI 29.82 kg/m²    Physical Exam  Constitutional:       General: She is not in acute distress.  Eyes:      Pupils: Pupils are equal, round, and reactive to light.   Cardiovascular:      Rate and Rhythm: Normal rate and regular rhythm.      Heart sounds: No murmur heard.  Pulmonary:      Effort: Pulmonary effort is normal.      Breath sounds: No wheezing, rhonchi or rales.   Abdominal:      General: Abdomen is flat. Bowel sounds are normal. There is no distension.      Palpations: Abdomen is soft. There is no  "mass.      Tenderness: There is no abdominal tenderness.   Musculoskeletal:         General: No swelling. Normal range of motion.   Skin:     General: Skin is warm and dry.      Findings: No rash.   Neurological:      General: No focal deficit present.      Mental Status: She is alert and oriented to person, place, and time. Mental status is at baseline.       No lab exists for component: \"CBC BMP\"  Assessment/Plan  Chronic congestive heart failure (CMS/HCC) I50.9        Continue lasix once weekly.  Stable.   Appears euvolemic.         Acquired hypothyroidism E03.9       Synthroid 100mcg daily.    .          Bipolar affective disorder, currently depressed, moderate (CMS/HCC) F31.32       Continue current meds.  Pt follows with psych.  Now on xanax for anxiety symptoms.         CAD s/p CABG:  continue medical management - metoprolol added.   Continue aspirin and statin ]    Recurrent UTI:  completed treatment for acute UTI - continue amoxicillin daily for UTI prophylax.  UA positive.  Urine culture reviewed - treat with cipro 250mg BID x 5 days.     Cecum mass:  due to dementia family has declined further work up for abdominal mass.         Time spent: 30 min in review of chart, labs and orders, consultation with pt and documentation.       Electronically Signed By: Ana Paula Paul PA-C   4/4/25 10:48 AM  "

## 2025-04-04 NOTE — PROGRESS NOTES
[facility]   [id number] : Mariella Cuello is a 79 y.o. female LTC resident at [facility].   HPI  Pt  with h/o CHF, CAD s/p CABG, hypothyroid and bipolar disorder and cecal mass under hospice care.   Pt seen while resting in bed.  Pt has h/o recurrent UTI's and is on prophylactic antibiotics.   UA is positive for UTI and culture has been reviewed.  She is eating and drinking well.  She is a poor historian due to dementia.  She offers no new complaints or concerns.  She denies any pain.  She appears to be at baseline.   She denies any shortness of breath or cough.   She has no lower leg edema.   No n/v/d/c.   Code status is DNR- CCA.  Review of Systems   Constitutional:  Negative for appetite change, chills and fever.   Respiratory:  Negative for cough, shortness of breath and wheezing.    Cardiovascular:  Negative for chest pain and leg swelling.   Gastrointestinal:  Negative for abdominal pain, constipation, diarrhea, nausea and vomiting.   Genitourinary:  Negative for dysuria, flank pain, frequency and hematuria.   Neurological:  Negative for dizziness, tremors, weakness and headaches.   Psychiatric/Behavioral:  Negative for confusion. The patient is not nervous/anxious.    All other systems reviewed and are negative.      Objective   /56   Pulse 74   Temp 36.5 °C (97.7 °F)   Resp 18   Wt 78.8 kg (173 lb 11.2 oz)   SpO2 93%   BMI 29.82 kg/m²    Physical Exam  Constitutional:       General: She is not in acute distress.  Eyes:      Pupils: Pupils are equal, round, and reactive to light.   Cardiovascular:      Rate and Rhythm: Normal rate and regular rhythm.      Heart sounds: No murmur heard.  Pulmonary:      Effort: Pulmonary effort is normal.      Breath sounds: No wheezing, rhonchi or rales.   Abdominal:      General: Abdomen is flat. Bowel sounds are normal. There is no distension.      Palpations: Abdomen is soft. There is no mass.      Tenderness: There is no abdominal tenderness.  "  Musculoskeletal:         General: No swelling. Normal range of motion.   Skin:     General: Skin is warm and dry.      Findings: No rash.   Neurological:      General: No focal deficit present.      Mental Status: She is alert and oriented to person, place, and time. Mental status is at baseline.       No lab exists for component: \"CBC BMP\"  Assessment/Plan   Chronic congestive heart failure (CMS/HCC) I50.9        Continue lasix once weekly.  Stable.   Appears euvolemic.         Acquired hypothyroidism E03.9       Synthroid 100mcg daily.    .          Bipolar affective disorder, currently depressed, moderate (CMS/HCC) F31.32       Continue current meds.  Pt follows with psych.  Now on xanax for anxiety symptoms.         CAD s/p CABG:  continue medical management - metoprolol added.   Continue aspirin and statin ]    Recurrent UTI:  completed treatment for acute UTI - continue amoxicillin daily for UTI prophylax.  UA positive.  Urine culture reviewed - treat with cipro 250mg BID x 5 days.     Cecum mass:  due to dementia family has declined further work up for abdominal mass.         Time spent: 30 min in review of chart, labs and orders, consultation with pt and documentation.   "

## 2025-04-10 ENCOUNTER — NURSING HOME VISIT (OUTPATIENT)
Dept: POST ACUTE CARE | Facility: EXTERNAL LOCATION | Age: 80
End: 2025-04-10
Payer: COMMERCIAL

## 2025-04-10 DIAGNOSIS — I50.9 CHRONIC CONGESTIVE HEART FAILURE, UNSPECIFIED HEART FAILURE TYPE: Primary | ICD-10-CM

## 2025-04-10 DIAGNOSIS — F25.9 SCHIZOAFFECTIVE DISORDER, UNSPECIFIED TYPE: ICD-10-CM

## 2025-04-10 DIAGNOSIS — I25.810 ATHEROSCLEROSIS OF CORONARY ARTERY BYPASS GRAFT OF NATIVE HEART WITHOUT ANGINA PECTORIS: ICD-10-CM

## 2025-04-10 PROCEDURE — 99308 SBSQ NF CARE LOW MDM 20: CPT | Performed by: INTERNAL MEDICINE

## 2025-04-10 NOTE — LETTER
Patient: Mariella Cuello  : 1945    Encounter Date: 04/10/2025    PROGRESS NOTE      Mariella Cuello is a 79 y.o. female seen in follow up at [facility].    ASSESSMENT / PLAN:  Cecal mass, as noted previously, further evaluation declined given follow-up health context.  Weight has rebounded somewhat, she was down under the 170 jane and has now been in the mid 170s over the past couple months.  Appetite is fair.    Hypertension stable on metoprolol.    Hypothyroidism, clinically and chemically euthyroid.    Anxiety, schizoaffective/bipolar affective disorder continue the prior medications, Abilify, lamictal, bupropion, nudexta    Subjective  This is a 79 y.o. female who resides at at long term care facility, no new issues.  No issues per patient or staff.  Appetite fair.  Bowels moving.  Fairly poor historian due to underlying dementia but no new complaints or concerns.  Spoke with staff clued nurse and aide.    Objective  Vital signs reviewed in [facility].  Physical Exam  Vitals reviewed.   Constitutional:       Appearance: Normal appearance.   Cardiovascular:      Rate and Rhythm: Normal rate and regular rhythm.      Heart sounds: No murmur heard.  Pulmonary:      Breath sounds: Normal breath sounds. No wheezing, rhonchi or rales.   Musculoskeletal:      Right lower leg: No edema.      Left lower leg: No edema.         Medical History as seen below has been reviewed and updated in the chart.  Past Medical History:   Diagnosis Date   • Alzheimer disease (Multi)    • Anemia    • Atherosclerotic heart disease of native coronary artery without angina pectoris 2021    Atherosclerosis of coronary artery without angina pectoris, unspecified vessel or lesion type, unspecified whether native or transplanted heart   • Bipolar disorder (Multi)    • Cardiomyopathy (Multi)    • Chronic systolic (congestive) heart failure (Multi) 2021    Chronic systolic heart failure, ACC/AHA stage C   • Depression     • Dysphagia    • Gastritis    • History of COVID-19    • Hyperlipidemia    • Hypertension    • Hypothyroidism    • Major depressive disorder, recurrent, unspecified (CMS-[facility]) 05/12/2021    Major depression, recurrent   • Obstipation    • Respiratory failure (Multi)    • Schizoaffective disorder (Multi)    • Spinal stenosis    • Stroke (Multi)    • Systolic heart failure (Multi)      Past Surgical History:   Procedure Laterality Date   • CATARACT EXTRACTION  02/29/2016    Cataract Surgery   • CORONARY ARTERY BYPASS GRAFT     • MR HEAD ANGIO WO IV CONTRAST  01/22/2014    MR HEAD ANGIO WO IV CONTRAST [facility]   • TONSILLECTOMY  02/29/2016    Tonsillectomy   • TOTAL ABDOMINAL HYSTERECTOMY  02/29/2016    Total Abdominal Hysterectomy     No family history on file.  No Known Allergies  Current Outpatient Medications on File Prior to Visit   Medication Sig Dispense Refill   • acetaminophen (Tylenol) 325 mg tablet Take 2 tablets (650 mg) by mouth every 6 hours if needed for mild pain (1 - 3) or fever (temp greater than 38.0 C).     • acetaminophen (Tylenol) 650 mg suppository Insert 1 suppository (650 mg) into the rectum every 6 hours if needed for mild pain (1 - 3) or fever (temp greater than 38.0 C).     • albuterol sulfate (Proair Digihaler) 90 mcg/actuation aero powdr breath act w/sensor inhaler Inhale 2 puffs every 6 hours if needed for wheezing.     • ALPRAZolam (Xanax) 0.5 mg tablet Take 1 tablet (0.5 mg) by mouth every 8 hours if needed for anxiety. 90 tablet 5   • alum-mag hydroxide-simeth (Mylanta) 200-200-20 mg/5 mL oral suspension Take 30 mL by mouth every 4 hours if needed for indigestion or heartburn.     • ARIPiprazole (Abilify) 10 mg tablet once every 24 hours.     • aspirin 81 mg EC tablet Take 1 tablet (81 mg) by mouth once daily.     • atorvastatin (Lipitor) 20 mg tablet Take 1 tablet (20 mg) by mouth once daily.     • bisacodyl (Dulcolax) 10 mg suppository Insert 1 suppository (10 mg) into  the rectum once daily as needed for constipation.     • buPROPion XL (Wellbutrin XL) 150 mg 24 hr tablet      • buPROPion XL (Wellbutrin XL) 300 mg 24 hr tablet      • busPIRone (Buspar) 5 mg tablet Take 1 tablet (5 mg) by mouth 3 times a day.     • cholecalciferol (Vitamin D-3) 5,000 Units tablet Take 1 tablet (5,000 Units) by mouth once daily.     • cranberry fruit (cranberry) 450 mg tablet Take 1 capsule by mouth once daily.     • dextromethorphan-quinidine (Nuedexta) 20-10 mg capsule Take 1 capsule by mouth once daily.     • diclofenac epolamine 1.3 % patch Place 1 patch on the skin every 12 hours.     • docusate sodium (Colace) 100 mg capsule Take 1 capsule (100 mg) by mouth 2 times a day.     • furosemide (Lasix) 20 mg tablet Take 1 tablet (20 mg) by mouth once daily. Every Friday     • hypromellose (Vista Gonio) 2.5 % ophthalmic solution Administer 1 drop into both eyes 2 times a day as needed for dry eyes.     • lamoTRIgine (LaMICtal) 25 mg tablet Take 1 tablet (25 mg) by mouth once daily. Discontinue 10/27/24     • levothyroxine (Synthroid, Levoxyl) 100 mcg tablet Take 88 mcg by mouth once daily.     • magnesium hydroxide (Milk of Magnesia) 2,400 mg/10 mL suspension suspension Take 10 mL by mouth once daily as needed for constipation.     • metoprolol succinate XL (Toprol-XL) 25 mg 24 hr tablet Take 1 tablet (25 mg) by mouth once daily. Do not crush or chew.     • pantoprazole (ProtoNix) 40 mg EC tablet Take 1 tablet (40 mg) by mouth once daily in the morning. Take before meals. Do not crush, chew, or split.     • potassium chloride CR 10 mEq ER tablet Take 2 tablets (20 mEq) by mouth once daily. Do not crush, chew, or split.     • sennosides (Senokot) 8.6 mg tablet Take 2 tablets (17.2 mg) by mouth once daily.     • topiramate (Topamax) 50 mg tablet Take 1 tablet (50 mg) by mouth 2 times a day.     • traMADol (Ultram) 50 mg tablet Take 1 tablet (50 mg) by mouth every 12 hours if needed for severe pain (7 -  10).     • traZODone (Desyrel) 50 mg tablet Take 0.5 tablets (25 mg) by mouth once daily.       No current facility-administered medications on file prior to visit.     Immunization History   Administered Date(s) Administered   • Pfizer Purple Cap SARS-CoV-2 10/29/2021     Alirio Augustine MD      Electronically Signed By: Alirio Augustine MD   4/10/25  1:14 PM

## 2025-04-10 NOTE — PROGRESS NOTES
PROGRESS NOTE      Mariella Cuello is a 79 y.o. female seen in follow up at [facility].    ASSESSMENT / PLAN:  Cecal mass, as noted previously, further evaluation declined given follow-up health context.  Weight has rebounded somewhat, she was down under the 170 jane and has now been in the mid 170s over the past couple months.  Appetite is fair.    Hypertension stable on metoprolol.    Hypothyroidism, clinically and chemically euthyroid.    Anxiety, schizoaffective/bipolar affective disorder continue the prior medications, Abilify, lamictal, bupropion, nudexta    Subjective   This is a 79 y.o. female who resides at at long term care facility, no new issues.  No issues per patient or staff.  Appetite fair.  Bowels moving.  Fairly poor historian due to underlying dementia but no new complaints or concerns.  Spoke with staff clued nurse and aide.    Objective   Vital signs reviewed in [facility].  Physical Exam  Vitals reviewed.   Constitutional:       Appearance: Normal appearance.   Cardiovascular:      Rate and Rhythm: Normal rate and regular rhythm.      Heart sounds: No murmur heard.  Pulmonary:      Breath sounds: Normal breath sounds. No wheezing, rhonchi or rales.   Musculoskeletal:      Right lower leg: No edema.      Left lower leg: No edema.         Medical History as seen below has been reviewed and updated in the chart.  Past Medical History:   Diagnosis Date    Alzheimer disease (Multi)     Anemia     Atherosclerotic heart disease of native coronary artery without angina pectoris 05/12/2021    Atherosclerosis of coronary artery without angina pectoris, unspecified vessel or lesion type, unspecified whether native or transplanted heart    Bipolar disorder (Multi)     Cardiomyopathy (Multi)     Chronic systolic (congestive) heart failure (Multi) 05/12/2021    Chronic systolic heart failure, ACC/AHA stage C    Depression     Dysphagia     Gastritis     History of COVID-19     Hyperlipidemia      Hypertension     Hypothyroidism     Major depressive disorder, recurrent, unspecified (CMS-[facility]) 05/12/2021    Major depression, recurrent    Obstipation     Respiratory failure (Multi)     Schizoaffective disorder (Multi)     Spinal stenosis     Stroke (Multi)     Systolic heart failure (Multi)      Past Surgical History:   Procedure Laterality Date    CATARACT EXTRACTION  02/29/2016    Cataract Surgery    CORONARY ARTERY BYPASS GRAFT      MR HEAD ANGIO WO IV CONTRAST  01/22/2014    MR HEAD ANGIO WO IV CONTRAST [facility]    TONSILLECTOMY  02/29/2016    Tonsillectomy    TOTAL ABDOMINAL HYSTERECTOMY  02/29/2016    Total Abdominal Hysterectomy     No family history on file.  No Known Allergies  Current Outpatient Medications on File Prior to Visit   Medication Sig Dispense Refill    acetaminophen (Tylenol) 325 mg tablet Take 2 tablets (650 mg) by mouth every 6 hours if needed for mild pain (1 - 3) or fever (temp greater than 38.0 C).      acetaminophen (Tylenol) 650 mg suppository Insert 1 suppository (650 mg) into the rectum every 6 hours if needed for mild pain (1 - 3) or fever (temp greater than 38.0 C).      albuterol sulfate (Proair Digihaler) 90 mcg/actuation aero powdr breath act w/sensor inhaler Inhale 2 puffs every 6 hours if needed for wheezing.      ALPRAZolam (Xanax) 0.5 mg tablet Take 1 tablet (0.5 mg) by mouth every 8 hours if needed for anxiety. 90 tablet 5    alum-mag hydroxide-simeth (Mylanta) 200-200-20 mg/5 mL oral suspension Take 30 mL by mouth every 4 hours if needed for indigestion or heartburn.      ARIPiprazole (Abilify) 10 mg tablet once every 24 hours.      aspirin 81 mg EC tablet Take 1 tablet (81 mg) by mouth once daily.      atorvastatin (Lipitor) 20 mg tablet Take 1 tablet (20 mg) by mouth once daily.      bisacodyl (Dulcolax) 10 mg suppository Insert 1 suppository (10 mg) into the rectum once daily as needed for constipation.      buPROPion XL (Wellbutrin XL) 150 mg 24 hr tablet        buPROPion XL (Wellbutrin XL) 300 mg 24 hr tablet       busPIRone (Buspar) 5 mg tablet Take 1 tablet (5 mg) by mouth 3 times a day.      cholecalciferol (Vitamin D-3) 5,000 Units tablet Take 1 tablet (5,000 Units) by mouth once daily.      cranberry fruit (cranberry) 450 mg tablet Take 1 capsule by mouth once daily.      dextromethorphan-quinidine (Nuedexta) 20-10 mg capsule Take 1 capsule by mouth once daily.      diclofenac epolamine 1.3 % patch Place 1 patch on the skin every 12 hours.      docusate sodium (Colace) 100 mg capsule Take 1 capsule (100 mg) by mouth 2 times a day.      furosemide (Lasix) 20 mg tablet Take 1 tablet (20 mg) by mouth once daily. Every Friday      hypromellose (Vista Gonio) 2.5 % ophthalmic solution Administer 1 drop into both eyes 2 times a day as needed for dry eyes.      lamoTRIgine (LaMICtal) 25 mg tablet Take 1 tablet (25 mg) by mouth once daily. Discontinue 10/27/24      levothyroxine (Synthroid, Levoxyl) 100 mcg tablet Take 88 mcg by mouth once daily.      magnesium hydroxide (Milk of Magnesia) 2,400 mg/10 mL suspension suspension Take 10 mL by mouth once daily as needed for constipation.      metoprolol succinate XL (Toprol-XL) 25 mg 24 hr tablet Take 1 tablet (25 mg) by mouth once daily. Do not crush or chew.      pantoprazole (ProtoNix) 40 mg EC tablet Take 1 tablet (40 mg) by mouth once daily in the morning. Take before meals. Do not crush, chew, or split.      potassium chloride CR 10 mEq ER tablet Take 2 tablets (20 mEq) by mouth once daily. Do not crush, chew, or split.      sennosides (Senokot) 8.6 mg tablet Take 2 tablets (17.2 mg) by mouth once daily.      topiramate (Topamax) 50 mg tablet Take 1 tablet (50 mg) by mouth 2 times a day.      traMADol (Ultram) 50 mg tablet Take 1 tablet (50 mg) by mouth every 12 hours if needed for severe pain (7 - 10).      traZODone (Desyrel) 50 mg tablet Take 0.5 tablets (25 mg) by mouth once daily.       No current  facility-administered medications on file prior to visit.     Immunization History   Administered Date(s) Administered    Pfizer Purple Cap SARS-CoV-2 10/29/2021     Alirio Augustine MD

## 2025-06-12 ENCOUNTER — NURSING HOME VISIT (OUTPATIENT)
Dept: POST ACUTE CARE | Facility: EXTERNAL LOCATION | Age: 80
End: 2025-06-12
Payer: COMMERCIAL

## 2025-06-12 DIAGNOSIS — F25.9 SCHIZOAFFECTIVE DISORDER, UNSPECIFIED TYPE: ICD-10-CM

## 2025-06-12 DIAGNOSIS — I25.810 ATHEROSCLEROSIS OF CORONARY ARTERY BYPASS GRAFT OF NATIVE HEART WITHOUT ANGINA PECTORIS: ICD-10-CM

## 2025-06-12 DIAGNOSIS — I50.9 CHRONIC CONGESTIVE HEART FAILURE, UNSPECIFIED HEART FAILURE TYPE: Primary | ICD-10-CM

## 2025-06-12 NOTE — PROGRESS NOTES
PROGRESS NOTE      Mariella Cuello is a 79 y.o. female seen in follow up at [facility].    ASSESSMENT / PLAN:  Cecal mass, overall is been asymptomatic.    Hypertension stable on metoprolol.    Hypothyroidism, clinically and chemically euthyroid.    Anxiety, schizoaffective/bipolar affective disorder continue the prior medications, Abilify, lamictal, bupropion, nudexta    Subjective   This is a 79 y.o. female who resides at at long term care facility, no new issues.  Discussed with staff, appetite fair, bowels moving.  80st birthday is tomorrow.    Objective   Vital signs reviewed in [facility].  Physical Exam  Vitals reviewed.   Constitutional:       Appearance: Normal appearance.   Cardiovascular:      Rate and Rhythm: Normal rate and regular rhythm.      Heart sounds: No murmur heard.  Pulmonary:      Breath sounds: Normal breath sounds. No wheezing, rhonchi or rales.   Musculoskeletal:      Right lower leg: No edema.      Left lower leg: No edema.         Medical History as seen below has been reviewed and updated in the chart.  Past Medical History:   Diagnosis Date    Alzheimer disease (Multi)     Anemia     Atherosclerotic heart disease of native coronary artery without angina pectoris 05/12/2021    Atherosclerosis of coronary artery without angina pectoris, unspecified vessel or lesion type, unspecified whether native or transplanted heart    Bipolar disorder (Multi)     Cardiomyopathy (Multi)     Chronic systolic (congestive) heart failure (Multi) 05/12/2021    Chronic systolic heart failure, ACC/AHA stage C    Depression     Dysphagia     Gastritis     History of COVID-19     Hyperlipidemia     Hypertension     Hypothyroidism     Major depressive disorder, recurrent, unspecified (CMS-[facility]) 05/12/2021    Major depression, recurrent    Obstipation     Respiratory failure (Multi)     Schizoaffective disorder (Multi)     Spinal stenosis     Stroke (Multi)     Systolic heart failure (Multi)      Past  Surgical History:   Procedure Laterality Date    CATARACT EXTRACTION  02/29/2016    Cataract Surgery    CORONARY ARTERY BYPASS GRAFT      MR HEAD ANGIO WO IV CONTRAST  01/22/2014    MR HEAD ANGIO WO IV CONTRAST [facility]    TONSILLECTOMY  02/29/2016    Tonsillectomy    TOTAL ABDOMINAL HYSTERECTOMY  02/29/2016    Total Abdominal Hysterectomy     No family history on file.  No Known Allergies  Current Outpatient Medications on File Prior to Visit   Medication Sig Dispense Refill    acetaminophen (Tylenol) 325 mg tablet Take 2 tablets (650 mg) by mouth every 6 hours if needed for mild pain (1 - 3) or fever (temp greater than 38.0 C).      acetaminophen (Tylenol) 650 mg suppository Insert 1 suppository (650 mg) into the rectum every 6 hours if needed for mild pain (1 - 3) or fever (temp greater than 38.0 C).      albuterol sulfate (Proair Digihaler) 90 mcg/actuation aero powdr breath act w/sensor inhaler Inhale 2 puffs every 6 hours if needed for wheezing.      ALPRAZolam (Xanax) 0.5 mg tablet Take 1 tablet (0.5 mg) by mouth every 8 hours if needed for anxiety. 90 tablet 5    alum-mag hydroxide-simeth (Mylanta) 200-200-20 mg/5 mL oral suspension Take 30 mL by mouth every 4 hours if needed for indigestion or heartburn.      ARIPiprazole (Abilify) 10 mg tablet once every 24 hours.      aspirin 81 mg EC tablet Take 1 tablet (81 mg) by mouth once daily.      atorvastatin (Lipitor) 20 mg tablet Take 1 tablet (20 mg) by mouth once daily.      bisacodyl (Dulcolax) 10 mg suppository Insert 1 suppository (10 mg) into the rectum once daily as needed for constipation.      buPROPion XL (Wellbutrin XL) 150 mg 24 hr tablet       buPROPion XL (Wellbutrin XL) 300 mg 24 hr tablet       busPIRone (Buspar) 5 mg tablet Take 1 tablet (5 mg) by mouth 3 times a day.      cholecalciferol (Vitamin D-3) 5,000 Units tablet Take 1 tablet (5,000 Units) by mouth once daily.      cranberry fruit (cranberry) 450 mg tablet Take 1 capsule by  mouth once daily.      dextromethorphan-quinidine (Nuedexta) 20-10 mg capsule Take 1 capsule by mouth once daily.      diclofenac epolamine 1.3 % patch Place 1 patch on the skin every 12 hours.      docusate sodium (Colace) 100 mg capsule Take 1 capsule (100 mg) by mouth 2 times a day.      furosemide (Lasix) 20 mg tablet Take 1 tablet (20 mg) by mouth once daily. Every Friday      hypromellose (Vista Gonio) 2.5 % ophthalmic solution Administer 1 drop into both eyes 2 times a day as needed for dry eyes.      lamoTRIgine (LaMICtal) 25 mg tablet Take 1 tablet (25 mg) by mouth once daily. Discontinue 10/27/24      levothyroxine (Synthroid, Levoxyl) 100 mcg tablet Take 88 mcg by mouth once daily.      magnesium hydroxide (Milk of Magnesia) 2,400 mg/10 mL suspension suspension Take 10 mL by mouth once daily as needed for constipation.      metoprolol succinate XL (Toprol-XL) 25 mg 24 hr tablet Take 1 tablet (25 mg) by mouth once daily. Do not crush or chew.      pantoprazole (ProtoNix) 40 mg EC tablet Take 1 tablet (40 mg) by mouth once daily in the morning. Take before meals. Do not crush, chew, or split.      potassium chloride CR 10 mEq ER tablet Take 2 tablets (20 mEq) by mouth once daily. Do not crush, chew, or split.      sennosides (Senokot) 8.6 mg tablet Take 2 tablets (17.2 mg) by mouth once daily.      topiramate (Topamax) 50 mg tablet Take 1 tablet (50 mg) by mouth 2 times a day.      traMADol (Ultram) 50 mg tablet Take 1 tablet (50 mg) by mouth every 12 hours if needed for severe pain (7 - 10).      traZODone (Desyrel) 50 mg tablet Take 0.5 tablets (25 mg) by mouth once daily.       No current facility-administered medications on file prior to visit.     Immunization History   Administered Date(s) Administered    Pfizer Purple Cap SARS-CoV-2 10/29/2021     Alirio Augustine MD

## 2025-06-12 NOTE — LETTER
Patient: Mariella Cuello  : 1945    Encounter Date: 2025    PROGRESS NOTE      Mariella Cuello is a 79 y.o. female seen in follow up at [facility].    ASSESSMENT / PLAN:  Cecal mass, overall is been asymptomatic.    Hypertension stable on metoprolol.    Hypothyroidism, clinically and chemically euthyroid.    Anxiety, schizoaffective/bipolar affective disorder continue the prior medications, Abilify, lamictal, bupropion, nudexta    Subjective  This is a 79 y.o. female who resides at at long term care facility, no new issues.  Discussed with staff, appetite fair, bowels moving.  80st birthday is tomorrow.    Objective  Vital signs reviewed in [facility].  Physical Exam  Vitals reviewed.   Constitutional:       Appearance: Normal appearance.   Cardiovascular:      Rate and Rhythm: Normal rate and regular rhythm.      Heart sounds: No murmur heard.  Pulmonary:      Breath sounds: Normal breath sounds. No wheezing, rhonchi or rales.   Musculoskeletal:      Right lower leg: No edema.      Left lower leg: No edema.         Medical History as seen below has been reviewed and updated in the chart.  Past Medical History:   Diagnosis Date   • Alzheimer disease (Multi)    • Anemia    • Atherosclerotic heart disease of native coronary artery without angina pectoris 2021    Atherosclerosis of coronary artery without angina pectoris, unspecified vessel or lesion type, unspecified whether native or transplanted heart   • Bipolar disorder (Multi)    • Cardiomyopathy (Multi)    • Chronic systolic (congestive) heart failure (Multi) 2021    Chronic systolic heart failure, ACC/AHA stage C   • Depression    • Dysphagia    • Gastritis    • History of COVID-19    • Hyperlipidemia    • Hypertension    • Hypothyroidism    • Major depressive disorder, recurrent, unspecified (CMS-HCC) 2021    Major depression, recurrent   • Obstipation    • Respiratory failure (Multi)    • Schizoaffective disorder (Multi)     • Spinal stenosis    • Stroke (Multi)    • Systolic heart failure (Multi)      Past Surgical History:   Procedure Laterality Date   • CATARACT EXTRACTION  02/29/2016    Cataract Surgery   • CORONARY ARTERY BYPASS GRAFT     • MR HEAD ANGIO WO IV CONTRAST  01/22/2014    MR HEAD ANGIO WO IV CONTRAST [facility]   • TONSILLECTOMY  02/29/2016    Tonsillectomy   • TOTAL ABDOMINAL HYSTERECTOMY  02/29/2016    Total Abdominal Hysterectomy     No family history on file.  No Known Allergies  Current Outpatient Medications on File Prior to Visit   Medication Sig Dispense Refill   • acetaminophen (Tylenol) 325 mg tablet Take 2 tablets (650 mg) by mouth every 6 hours if needed for mild pain (1 - 3) or fever (temp greater than 38.0 C).     • acetaminophen (Tylenol) 650 mg suppository Insert 1 suppository (650 mg) into the rectum every 6 hours if needed for mild pain (1 - 3) or fever (temp greater than 38.0 C).     • albuterol sulfate (Proair Digihaler) 90 mcg/actuation aero powdr breath act w/sensor inhaler Inhale 2 puffs every 6 hours if needed for wheezing.     • ALPRAZolam (Xanax) 0.5 mg tablet Take 1 tablet (0.5 mg) by mouth every 8 hours if needed for anxiety. 90 tablet 5   • alum-mag hydroxide-simeth (Mylanta) 200-200-20 mg/5 mL oral suspension Take 30 mL by mouth every 4 hours if needed for indigestion or heartburn.     • ARIPiprazole (Abilify) 10 mg tablet once every 24 hours.     • aspirin 81 mg EC tablet Take 1 tablet (81 mg) by mouth once daily.     • atorvastatin (Lipitor) 20 mg tablet Take 1 tablet (20 mg) by mouth once daily.     • bisacodyl (Dulcolax) 10 mg suppository Insert 1 suppository (10 mg) into the rectum once daily as needed for constipation.     • buPROPion XL (Wellbutrin XL) 150 mg 24 hr tablet      • buPROPion XL (Wellbutrin XL) 300 mg 24 hr tablet      • busPIRone (Buspar) 5 mg tablet Take 1 tablet (5 mg) by mouth 3 times a day.     • cholecalciferol (Vitamin D-3) 5,000 Units tablet Take 1  tablet (5,000 Units) by mouth once daily.     • cranberry fruit (cranberry) 450 mg tablet Take 1 capsule by mouth once daily.     • dextromethorphan-quinidine (Nuedexta) 20-10 mg capsule Take 1 capsule by mouth once daily.     • diclofenac epolamine 1.3 % patch Place 1 patch on the skin every 12 hours.     • docusate sodium (Colace) 100 mg capsule Take 1 capsule (100 mg) by mouth 2 times a day.     • furosemide (Lasix) 20 mg tablet Take 1 tablet (20 mg) by mouth once daily. Every Friday     • hypromellose (Vista Gonio) 2.5 % ophthalmic solution Administer 1 drop into both eyes 2 times a day as needed for dry eyes.     • lamoTRIgine (LaMICtal) 25 mg tablet Take 1 tablet (25 mg) by mouth once daily. Discontinue 10/27/24     • levothyroxine (Synthroid, Levoxyl) 100 mcg tablet Take 88 mcg by mouth once daily.     • magnesium hydroxide (Milk of Magnesia) 2,400 mg/10 mL suspension suspension Take 10 mL by mouth once daily as needed for constipation.     • metoprolol succinate XL (Toprol-XL) 25 mg 24 hr tablet Take 1 tablet (25 mg) by mouth once daily. Do not crush or chew.     • pantoprazole (ProtoNix) 40 mg EC tablet Take 1 tablet (40 mg) by mouth once daily in the morning. Take before meals. Do not crush, chew, or split.     • potassium chloride CR 10 mEq ER tablet Take 2 tablets (20 mEq) by mouth once daily. Do not crush, chew, or split.     • sennosides (Senokot) 8.6 mg tablet Take 2 tablets (17.2 mg) by mouth once daily.     • topiramate (Topamax) 50 mg tablet Take 1 tablet (50 mg) by mouth 2 times a day.     • traMADol (Ultram) 50 mg tablet Take 1 tablet (50 mg) by mouth every 12 hours if needed for severe pain (7 - 10).     • traZODone (Desyrel) 50 mg tablet Take 0.5 tablets (25 mg) by mouth once daily.       No current facility-administered medications on file prior to visit.     Immunization History   Administered Date(s) Administered   • Pfizer Purple Cap SARS-CoV-2 10/29/2021     Alirio Augustine,  MD      Electronically Signed By: Alirio Augustine MD   6/12/25  9:08 AM

## 2025-06-16 ENCOUNTER — LAB REQUISITION (OUTPATIENT)
Dept: LAB | Facility: HOSPITAL | Age: 80
End: 2025-06-16
Payer: COMMERCIAL

## 2025-06-16 DIAGNOSIS — G40.909 EPILEPSY, UNSPECIFIED, NOT INTRACTABLE, WITHOUT STATUS EPILEPTICUS: ICD-10-CM

## 2025-06-16 LAB
ANION GAP SERPL CALCULATED.3IONS-SCNC: 11 MMOL/L (ref 10–20)
BUN SERPL-MCNC: 12 MG/DL (ref 6–23)
CALCIUM SERPL-MCNC: 9.3 MG/DL (ref 8.6–10.3)
CHLORIDE SERPL-SCNC: 110 MMOL/L (ref 98–107)
CO2 SERPL-SCNC: 28 MMOL/L (ref 21–32)
CREAT SERPL-MCNC: 0.96 MG/DL (ref 0.5–1.05)
EGFRCR SERPLBLD CKD-EPI 2021: 60 ML/MIN/1.73M*2
ERYTHROCYTE [DISTWIDTH] IN BLOOD BY AUTOMATED COUNT: 13 % (ref 11.5–14.5)
GLUCOSE SERPL-MCNC: 92 MG/DL (ref 74–99)
HCT VFR BLD AUTO: 45.3 % (ref 36–46)
HGB BLD-MCNC: 14.5 G/DL (ref 12–16)
MCH RBC QN AUTO: 31.9 PG (ref 26–34)
MCHC RBC AUTO-ENTMCNC: 32 G/DL (ref 32–36)
MCV RBC AUTO: 100 FL (ref 80–100)
NRBC BLD-RTO: 0 /100 WBCS (ref 0–0)
PLATELET # BLD AUTO: 200 X10*3/UL (ref 150–450)
POTASSIUM SERPL-SCNC: 3.9 MMOL/L (ref 3.5–5.3)
RBC # BLD AUTO: 4.54 X10*6/UL (ref 4–5.2)
SODIUM SERPL-SCNC: 145 MMOL/L (ref 136–145)
WBC # BLD AUTO: 7.3 X10*3/UL (ref 4.4–11.3)

## 2025-06-16 PROCEDURE — 85027 COMPLETE CBC AUTOMATED: CPT | Mod: OUT | Performed by: INTERNAL MEDICINE

## 2025-06-16 PROCEDURE — 82374 ASSAY BLOOD CARBON DIOXIDE: CPT | Mod: OUT | Performed by: INTERNAL MEDICINE

## 2025-07-11 ENCOUNTER — LAB REQUISITION (OUTPATIENT)
Dept: LAB | Facility: HOSPITAL | Age: 80
End: 2025-07-11
Payer: COMMERCIAL

## 2025-07-11 DIAGNOSIS — N39.0 URINARY TRACT INFECTION, SITE NOT SPECIFIED: ICD-10-CM

## 2025-07-11 LAB
APPEARANCE UR: ABNORMAL
BACTERIA #/AREA URNS AUTO: ABNORMAL /HPF
BILIRUB UR STRIP.AUTO-MCNC: NEGATIVE MG/DL
COLOR UR: ABNORMAL
GLUCOSE UR STRIP.AUTO-MCNC: NORMAL MG/DL
HOLD SPECIMEN: NORMAL
KETONES UR STRIP.AUTO-MCNC: NEGATIVE MG/DL
LEUKOCYTE ESTERASE UR QL STRIP.AUTO: ABNORMAL
NITRITE UR QL STRIP.AUTO: NEGATIVE
PH UR STRIP.AUTO: 5.5 [PH]
PROT UR STRIP.AUTO-MCNC: NEGATIVE MG/DL
RBC # UR STRIP.AUTO: NEGATIVE MG/DL
RBC #/AREA URNS AUTO: ABNORMAL /HPF
SP GR UR STRIP.AUTO: 1.01
SQUAMOUS #/AREA URNS AUTO: ABNORMAL /HPF
UROBILINOGEN UR STRIP.AUTO-MCNC: NORMAL MG/DL
WBC #/AREA URNS AUTO: >50 /HPF
WBC CLUMPS #/AREA URNS AUTO: ABNORMAL /HPF

## 2025-07-11 PROCEDURE — 81001 URINALYSIS AUTO W/SCOPE: CPT | Mod: OUT | Performed by: INTERNAL MEDICINE

## 2025-07-11 PROCEDURE — 87077 CULTURE AEROBIC IDENTIFY: CPT | Mod: OUT,TRILAB | Performed by: INTERNAL MEDICINE

## 2025-07-14 LAB
BACTERIA UR CULT: ABNORMAL
BACTERIA UR CULT: ABNORMAL

## 2025-08-14 ENCOUNTER — NURSING HOME VISIT (OUTPATIENT)
Dept: POST ACUTE CARE | Facility: EXTERNAL LOCATION | Age: 80
End: 2025-08-14
Payer: COMMERCIAL

## 2025-08-14 DIAGNOSIS — F25.9 SCHIZOAFFECTIVE DISORDER, UNSPECIFIED TYPE: ICD-10-CM

## 2025-08-14 DIAGNOSIS — M19.90 OSTEOARTHRITIS, UNSPECIFIED OSTEOARTHRITIS TYPE, UNSPECIFIED SITE: ICD-10-CM

## 2025-08-14 DIAGNOSIS — I50.9 CHRONIC CONGESTIVE HEART FAILURE, UNSPECIFIED HEART FAILURE TYPE: Primary | ICD-10-CM

## 2025-08-14 DIAGNOSIS — F02.83 DEMENTIA IN OTHER DISEASES CLASSIFIED ELSEWHERE, UNSPECIFIED SEVERITY, WITH MOOD DISTURBANCE (MULTI): ICD-10-CM

## 2025-08-14 DIAGNOSIS — G30.9 ALZHEIMER'S DISEASE, UNSPECIFIED (CODE): ICD-10-CM

## 2025-08-14 DIAGNOSIS — F03.90 SENILE DEMENTIA (MULTI): ICD-10-CM

## 2025-08-14 DIAGNOSIS — G40.909 SEIZURE DISORDER (MULTI): ICD-10-CM
